# Patient Record
Sex: MALE | Race: WHITE | NOT HISPANIC OR LATINO | Employment: OTHER | URBAN - METROPOLITAN AREA
[De-identification: names, ages, dates, MRNs, and addresses within clinical notes are randomized per-mention and may not be internally consistent; named-entity substitution may affect disease eponyms.]

---

## 2019-01-23 ENCOUNTER — LAB VISIT (OUTPATIENT)
Dept: LAB | Facility: HOSPITAL | Age: 69
End: 2019-01-23
Attending: INTERNAL MEDICINE
Payer: MEDICARE

## 2019-01-23 DIAGNOSIS — I63.9 IMPENDING CEREBROVASCULAR ACCIDENT: Primary | ICD-10-CM

## 2019-01-23 LAB
INR PPP: 1.7
PROTHROMBIN TIME: 17.5 SEC

## 2019-01-23 PROCEDURE — 36415 COLL VENOUS BLD VENIPUNCTURE: CPT

## 2019-01-23 PROCEDURE — 85610 PROTHROMBIN TIME: CPT

## 2019-01-28 ENCOUNTER — TELEPHONE (OUTPATIENT)
Dept: FAMILY MEDICINE | Facility: CLINIC | Age: 69
End: 2019-01-28

## 2019-01-28 DIAGNOSIS — Z71.89 ENCOUNTER FOR HOME SAFETY REVIEW FOR INJURY PREVENTION: ICD-10-CM

## 2019-01-28 DIAGNOSIS — F40.9 FEAR FOR PERSONAL SAFETY: Primary | ICD-10-CM

## 2019-01-28 NOTE — TELEPHONE ENCOUNTER
Please contact home health to clarify, patient has never been seen here before    Please notice that there is no primary care provider listed

## 2019-01-28 NOTE — TELEPHONE ENCOUNTER
"----- Message from Sraah Fisher sent at 1/25/2019  4:08 PM CST -----  Contact: Astrid Terrazas with [facility]  - [phone]  Rep asking for orders for a 3 in 1 bedside Commode for transfer safety. Pt's Height is 71" and weight 197 lbs . Please fax to [employer].  " PHYSICAL EXAM:    GENERAL: Patient is awake and alert and in no acute distress.  Non-toxic appearing.  A+Ox4  Cachetic.  HEAD:  Airway patent.  No oropharyngeal edema.  No stridor.  Auricles are normal.    EYES: EOM grossly intact, conjunctiva non-injected and sclera clear  NECK: Supple, No vertebral point tenderness to palpation.  CHEST/LUNG: Lungs clear to auscultation bilaterally; no wheeze, no rhonchi,  no rales.    HEART: Regular rate and rhythm;   ABDOMEN: Soft, non-tender to palpation, +distended.  No rebound/no guarding.  Bowel sounds present x 4.   MSK/EXTREMITIES: No clubbing or cyanosis. Back is nontender, with no vertebral point tenderness to palpation, no CVAT.  Moving all 4 extremities.     NEURO: Neurologically grossly intact.   No obvious deficits.   PSYCH: Psychiatrically flat affect.  No apparent risk to self or others.

## 2019-01-29 ENCOUNTER — LAB VISIT (OUTPATIENT)
Dept: LAB | Facility: HOSPITAL | Age: 69
End: 2019-01-29
Attending: INTERNAL MEDICINE
Payer: MEDICARE

## 2019-01-29 DIAGNOSIS — I63.9 IMPENDING CEREBROVASCULAR ACCIDENT: Primary | ICD-10-CM

## 2019-01-29 LAB
INR PPP: 1.3
PROTHROMBIN TIME: 13.3 SEC

## 2019-01-29 PROCEDURE — 36415 COLL VENOUS BLD VENIPUNCTURE: CPT

## 2019-01-29 PROCEDURE — 85610 PROTHROMBIN TIME: CPT

## 2019-01-31 ENCOUNTER — LAB VISIT (OUTPATIENT)
Dept: LAB | Facility: HOSPITAL | Age: 69
End: 2019-01-31
Attending: OBSTETRICS & GYNECOLOGY
Payer: MEDICARE

## 2019-01-31 DIAGNOSIS — I63.9 IMPENDING CEREBROVASCULAR ACCIDENT: Primary | ICD-10-CM

## 2019-01-31 LAB
INR PPP: 1.8
PROTHROMBIN TIME: 18.4 SEC

## 2019-01-31 PROCEDURE — 36415 COLL VENOUS BLD VENIPUNCTURE: CPT

## 2019-01-31 PROCEDURE — 85610 PROTHROMBIN TIME: CPT

## 2019-01-31 NOTE — PROGRESS NOTES
This note was created by combination of typed  and Dragon dictation.  Transcription errors may be present.  If there are any questions, please contact me.    Assessment & Plan:   Cerebrovascular accident (CVA) due to embolism of left middle cerebral artery; left basal ganglia; hospitalization [facility] 1/2019  Chronic atrial fibrillation  Pure hypercholesterolemia  Chronic systolic heart failure  Chronic anticoagulation  -status post [facility] early [facility] at [facility].  Outside records reviewed.  I am changing his warfarin to Eliquis.  On metoprolol for rate control.  Was started on pantoprazole presumably for possible stomach lining protective affect on anticoagulation, no history of GERD.  He missed his appointment with the [facility].  He is agreeable to see [facility] for Cardiology and I have submitted a referral.  He has an upcoming follow-up with Neurology and Neurosurgery with the [facility] and he can keep those appointments.  -     apixaban (ELIQUIS) 5 mg Tab; Take 1 tablet (5 mg total) by mouth 2 (two) times daily.  Dispense: 30 tablet; Refill: 5  -     metoprolol tartrate (LOPRESSOR) 25 MG tablet; Take 1 tablet (25 mg total) by mouth 2 (two) times daily. Take a 1/2 tab by mouth twice daily.  Dispense: 90 tablet; Refill: 3  -     pantoprazole (PROTONIX) 40 MG tablet; Take 1 tablet (40 mg total) by mouth once daily. 1 tab by mouth every morning before breakfast.  Dispense: 90 tablet; Refill: 3  -     Ambulatory referral to Cardiology    Type 2 diabetes mellitus without complication, without long-term current use of insulin  -new diagnosis.  Foot exam done today.  He was told that he should wait until his residual deficits from his stroke settle down before seeing an eye doctor as he has decreased vision in his right eye and I think that that would be reasonable.  Dietary modification discussed.  Mediterranean diet handout provided.  For now focus on diet modification  -      atorvastatin (LIPITOR) 80 MG tablet; Take 1 tablet (80 mg total) by mouth once daily. Take 1 tab by mouth every night  Dispense: 90 tablet; Refill: 3    He declines flu and pneumovax today.    Medications Discontinued During This Encounter   Medication Reason    warfarin (COUMADIN) 7.5 MG tablet Therapy completed    atorvastatin (LIPITOR) 80 MG tablet Reorder    metoprolol tartrate (LOPRESSOR) 25 MG tablet Reorder    pantoprazole (PROTONIX) 40 MG tablet Reorder       meds sent this encounter:  Medications Ordered This Encounter   Medications    apixaban (ELIQUIS) 5 mg Tab     Sig: Take 1 tablet (5 mg total) by mouth 2 (two) times daily.     Dispense:  30 tablet     Refill:  5    atorvastatin (LIPITOR) 80 MG tablet     Sig: Take 1 tablet (80 mg total) by mouth once daily. Take 1 tab by mouth every night     Dispense:  90 tablet     Refill:  3    metoprolol tartrate (LOPRESSOR) 25 MG tablet     Sig: Take 1 tablet (25 mg total) by mouth 2 (two) times daily. Take a 1/2 tab by mouth twice daily.     Dispense:  90 tablet     Refill:  3    pantoprazole (PROTONIX) 40 MG tablet     Sig: Take 1 tablet (40 mg total) by mouth once daily. 1 tab by mouth every morning before breakfast.     Dispense:  90 tablet     Refill:  3       Follow Up: No Follow-up on file. OV3  Months recall set. At that time discuss colonoscopy, eye exam, CT lung CA screening, repeat labs.    Subjective:     Chief Complaint   Patient presents with    Establish Care    Follow-up     Hospital    Cerebrovascular Accident    Diabetes       KAMLA Galvin is a 68 y.o. male, last appointment with this clinic was Visit date not found.    NP  Presents today accompanied by his wife.  Because of dysarthria most of the history is obtained from the wife.  Had previously not been to see a doctor in decades.  Was hospitalized from January 1st to January 8th for embolic stroke with new diagnosis of atrial fibrillation and systolic heart failure on  echocardiogram.  Was not on medications at the time.  Smoker.  Had been using nicotine patch.  He had tPA done on presentation.  Subsequently had hemorrhagic conversion of infarct of the left basal ganglia.  Echo showed atrial fibrillation felt to be chronic.  Systolic dysfunction.  Right-sided deficits.  Was started on beta-blocker, started on anticoagulation, started on statin.  Was discharged.  Is getting [facility] but has not gotten speech therapy yet.  A1c was high, diagnostic range for diabetes.  He was not aware of this diagnosis and we discussed this today.    He has an upcoming follow-up visit with Neurosurgery as well as with Neurology but that is not until late February and early March.  He and his wife are unaware that he had an appointment to see Cardiology and that was scheduled for January 29th with Dr. Gentile.  So they missed it.    He is getting home PT/INR but they would like to change him over to a novel anticoagulant because of the trouble and anticipated trouble and expense of Coumadin monitoring.    Date of admission: 1/1/2019  Date of discharge: 01/08/2019.    Admission diagnoses:  Acute cerebral infarction.  Essential hypertension.  Atrial fibrillation.  Hyperglycemia.    Discharge Diagnoses:  Acute cerebral infarction with hemorrhagic conversion.  Atrial fibrillation, chronic.  Essential hypertension.  Diabetes mellitus type 2.  Congestive heart failure, systolic, chronic.  Elevated troponin.  Leukocytosis.  Diabetes mellitus type 2: Diagnosis based on hemoglobin A1c of 7.1% from 01/02/2019. No previous diagnosis--but patient has not been evaluated by a physician in many years. Seems likely this has been present for quite some time. Blood glucose adequately controlled at present. Continue diabetic diet. Treat with correction dose insulin aspart as needed. Monitor blood glucose and consider long-term treatment as indicated.    Congestive heart failure, systolic, chronic: Diagnosis  based on echocardiogram from 01/02/2019 revealing estimated ejection fraction of 40-45%. Unclear etiology--may simply be related to hypertensive cardiomyopathy. May need to consider ischemic workup as an outpatient once further recovered from this acute illness. No Ace inhibitor/angiotensin receptor blocker for now given need to maintain blood pressure for cerebral perfusion in the setting of acute cerebral infarction as below. Continue aspirin, atorvastatin, metoprolol tartrate.    Discharge medications:  Aspirin 325 mg by mouth daily.  Atorvastatin 80 mg by mouth daily.  Docusate 100 mg by mouth twice daily.  Heparin subcutaneous 5000 units subcutaneous twice daily.  Metoprolol tartrate 12.5 mg by mouth twice daily.  Nicotine patch 21 mg per 24 hr daily.    Hospital Course:  68-year-old male admitted to the neurosurgery service for acute cerebral infarction found to be secondary to left middle cerebral artery thrombus presumed to be cardioembolic in origin related to atrial fibrillation. Patient treated with tissue plasminogen activator upon presentation. After patient found to have occlusion of the left middle cerebral artery, patient underwent cerebral angiography with mechanical thrombolysis by Neurosurgery (Dr. Kevin Nobles). Patient found to have left basal ganglia hemorrhage on follow-up CT of the head suggesting hemorrhagic conversion of ischemic infarct. Neurosurgery following--agrees no intervention necessary and recommends monitoring for now. Patient currently recovering well. Patient with residual right hemiparesis, dysarthria, expressive aphasia. Therapists have evaluated patient and recommended inpatient rehabilitation unit for further aggressive therapy. Initially held antiplatelet and anticoagulation for at least 5 days (through 1/06/2019) per Neurosurgery recommendations. Repeat imaging on 01/06/2019 revealed stable findings. Neurosurgery has cleared for condition issue of  antiplatelets/anticoagulation. Started aspirin for now and will consider starting warfarin for anticoagulation once further discussed risks versus benefits with patient's wife. Will avoid novel oral anticoagulants for now as there is no adequate antidote if patient was to have worsening bleeding. Can consider novel oral anticoagulants for use of administration in the next several weeks. Continue atorvastatin.       Fasting lipid profile: Total cholesterol 171 mg/dL. HDL 36 mg/dL.  mg/dL. Triglycerides 118 mg/dL.     Hemoglobin A1c: 7.1%.       MRI of the brain without contrast (01/02/2019):  Recent hemorrhagic infarct centered in the left basal ganglia, involving the left putamen, left globus pallidus, left internal capsule, left caudate head, in the medial aspect of the left temporal lobe.  Surrounding edema and associated mass effect with partial effacement of the left lateral ventricle.  Multiple small foci of abnormal T2 and FLAIR signal in the periventricular and subcortical white matter bilaterally.  Mild generalized cerebral atrophy.   Pituitary gland is not enlarged.   Cerebellar tonsils are in their normal location.   Ventricles are not enlarged.   Visualized orbits are intact.   Mild-moderate inflammatory changes in the paranasal sinuses, most prominent in the ethmoid air cells.  Flow voids in the distal internal carotid arteries and in the basilar artery.  IMPRESSION:   Recent hemorrhagic infarct centered in the left basal ganglia, involving the left putamen, left globus pallidus, left internal capsule, left caudate head, and the medial aspect of the left temporal lobe. Associated mass affect with partial effacement of the left lateral ventricle. Findings are similar to the most recent CT head. Consider follow-up CT head to evaluate for interval change.  Evidence of patchy bilateral chronic white matter ischemia.  Mild generalized cerebral atrophy.    CT of the head without contrast  (01/02/2019):  Evolving recent ischemia involving the left basal ganglia and left caudate head now with intervally developed patchy hemorrhagic conversion in the left basal ganglia. Minor localized mass effect resulting in slight compression of the left frontal horn. The right lateral ventricle appears slightly more prominent than prior exam although without overt hydrocephalus. There may be minor, 1-2 mm left to right midline shift which is new since prior.  Background cerebral atrophy and chronic patchy white matter hypoattenuation most likely reflecting chronic white matter small vessel ischemic changes are similar to prior. No evidence for new cortical-based ischemia or large extra-axial collection.  No acute bony abnormality.   Changes of chronic sinusitis as before.  IMPRESSION:   Impression:  1.   Evolving recent left basal ganglia/left caudate head ischemia now with new patchy hemorrhagic conversion in the left basal ganglia. There is some mild localized mass effect with slight new effacement of the left frontal horn of the lateral ventricle and mild increased prominence of the right lateral ventricle although without overt hydrocephalus at this time. New 1-2 mm left to right midline shift.   2.   Background cerebral atrophy and chronic microangiopathy.       Cerebral perfusion study with contrast (01/01/2018):  There is decreased cerebral blood volume and cerebral blood flow with increased mean transit time in the territory of the left middle cerebral artery in the left temporal, frontal, and parietal lobes.  The core of the infarct is predominantly in the frontal lobe with additional involvement at the putamen, insular cortex, and parietal lobe representing 30-40% of the infarct territory. The majority of the infarct territory is consistent with penumbra and involves mostly the temporal lobe.  IMPRESSION:   Left MCA infarct with 30-40% of the infarct territory representing core. This predominantly involves  the frontal lobe.        CT angiogram of the head (01/01/2019):  There is occlusion of the left middle cerebral artery at the bifurcation of the left internal carotid artery. There is opacification of the left A1 segment and anterior cerebral artery. The right middle cerebral and anterior cerebral arteries are patent. The basilar artery bifurcates normally. The posterior cerebral arteries are patent bilaterally.  There is the typical configuration at the aortic arch with the left common carotid artery having a separate origin from the arch. The vertebral arteries arise from their respective subclavian arteries. The right vertebral artery is dominant. The left vertebral artery is very small in caliber. There is severe (75-80%) stenosis in the proximal right internal carotid artery. No significant stenosis is seen in the left internal carotid artery.  No intracranial mass or mass effect is seen. No abnormal enhancement is noted in the brain parenchyma.  No mass or adenopathy is seen in the neck. Mucosal surfaces of the pharynx are symmetric. The airway is patent. No focal consolidation or effusion is seen in the upper lungs.  IMPRESSION:   1. 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  2. Severe stenosis in the proximal right internal carotid artery.    Echocardiogram, transthoracic (01/02/2018):  Left Ventricle:    Normal left ventricular cavity size. Normal left ventricular wall thickness. Mildly decreased left ventricular     systolic function. Left ventricular ejection fraction is estimated at 40-45 %. Rhythm precludes evaluation of     diastolic function.   Right Ventricle:    Mildly increased right ventricular size. Normal right ventricular systolic function. Right ventricular systolic     pressure 31.5 mmHg.     RV basal-4.76cm    s wave-10    TAPSE-18   Right Atrium:    Mildly increased right atrial size.   Left Atrium:    Mildly increased left atrial size. Left atrial volume index 41.4 ml/m².    Mitral Valve:    Structurally normal mitral valve. Mitral annular calcification. Trace mitral valve regurgitation.   Aortic Valve:     Trileaflet aortic valve. No aortic valve regurgitation.   Tricuspid Valve:     Structurally normal tricuspid valve. Mild tricuspid valve regurgitation.   Pulmonic Valve:     Structurally normal pulmonic valve. Trace pulmonary valve regurgitation.   Pericardium:    No pericardial effusion.   Aorta:    Normal size aortic root and proximal ascending aorta.   Additional Findings:    A bubble study was done and appeared to be negative at rest and with valsalva.   CONCLUSIONS     Normal left ventricular cavity size.     Normal left ventricular wall thickness.     Mildly decreased left ventricular systolic function.     Left ventricular ejection fraction is estimated at 40-45 %.     Rhythm precludes evaluation of diastolic function.     Normal right ventricular systolic function.     Right ventricular systolic pressure 31.5 mmHg.     Mildly increased right atrial size.     Mildly increased left atrial size.     Trace mitral valve regurgitation.     Trileaflet aortic valve.     Mild tricuspid valve regurgitation.     Trace pulmonary valve regurgitation.     A bubble study was done and appeared to be negative at rest and with valsalva.     Patient Care Team:  Bethel Harley MD as PCP - General (Internal Medicine)    Patient Active Problem List    Diagnosis Date Noted    Type 2 diabetes mellitus without complication, without long-term current use of insulin 02/01/2019    Chronic atrial fibrillation 02/01/2019    Pure hypercholesterolemia 02/01/2019    Chronic systolic heart failure new dx with CVA 1/2019 on TTE LVEF 40-45 02/01/2019     Echocardiogram, transthoracic (01/02/2018):  Left Ventricle: Normal left ventricular cavity size. Normal left ventricular wall thickness. Mildly decreased left ventricular systolic function. Left ventricular ejection fraction is estimated at 40-45 %.  Rhythm precludes evaluation of diastolic function.   Right Ventricle:  Mildly increased right ventricular size. Normal right ventricular systolic function. Right ventricular systolic pressure 31.5 mmHg. RV basal-4.76cm    s wave-10    TAPSE-18   Right Atrium: Mildly increased right atrial size.   Left Atrium: Mildly increased left atrial size. Left atrial volume index 41.4 ml/m².   Mitral Valve: Structurally normal mitral valve. Mitral annular calcification. Trace mitral valve regurgitation.   Aortic Valve: Trileaflet aortic valve. No aortic valve regurgitation.   Tricuspid Valve: Structurally normal tricuspid valve. Mild tricuspid valve regurgitation.   Pulmonic Valve: Structurally normal pulmonic valve. Trace pulmonary valve regurgitation.   Pericardium: No pericardial effusion.   Aorta: Normal size aortic root and proximal ascending aorta.   Additional Findings: A bubble study was done and appeared to be negative at rest and with valsalva.   CONCLUSIONS     Normal left ventricular cavity size.     Normal left ventricular wall thickness.     Mildly decreased left ventricular systolic function.     Left ventricular ejection fraction is estimated at 40-45 %.     Rhythm precludes evaluation of diastolic function.     Normal right ventricular systolic function.     Right ventricular systolic pressure 31.5 mmHg.     Mildly increased right atrial size.     Mildly increased left atrial size.     Trace mitral valve regurgitation.     Trileaflet aortic valve.     Mild tricuspid valve regurgitation.     Trace pulmonary valve regurgitation.     A bubble study was done and appeared to be negative at rest and with valsalva.       Cerebrovascular accident (CVA) due to embolism of left middle cerebral artery; left basal ganglia; hospitalization [facility] 1/2019 01/02/2019     Cerebral perfusion study with contrast (01/01/2019):  IMPRESSION:   Left MCA infarct with 30-40% of the infarct territory representing core. This predominantly  involves the frontal lobe.      CT angiogram of the head (01/01/2019):  IMPRESSION:   1. 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  2. Severe stenosis in the proximal right internal carotid artery.    MRI of the brain without contrast (01/02/2019):   IMPRESSION:   Recent hemorrhagic infarct centered in the left basal ganglia, involving the left putamen, left globus pallidus, left internal capsule, left caudate head, and the medial aspect of the left temporal lobe. Associated mass affect with partial effacement of the left lateral ventricle. Findings are similar to the most recent CT head. Consider follow-up CT head to evaluate for interval change.  Evidence of patchy bilateral chronic white matter ischemia.  Mild generalized cerebral atrophy.    CT of the head without contrast (01/02/2019):  IMPRESSION:   Impression:  1.   Evolving recent left basal ganglia/left caudate head ischemia now with new patchy hemorrhagic conversion in the left basal ganglia. There is some mild localized mass effect with slight new effacement of the left frontal horn of the lateral ventricle and mild increased prominence of the right lateral ventricle although without overt hydrocephalus at this time. New 1-2 mm left to right midline shift.   2.   Background cerebral atrophy and chronic microangiopathy.         PAST MEDICAL HISTORY:  No past medical history on file.    PAST SURGICAL HISTORY:  No past surgical history on file.    Family History   Problem Relation Age of Onset    Arthritis Mother     Arthritis Father     Heart disease Father     No Known Problems Sister     COPD Brother     No Known Problems Sister     Peripheral vascular disease Sister     Alcohol abuse Brother        SOCIAL HISTORY:  Social History     Socioeconomic History    Marital status:      Spouse name: Cely Doshi    Number of children: Not on file    Years of education: Not on file    Highest education level: Not on file  "  Social Needs    Financial resource strain: Not on file    Food insecurity - worry: Not on file    Food insecurity - inability: Not on file    Transportation needs - medical: Not on file    Transportation needs - non-medical: Not on file   Occupational History    Occupation: retired - ship builder -[employer]   Tobacco Use    Smoking status: Former Smoker     Packs/day: 1.00     Years: 50.00     Pack years: 50.00     Types: Cigarettes     Start date:      Last attempt to quit: 2019     Years since quittin.0    Smokeless tobacco: Never Used   Substance and Sexual Activity    Alcohol use: No     Frequency: Never    Drug use: No    Sexual activity: No   Other Topics Concern    Not on file   Social History Narrative    Not on file       ALLERGIES AND MEDICATIONS: updated and reviewed.  Review of patient's allergies indicates:  Allergies not on file  No current outpatient medications on file.     No current facility-administered medications for this visit.        Review of Systems   Constitutional: Negative for chills and fever.   Respiratory: Negative for shortness of breath.    Cardiovascular: Negative for chest pain.   Neurological: Positive for focal weakness. Negative for headaches.       Objective:   Physical Exam   Vitals:    19 1003   BP: 120/70   Pulse: 86   Temp: 98.3 °F (36.8 °C)   TempSrc: Oral   SpO2: 97%   Weight: 93.9 kg (207 lb 0.2 oz)   Height: 5' 9" (1.753 m)    Body mass index is 30.57 kg/m².            Physical Exam   Constitutional: He is oriented to person, place, and time. He appears well-developed and well-nourished. No distress.   Eyes: EOM are normal.   Cardiovascular: Normal rate and normal heart sounds.   No murmur heard.  Pulses:       Dorsalis pedis pulses are 3+ on the right side, and 3+ on the left side.        Posterior tibial pulses are 3+ on the right side, and 3+ on the left side.   Irregularly irregular   Pulmonary/Chest: Effort normal and breath " sounds normal.   Musculoskeletal: He exhibits edema.        Right foot: There is no deformity.        Left foot: There is no deformity.   Mild pitting edema of the ankles bilaterally   Feet:   Right Foot:   Protective Sensation: 5 sites tested. 5 sites sensed.   Skin Integrity: Negative for ulcer, blister, skin breakdown, erythema or warmth.   Left Foot:   Protective Sensation: 5 sites tested. 5 sites sensed.   Skin Integrity: Negative for ulcer, blister, skin breakdown, erythema or warmth.   Neurological: He is alert and oriented to person, place, and time. He displays abnormal reflex. A cranial nerve deficit is present. He exhibits abnormal muscle tone.   Broad-based gait which he and wife note is new since the stroke.  Pronator drift on the right.   strength 4/5 on the right.  Hyperreflexia of the right patella and Achilles DTR  Right-sided facial droop   Skin: Skin is warm and dry.   Psychiatric: He has a normal mood and affect. His behavior is normal. Thought content normal.

## 2019-02-01 ENCOUNTER — OFFICE VISIT (OUTPATIENT)
Dept: FAMILY MEDICINE | Facility: CLINIC | Age: 69
End: 2019-02-01
Payer: MEDICARE

## 2019-02-01 VITALS
HEART RATE: 86 BPM | SYSTOLIC BLOOD PRESSURE: 120 MMHG | WEIGHT: 207 LBS | DIASTOLIC BLOOD PRESSURE: 70 MMHG | OXYGEN SATURATION: 97 % | TEMPERATURE: 98 F | BODY MASS INDEX: 30.66 KG/M2 | HEIGHT: 69 IN

## 2019-02-01 DIAGNOSIS — I48.20 CHRONIC ATRIAL FIBRILLATION: ICD-10-CM

## 2019-02-01 DIAGNOSIS — I50.22 CHRONIC SYSTOLIC HEART FAILURE: ICD-10-CM

## 2019-02-01 DIAGNOSIS — E11.9 TYPE 2 DIABETES MELLITUS WITHOUT COMPLICATION, WITHOUT LONG-TERM CURRENT USE OF INSULIN: ICD-10-CM

## 2019-02-01 DIAGNOSIS — I63.412 CEREBROVASCULAR ACCIDENT (CVA) DUE TO EMBOLISM OF LEFT MIDDLE CEREBRAL ARTERY: Primary | ICD-10-CM

## 2019-02-01 DIAGNOSIS — Z79.01 CHRONIC ANTICOAGULATION: ICD-10-CM

## 2019-02-01 DIAGNOSIS — E78.00 PURE HYPERCHOLESTEROLEMIA: ICD-10-CM

## 2019-02-01 PROBLEM — I63.9 STROKE: Status: ACTIVE | Noted: 2019-01-02

## 2019-02-01 PROCEDURE — 99204 OFFICE O/P NEW MOD 45 MIN: CPT | Mod: S$GLB,,, | Performed by: INTERNAL MEDICINE

## 2019-02-01 PROCEDURE — 99204 PR OFFICE/OUTPT VISIT, NEW, LEVL IV, 45-59 MIN: ICD-10-PCS | Mod: S$GLB,,, | Performed by: INTERNAL MEDICINE

## 2019-02-01 PROCEDURE — 1101F PR PT FALLS ASSESS DOC 0-1 FALLS W/OUT INJ PAST YR: ICD-10-PCS | Mod: CPTII,S$GLB,, | Performed by: INTERNAL MEDICINE

## 2019-02-01 PROCEDURE — 99999 PR PBB SHADOW E&M-EST. PATIENT-LVL III: ICD-10-PCS | Mod: PBBFAC,,, | Performed by: INTERNAL MEDICINE

## 2019-02-01 PROCEDURE — 1101F PT FALLS ASSESS-DOCD LE1/YR: CPT | Mod: CPTII,S$GLB,, | Performed by: INTERNAL MEDICINE

## 2019-02-01 PROCEDURE — 99499 UNLISTED E&M SERVICE: CPT | Mod: S$GLB,,, | Performed by: INTERNAL MEDICINE

## 2019-02-01 PROCEDURE — 99499 RISK ADDL DX/OHS AUDIT: ICD-10-PCS | Mod: S$GLB,,, | Performed by: INTERNAL MEDICINE

## 2019-02-01 PROCEDURE — 99999 PR PBB SHADOW E&M-EST. PATIENT-LVL III: CPT | Mod: PBBFAC,,, | Performed by: INTERNAL MEDICINE

## 2019-02-01 RX ORDER — ATORVASTATIN CALCIUM 80 MG/1
80 TABLET, FILM COATED ORAL DAILY
Qty: 90 TABLET | Refills: 3 | Status: SHIPPED | OUTPATIENT
Start: 2019-02-01 | End: 2021-04-14 | Stop reason: SDUPTHER

## 2019-02-01 RX ORDER — DOCUSATE SODIUM 100 MG/1
100 CAPSULE, LIQUID FILLED ORAL 2 TIMES DAILY
COMMUNITY
End: 2021-04-14

## 2019-02-01 RX ORDER — PANTOPRAZOLE SODIUM 40 MG/1
40 TABLET, DELAYED RELEASE ORAL DAILY
COMMUNITY
Start: 2019-01-22 | End: 2019-02-01 | Stop reason: SDUPTHER

## 2019-02-01 RX ORDER — IBUPROFEN 200 MG
1 TABLET ORAL
COMMUNITY
End: 2019-03-29 | Stop reason: ALTCHOICE

## 2019-02-01 RX ORDER — METOPROLOL TARTRATE 25 MG/1
25 TABLET, FILM COATED ORAL 2 TIMES DAILY
Qty: 90 TABLET | Refills: 3 | Status: SHIPPED | OUTPATIENT
Start: 2019-02-01 | End: 2021-04-14

## 2019-02-01 RX ORDER — WARFARIN 7.5 MG/1
7.5 TABLET ORAL DAILY
COMMUNITY
End: 2019-02-01 | Stop reason: ALTCHOICE

## 2019-02-01 RX ORDER — METOPROLOL TARTRATE 25 MG/1
25 TABLET, FILM COATED ORAL 2 TIMES DAILY
COMMUNITY
End: 2019-02-01 | Stop reason: SDUPTHER

## 2019-02-01 RX ORDER — ATORVASTATIN CALCIUM 80 MG/1
80 TABLET, FILM COATED ORAL DAILY
COMMUNITY
End: 2019-02-01 | Stop reason: SDUPTHER

## 2019-02-01 RX ORDER — PANTOPRAZOLE SODIUM 40 MG/1
40 TABLET, DELAYED RELEASE ORAL DAILY
Qty: 90 TABLET | Refills: 3 | Status: SHIPPED | OUTPATIENT
Start: 2019-02-01 | End: 2020-05-12

## 2019-02-08 DIAGNOSIS — Z11.59 NEED FOR HEPATITIS C SCREENING TEST: ICD-10-CM

## 2019-02-08 DIAGNOSIS — Z12.11 COLON CANCER SCREENING: ICD-10-CM

## 2019-02-19 ENCOUNTER — OFFICE VISIT (OUTPATIENT)
Dept: CARDIOLOGY | Facility: CLINIC | Age: 69
End: 2019-02-19
Payer: MEDICARE

## 2019-02-19 VITALS
OXYGEN SATURATION: 98 % | BODY MASS INDEX: 30.04 KG/M2 | SYSTOLIC BLOOD PRESSURE: 148 MMHG | DIASTOLIC BLOOD PRESSURE: 90 MMHG | HEIGHT: 69 IN | WEIGHT: 202.81 LBS | HEART RATE: 82 BPM

## 2019-02-19 DIAGNOSIS — I48.20 CHRONIC ATRIAL FIBRILLATION: ICD-10-CM

## 2019-02-19 DIAGNOSIS — I10 HTN (HYPERTENSION): ICD-10-CM

## 2019-02-19 DIAGNOSIS — I63.412 CEREBROVASCULAR ACCIDENT (CVA) DUE TO EMBOLISM OF LEFT MIDDLE CEREBRAL ARTERY: Primary | ICD-10-CM

## 2019-02-19 DIAGNOSIS — I50.22 CHRONIC SYSTOLIC HEART FAILURE: ICD-10-CM

## 2019-02-19 DIAGNOSIS — E78.00 PURE HYPERCHOLESTEROLEMIA: ICD-10-CM

## 2019-02-19 DIAGNOSIS — E11.9 TYPE 2 DIABETES MELLITUS WITHOUT COMPLICATION, WITHOUT LONG-TERM CURRENT USE OF INSULIN: ICD-10-CM

## 2019-02-19 PROCEDURE — 99999 PR PBB SHADOW E&M-EST. PATIENT-LVL III: CPT | Mod: PBBFAC,,, | Performed by: INTERNAL MEDICINE

## 2019-02-19 PROCEDURE — 99499 RISK ADDL DX/OHS AUDIT: ICD-10-PCS | Mod: S$GLB,,, | Performed by: INTERNAL MEDICINE

## 2019-02-19 PROCEDURE — 99204 PR OFFICE/OUTPT VISIT, NEW, LEVL IV, 45-59 MIN: ICD-10-PCS | Mod: S$GLB,,, | Performed by: INTERNAL MEDICINE

## 2019-02-19 PROCEDURE — 99499 UNLISTED E&M SERVICE: CPT | Mod: S$GLB,,, | Performed by: INTERNAL MEDICINE

## 2019-02-19 PROCEDURE — 3077F PR MOST RECENT SYSTOLIC BLOOD PRESSURE >= 140 MM HG: ICD-10-PCS | Mod: CPTII,S$GLB,, | Performed by: INTERNAL MEDICINE

## 2019-02-19 PROCEDURE — 93000 EKG 12-LEAD: ICD-10-PCS | Mod: S$GLB,,, | Performed by: INTERNAL MEDICINE

## 2019-02-19 PROCEDURE — 3080F PR MOST RECENT DIASTOLIC BLOOD PRESSURE >= 90 MM HG: ICD-10-PCS | Mod: CPTII,S$GLB,, | Performed by: INTERNAL MEDICINE

## 2019-02-19 PROCEDURE — 99204 OFFICE O/P NEW MOD 45 MIN: CPT | Mod: S$GLB,,, | Performed by: INTERNAL MEDICINE

## 2019-02-19 PROCEDURE — 99999 PR PBB SHADOW E&M-EST. PATIENT-LVL III: ICD-10-PCS | Mod: PBBFAC,,, | Performed by: INTERNAL MEDICINE

## 2019-02-19 PROCEDURE — 3080F DIAST BP >= 90 MM HG: CPT | Mod: CPTII,S$GLB,, | Performed by: INTERNAL MEDICINE

## 2019-02-19 PROCEDURE — 1101F PT FALLS ASSESS-DOCD LE1/YR: CPT | Mod: CPTII,S$GLB,, | Performed by: INTERNAL MEDICINE

## 2019-02-19 PROCEDURE — 93000 ELECTROCARDIOGRAM COMPLETE: CPT | Mod: S$GLB,,, | Performed by: INTERNAL MEDICINE

## 2019-02-19 PROCEDURE — 3077F SYST BP >= 140 MM HG: CPT | Mod: CPTII,S$GLB,, | Performed by: INTERNAL MEDICINE

## 2019-02-19 PROCEDURE — 1101F PR PT FALLS ASSESS DOC 0-1 FALLS W/OUT INJ PAST YR: ICD-10-PCS | Mod: CPTII,S$GLB,, | Performed by: INTERNAL MEDICINE

## 2019-02-19 NOTE — PROGRESS NOTES
Subjective:    Patient ID:  Kamar Doshi is a 68 y.o. male who presents for follow-up of Atrial Fibrillation      HPI   Chronic A-fib - on eliquis, CVA, CHF - EF 40- 45% (Echo 1/2/19) , HTN, HLD, DM    Referred by Dr Harley  Presents today accompanied by his wife.  Because of dysarthria most of the history is obtained from the wife.  Had previously not been to see a doctor in decades.  Was hospitalized from January 1st to January 8th for embolic stroke with new diagnosis of atrial fibrillation and systolic heart failure on echocardiogram.  Was not on medications at the time.  Smoker.  Had been using nicotine patch.  He had tPA done on presentation.  Subsequently had hemorrhagic conversion of infarct of the left basal ganglia.  Echo showed atrial fibrillation felt to be chronic.  Systolic dysfunction.  Right-sided deficits.  Was started on beta-blocker, started on anticoagulation, started on statin.  Was discharged.  Is getting [facility] but has not gotten speech therapy yet.  A1c was high, diagnostic range for diabetes.  He was not aware of this diagnosis and we discussed this today.     He has an upcoming follow-up visit with Neurosurgery as well as with Neurology but that is not until late February and early March.  He and his wife are unaware that he had an appointment to see Cardiology and that was scheduled for January 29th with Dr. Gentile.  So they missed it.     He is getting home PT/INR but they would like to change him over to a novel anticoagulant because of the trouble and anticipated trouble and expense of Coumadin monitoring.     Date of admission: 1/1/2019  Date of discharge: 01/08/2019.    Admission diagnoses:  Acute cerebral infarction.  Essential hypertension.  Atrial fibrillation.  Hyperglycemia.    Discharge Diagnoses:  Acute cerebral infarction with hemorrhagic conversion.  Atrial fibrillation, chronic.  Essential hypertension.  Diabetes mellitus type 2.  Congestive heart failure,  systolic, chronic.  Elevated troponin.  Leukocytosis.  Diabetes mellitus type 2: Diagnosis based on hemoglobin A1c of 7.1% from 01/02/2019. No previous diagnosis--but patient has not been evaluated by a physician in many years. Seems likely this has been present for quite some time. Blood glucose adequately controlled at present. Continue diabetic diet. Treat with correction dose insulin aspart as needed. Monitor blood glucose and consider long-term treatment as indicated.    Congestive heart failure, systolic, chronic: Diagnosis based on echocardiogram from 01/02/2019 revealing estimated ejection fraction of 40-45%. Unclear etiology--may simply be related to hypertensive cardiomyopathy. May need to consider ischemic workup as an outpatient once further recovered from this acute illness. No Ace inhibitor/angiotensin receptor blocker for now given need to maintain blood pressure for cerebral perfusion in the setting of acute cerebral infarction as below. Continue aspirin, atorvastatin, metoprolol tartrate.    Discharge medications:  Aspirin 325 mg by mouth daily.  Atorvastatin 80 mg by mouth daily.  Docusate 100 mg by mouth twice daily.  Heparin subcutaneous 5000 units subcutaneous twice daily.  Metoprolol tartrate 12.5 mg by mouth twice daily.  Nicotine patch 21 mg per 24 hr daily.    Hospital Course:  68-year-old male admitted to the neurosurgery service for acute cerebral infarction found to be secondary to left middle cerebral artery thrombus presumed to be cardioembolic in origin related to atrial fibrillation. Patient treated with tissue plasminogen activator upon presentation. After patient found to have occlusion of the left middle cerebral artery, patient underwent cerebral angiography with mechanical thrombolysis by Neurosurgery (Dr. Kevin Nobles). Patient found to have left basal ganglia hemorrhage on follow-up CT of the head suggesting hemorrhagic conversion of ischemic infarct. Neurosurgery  following--agrees no intervention necessary and recommends monitoring for now. Patient currently recovering well. Patient with residual right hemiparesis, dysarthria, expressive aphasia. Therapists have evaluated patient and recommended inpatient rehabilitation unit for further aggressive therapy. Initially held antiplatelet and anticoagulation for at least 5 days (through 1/06/2019) per Neurosurgery recommendations. Repeat imaging on 01/06/2019 revealed stable findings. Neurosurgery has cleared for condition issue of antiplatelets/anticoagulation. Started aspirin for now and will consider starting warfarin for anticoagulation once further discussed risks versus benefits with patient's wife. Will avoid novel oral anticoagulants for now as there is no adequate antidote if patient was to have worsening bleeding. Can consider novel oral anticoagulants for use of administration in the next several weeks. Continue atorvastatin.        Fasting lipid profile: Total cholesterol 171 mg/dL. HDL 36 mg/dL.  mg/dL. Triglycerides 118 mg/dL.     Hemoglobin A1c: 7.1%.        MRI of the brain without contrast (01/02/2019):  Recent hemorrhagic infarct centered in the left basal ganglia, involving the left putamen, left globus pallidus, left internal capsule, left caudate head, in the medial aspect of the left temporal lobe.  Surrounding edema and associated mass effect with partial effacement of the left lateral ventricle.  Multiple small foci of abnormal T2 and FLAIR signal in the periventricular and subcortical white matter bilaterally.  Mild generalized cerebral atrophy.   Pituitary gland is not enlarged.   Cerebellar tonsils are in their normal location.   Ventricles are not enlarged.   Visualized orbits are intact.   Mild-moderate inflammatory changes in the paranasal sinuses, most prominent in the ethmoid air cells.  Flow voids in the distal internal carotid arteries and in the basilar artery.  IMPRESSION:   Recent  hemorrhagic infarct centered in the left basal ganglia, involving the left putamen, left globus pallidus, left internal capsule, left caudate head, and the medial aspect of the left temporal lobe. Associated mass affect with partial effacement of the left lateral ventricle. Findings are similar to the most recent CT head. Consider follow-up CT head to evaluate for interval change.  Evidence of patchy bilateral chronic white matter ischemia.  Mild generalized cerebral atrophy.    CT of the head without contrast (01/02/2019):  Evolving recent ischemia involving the left basal ganglia and left caudate head now with intervally developed patchy hemorrhagic conversion in the left basal ganglia. Minor localized mass effect resulting in slight compression of the left frontal horn. The right lateral ventricle appears slightly more prominent than prior exam although without overt hydrocephalus. There may be minor, 1-2 mm left to right midline shift which is new since prior.  Background cerebral atrophy and chronic patchy white matter hypoattenuation most likely reflecting chronic white matter small vessel ischemic changes are similar to prior. No evidence for new cortical-based ischemia or large extra-axial collection.  No acute bony abnormality.   Changes of chronic sinusitis as before.  IMPRESSION:   Impression:  1.   Evolving recent left basal ganglia/left caudate head ischemia now with new patchy hemorrhagic conversion in the left basal ganglia. There is some mild localized mass effect with slight new effacement of the left frontal horn of the lateral ventricle and mild increased prominence of the right lateral ventricle although without overt hydrocephalus at this time. New 1-2 mm left to right midline shift.   2.   Background cerebral atrophy and chronic microangiopathy.        Cerebral perfusion study with contrast (01/01/2018):  There is decreased cerebral blood volume and cerebral blood flow with increased mean  transit time in the territory of the left middle cerebral artery in the left temporal, frontal, and parietal lobes.  The core of the infarct is predominantly in the frontal lobe with additional involvement at the putamen, insular cortex, and parietal lobe representing 30-40% of the infarct territory. The majority of the infarct territory is consistent with penumbra and involves mostly the temporal lobe.  IMPRESSION:   Left MCA infarct with 30-40% of the infarct territory representing core. This predominantly involves the frontal lobe.        CT angiogram of the head (01/01/2019):  There is occlusion of the left middle cerebral artery at the bifurcation of the left internal carotid artery. There is opacification of the left A1 segment and anterior cerebral artery. The right middle cerebral and anterior cerebral arteries are patent. The basilar artery bifurcates normally. The posterior cerebral arteries are patent bilaterally.  There is the typical configuration at the aortic arch with the left common carotid artery having a separate origin from the arch. The vertebral arteries arise from their respective subclavian arteries. The right vertebral artery is dominant. The left vertebral artery is very small in caliber. There is severe (75-80%) stenosis in the proximal right internal carotid artery. No significant stenosis is seen in the left internal carotid artery.  No intracranial mass or mass effect is seen. No abnormal enhancement is noted in the brain parenchyma.  No mass or adenopathy is seen in the neck. Mucosal surfaces of the pharynx are symmetric. The airway is patent. No focal consolidation or effusion is seen in the upper lungs.  IMPRESSION:   1. 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  2. Severe stenosis in the proximal right internal carotid artery.    Echocardiogram, transthoracic (01/02/2018):  Left Ventricle:    Normal left ventricular cavity size. Normal left ventricular wall  thickness. Mildly decreased left ventricular     systolic function. Left ventricular ejection fraction is estimated at 40-45 %. Rhythm precludes evaluation of     diastolic function.   Right Ventricle:    Mildly increased right ventricular size. Normal right ventricular systolic function. Right ventricular systolic     pressure 31.5 mmHg.     RV basal-4.76cm    s wave-10    TAPSE-18   Right Atrium:    Mildly increased right atrial size.   Left Atrium:    Mildly increased left atrial size. Left atrial volume index 41.4 ml/m².   Mitral Valve:    Structurally normal mitral valve. Mitral annular calcification. Trace mitral valve regurgitation.   Aortic Valve:     Trileaflet aortic valve. No aortic valve regurgitation.   Tricuspid Valve:     Structurally normal tricuspid valve. Mild tricuspid valve regurgitation.   Pulmonic Valve:     Structurally normal pulmonic valve. Trace pulmonary valve regurgitation.   Pericardium:    No pericardial effusion.   Aorta:    Normal size aortic root and proximal ascending aorta.   Additional Findings:    A bubble study was done and appeared to be negative at rest and with valsalva.   CONCLUSIONS     Normal left ventricular cavity size.     Normal left ventricular wall thickness.     Mildly decreased left ventricular systolic function.     Left ventricular ejection fraction is estimated at 40-45 %.     Rhythm precludes evaluation of diastolic function.     Normal right ventricular systolic function.     Right ventricular systolic pressure 31.5 mmHg.     Mildly increased right atrial size.     Mildly increased left atrial size.     Trace mitral valve regurgitation.     Trileaflet aortic valve.     Mild tricuspid valve regurgitation.     Trace pulmonary valve regurgitation.     A bubble study was done and appeared to be negative at rest and with valsalva.            Cerebrovascular accident (CVA) due to embolism of left middle cerebral artery; left basal ganglia; hospitalization [facility]  1/2019  Chronic atrial fibrillation  Pure hypercholesterolemia  Chronic systolic heart failure  Chronic anticoagulation  -status post [facility] early January at [facility].  Outside records reviewed.  I am changing his warfarin to Eliquis.  On metoprolol for rate control.  Was started on pantoprazole presumably for possible stomach lining protective affect on anticoagulation, no history of GERD.  He missed his appointment with [facility].  He is agreeable to see [facility] for Cardiology and I have submitted a referral.  He has an upcoming follow-up with Neurology and Neurosurgery with the [facility] and he can keep those appointments.  Type 2 diabetes mellitus without complication, without long-term current use of insulin  -new diagnosis.  Foot exam done today.  He was told that he should wait until his residual deficits from his stroke settle down before seeing an eye doctor as he has decreased vision in his right eye and I think that that would be reasonable.  Dietary modification discussed.  Mediterranean diet handout provided.  For now focus on diet modification    Denies recent CP or SOB  EKG A-fib RBBB NSSTT changes      Review of Systems   Constitution: Negative for decreased appetite.   HENT: Negative for ear discharge.    Eyes: Negative for blurred vision.   Endocrine: Negative for polyphagia.   Skin: Negative for nail changes.   Genitourinary: Negative for bladder incontinence.   Neurological: Negative for aphonia.   Psychiatric/Behavioral: Negative for hallucinations.   Allergic/Immunologic: Negative for hives.        Objective:    Physical Exam   Constitutional: He is oriented to person, place, and time. He appears well-developed and well-nourished.   HENT:   Head: Normocephalic and atraumatic.   Eyes: Conjunctivae are normal. Pupils are equal, round, and reactive to light.   Neck: Normal range of motion. Neck supple.   Cardiovascular: Normal rate, normal heart sounds and intact  distal pulses. An irregularly irregular rhythm present.   Pulmonary/Chest: Effort normal and breath sounds normal.   Abdominal: Soft. Bowel sounds are normal.   Musculoskeletal: Normal range of motion.   Neurological: He is alert and oriented to person, place, and time.   Skin: Skin is warm and dry.         Assessment:       1. Cerebrovascular accident (CVA) due to embolism of left middle cerebral artery; left basal ganglia; hospitalization [facility] 1/2019    2. Chronic atrial fibrillation    3. Pure hypercholesterolemia    4. Chronic systolic heart failure new dx with CVA 1/2019 on TTE LVEF 40-45    5. Type 2 diabetes mellitus without complication, without long-term current use of insulin         Plan:       Chronic A-fib rate controlled - on xarelto  Moderate CM - currently with compensated CHF  OV 6 months

## 2019-02-19 NOTE — LETTER
February 19, 2019      Bethel Harley MD  [address]  [place] [address]           [facility] - Cardiology  [address] [facility] [address]  [place] [address]  Phone: [phone]          Patient: Kamar Doshi   MR Number: [id number]   YOB: 1950   Date of Visit: 2/19/2019       Dear Dr. Bethel Harley:    Thank you for referring Kamar Doshi to me for evaluation. Attached you will find relevant portions of my assessment and plan of care.    If you have questions, please do not hesitate to call me. I look forward to following Kamar Doshi along with you.    Sincerely,    Matthew Chandler MD    Enclosure  CC:  No Recipients    If you would like to receive this communication electronically, please contact [email or web address] or [phone] to request more information on [email or web address] Link access.    For providers and/or their staff who would like to refer a patient to [facility], please contact us through our one-stop-shop provider referral line, [facility], at [phone].    If you feel you have received this communication in error or would no longer like to receive these types of communications, please e-mail [email or web address]

## 2019-02-22 DIAGNOSIS — E11.9 TYPE 2 DIABETES MELLITUS WITHOUT COMPLICATION: ICD-10-CM

## 2019-03-15 ENCOUNTER — NURSE TRIAGE (OUTPATIENT)
Dept: ADMINISTRATIVE | Facility: CLINIC | Age: 69
End: 2019-03-15

## 2019-03-15 ENCOUNTER — OFFICE VISIT (OUTPATIENT)
Dept: FAMILY MEDICINE | Facility: CLINIC | Age: 69
End: 2019-03-15
Payer: MEDICARE

## 2019-03-15 VITALS
DIASTOLIC BLOOD PRESSURE: 114 MMHG | SYSTOLIC BLOOD PRESSURE: 162 MMHG | OXYGEN SATURATION: 96 % | HEART RATE: 76 BPM | RESPIRATION RATE: 16 BRPM | WEIGHT: 202.81 LBS | BODY MASS INDEX: 29.95 KG/M2

## 2019-03-15 DIAGNOSIS — I10 ESSENTIAL HYPERTENSION: Primary | ICD-10-CM

## 2019-03-15 DIAGNOSIS — R29.898 RIGHT ARM WEAKNESS: ICD-10-CM

## 2019-03-15 DIAGNOSIS — E11.59 TYPE 2 DIABETES MELLITUS WITH OTHER CIRCULATORY COMPLICATION, WITHOUT LONG-TERM CURRENT USE OF INSULIN: ICD-10-CM

## 2019-03-15 DIAGNOSIS — G47.30 SLEEP DISORDER BREATHING: ICD-10-CM

## 2019-03-15 PROCEDURE — 1101F PT FALLS ASSESS-DOCD LE1/YR: CPT | Mod: CPTII,S$GLB,, | Performed by: NURSE PRACTITIONER

## 2019-03-15 PROCEDURE — 1101F PR PT FALLS ASSESS DOC 0-1 FALLS W/OUT INJ PAST YR: ICD-10-PCS | Mod: CPTII,S$GLB,, | Performed by: NURSE PRACTITIONER

## 2019-03-15 PROCEDURE — 99213 OFFICE O/P EST LOW 20 MIN: CPT | Mod: S$GLB,,, | Performed by: NURSE PRACTITIONER

## 2019-03-15 PROCEDURE — 99999 PR PBB SHADOW E&M-EST. PATIENT-LVL III: CPT | Mod: PBBFAC,,, | Performed by: NURSE PRACTITIONER

## 2019-03-15 PROCEDURE — 99999 PR PBB SHADOW E&M-EST. PATIENT-LVL III: ICD-10-PCS | Mod: PBBFAC,,, | Performed by: NURSE PRACTITIONER

## 2019-03-15 PROCEDURE — 3080F DIAST BP >= 90 MM HG: CPT | Mod: CPTII,S$GLB,, | Performed by: NURSE PRACTITIONER

## 2019-03-15 PROCEDURE — 99213 PR OFFICE/OUTPT VISIT, EST, LEVL III, 20-29 MIN: ICD-10-PCS | Mod: S$GLB,,, | Performed by: NURSE PRACTITIONER

## 2019-03-15 PROCEDURE — 99499 UNLISTED E&M SERVICE: CPT | Mod: S$GLB,,, | Performed by: NURSE PRACTITIONER

## 2019-03-15 PROCEDURE — 3077F PR MOST RECENT SYSTOLIC BLOOD PRESSURE >= 140 MM HG: ICD-10-PCS | Mod: CPTII,S$GLB,, | Performed by: NURSE PRACTITIONER

## 2019-03-15 PROCEDURE — 3077F SYST BP >= 140 MM HG: CPT | Mod: CPTII,S$GLB,, | Performed by: NURSE PRACTITIONER

## 2019-03-15 PROCEDURE — 3080F PR MOST RECENT DIASTOLIC BLOOD PRESSURE >= 90 MM HG: ICD-10-PCS | Mod: CPTII,S$GLB,, | Performed by: NURSE PRACTITIONER

## 2019-03-15 PROCEDURE — 99499 RISK ADDL DX/OHS AUDIT: ICD-10-PCS | Mod: S$GLB,,, | Performed by: NURSE PRACTITIONER

## 2019-03-15 NOTE — TELEPHONE ENCOUNTER
Reason for Disposition   Systolic BP >= 180 OR Diastolic >= 110    Protocols used: HIGH BLOOD PRESSURE-A-OH    Kamar had CVA 01/19 (embolic), and sees Dr Edward Olvera [facility], for care since surgery for same.  Had appt with Dr Olvera yesterday, he said, and BP was 167/100 during that appt.  Dr Olvera told him to f/u with PCP today.  Today, Cely, his wife, said is 171/120.  All triage questions negative.  Appt not available with pcp, Dr Harley.  Appt set with Roberta King NP in [place].  Requested wait list for earlier appt, and message will be sent to Dr Harley.  If appt can be moved up, please call Cely at [phone].  Please contact caller directly with any additional care advice.    CALL BACK IF:  * Headache, blurred vision, difficulty talking, or difficulty walking occurs  * Chest pain or difficulty breathing occurs  * You want to go in to the office for a blood pressure check  * You become worse

## 2019-03-18 DIAGNOSIS — E11.9 TYPE 2 DIABETES MELLITUS WITHOUT COMPLICATION, UNSPECIFIED WHETHER LONG TERM INSULIN USE: ICD-10-CM

## 2019-03-20 ENCOUNTER — LAB VISIT (OUTPATIENT)
Dept: LAB | Facility: HOSPITAL | Age: 69
End: 2019-03-20
Attending: INTERNAL MEDICINE
Payer: MEDICARE

## 2019-03-20 DIAGNOSIS — E11.9 TYPE 2 DIABETES MELLITUS WITHOUT COMPLICATION: ICD-10-CM

## 2019-03-20 DIAGNOSIS — E11.59 TYPE 2 DIABETES MELLITUS WITH OTHER CIRCULATORY COMPLICATION, WITHOUT LONG-TERM CURRENT USE OF INSULIN: ICD-10-CM

## 2019-03-20 DIAGNOSIS — Z11.59 NEED FOR HEPATITIS C SCREENING TEST: ICD-10-CM

## 2019-03-20 LAB
ALBUMIN SERPL BCP-MCNC: 4 G/DL
ALP SERPL-CCNC: 82 U/L
ALT SERPL W/O P-5'-P-CCNC: 14 U/L
ANION GAP SERPL CALC-SCNC: 10 MMOL/L
AST SERPL-CCNC: 15 U/L
BILIRUB SERPL-MCNC: 1.1 MG/DL
BUN SERPL-MCNC: 18 MG/DL
CALCIUM SERPL-MCNC: 9.8 MG/DL
CHLORIDE SERPL-SCNC: 103 MMOL/L
CHOLEST SERPL-MCNC: 103 MG/DL
CHOLEST/HDLC SERPL: 3.2 {RATIO}
CO2 SERPL-SCNC: 29 MMOL/L
CREAT SERPL-MCNC: 0.9 MG/DL
EST. GFR  (AFRICAN AMERICAN): >60 ML/MIN/1.73 M^2
EST. GFR  (NON AFRICAN AMERICAN): >60 ML/MIN/1.73 M^2
ESTIMATED AVG GLUCOSE: 163 MG/DL
GLUCOSE SERPL-MCNC: 135 MG/DL
HBA1C MFR BLD HPLC: 7.3 %
HCV AB SERPL QL IA: NEGATIVE
HDLC SERPL-MCNC: 32 MG/DL
HDLC SERPL: 31.1 %
LDLC SERPL CALC-MCNC: 48.4 MG/DL
NONHDLC SERPL-MCNC: 71 MG/DL
POTASSIUM SERPL-SCNC: 3.3 MMOL/L
PROT SERPL-MCNC: 7.6 G/DL
SODIUM SERPL-SCNC: 142 MMOL/L
TRIGL SERPL-MCNC: 113 MG/DL

## 2019-03-20 PROCEDURE — 83036 HEMOGLOBIN GLYCOSYLATED A1C: CPT

## 2019-03-20 PROCEDURE — 36415 COLL VENOUS BLD VENIPUNCTURE: CPT | Mod: PO

## 2019-03-20 PROCEDURE — 86803 HEPATITIS C AB TEST: CPT

## 2019-03-20 PROCEDURE — 80053 COMPREHEN METABOLIC PANEL: CPT

## 2019-03-20 PROCEDURE — 80061 LIPID PANEL: CPT

## 2019-03-28 NOTE — PROGRESS NOTES
This note was created by combination of typed  and Dragon dictation.  Transcription errors may be present.  If there are any questions, please contact me.    Assessment & Plan:   Type 2 diabetes mellitus without complication, without long-term current use of insulin  -A1c has been above 7.0.  We talked about the benefits of weight loss, how 10 or 15 lb can make a big difference with his blood sugars.  It sounds like he is a bit skeptical that he is able to do so.  I have asked him to start eating more whole grains.  Already has stop drinking sweet drinks.  After discussion I am going to start him with metformin 500.  Urine with micro albuminuria.  I would repeat with next labs and if still persisting I would start low-dose ARB.  Borderline blood pressures today though better than what he is getting at home.  Continue home monitoring.  Referral to Optometry.  He notes some issues with visual acuity on the right, this is the side affected by his stroke.  -     metFORMIN (GLUCOPHAGE) 500 MG tablet; Take 1 tablet (500 mg total) by mouth daily with breakfast.  Dispense: 90 tablet; Refill: 1  -     Ambulatory referral to Optometry  -     Comprehensive metabolic panel; Future; Expected date: 06/27/2019  -     Hemoglobin A1c; Future; Expected date: 06/27/2019  -     Microalbumin/creatinine urine ratio; Future; Expected date: 06/27/2019    Smoker  -has been cigarette free since around January.  He is still using the high-dose nicotine patch and I will try him with a lower dose 14 mg patch.  Use that for 2 months and then new prescription for 7 mg patch thereafter.  -     nicotine (NICODERM CQ) 14 mg/24 hr; Place 1 patch onto the skin once daily.  Dispense: 30 patch; Refill: 1  -     nicotine (NICODERM CQ) 7 mg/24 hr; Place 1 patch onto the skin once daily.  Dispense: 30 patch; Refill: 2    He declines flu shot and pneumonia shot today.  Similarly he declines lung CT for screening and AAA screening.  I think he is a  bit overwhelmed and I asked him and his wife to think over this and readdress next visit.    Medications Discontinued During This Encounter   Medication Reason    nicotine (NICODERM CQ) 21 mg/24 hr Therapy completed       meds sent this encounter:  Medications Ordered This Encounter   Medications    metFORMIN (GLUCOPHAGE) 500 MG tablet     Sig: Take 1 tablet (500 mg total) by mouth daily with breakfast.     Dispense:  90 tablet     Refill:  1    nicotine (NICODERM CQ) 14 mg/24 hr     Sig: Place 1 patch onto the skin once daily.     Dispense:  30 patch     Refill:  1    nicotine (NICODERM CQ) 7 mg/24 hr     Sig: Place 1 patch onto the skin once daily.     Dispense:  30 patch     Refill:  2       Follow Up: No follow-ups on file.  Follow-up 3 months recall set.    Subjective:     Chief Complaint   Patient presents with    Follow-up       HPI  Kamar is a 68 y.o. male, last appointment with this clinic was 3/15/2019.    Seen 2/2019 NP  CVA, AFib, changed warfarin to Eliquis; metoprolol  Neuro - [facility]  New dx DM; TLC  lipitor    Saw cardiology - no changes.    Saw NP 3/15 - referred to OT  Ordered sleep study    previsit labs K low 3.3  Lipid good  a1c 7.3  Elevated microalbumin    Not on diuretic. Previous CMP with normal K.  Spurious?    Needs shots, lung CT, C scope, AAA screening, eye exam    Home readings - brachial cuff - systolic in the 180s.  Good pressure here in the office.  Nightly checks.  BP is better today on intake compared to home readings though still somewhat borderline.  We talked about possibly adding on ARB in the future.    Remains cigarette free since January.  Still using nicotine replacement.  High-dose.  Requesting a prescription to be sent in.  I will try him with lower dose    Was told to wait till post stroke re: glasses.  Having issues with right-sided vision.  This is the side that was affected by the stroke.    Reviewed his labs, A1c has been above 7.0.  Since his stroke  he has lost the taste for sweet drinks and thus drinks only water.  Feels like he has adequate vegetables.  Could improve whole grain intake.    Less physically active since his stroke.    Patient Care Team:  Bethel Harley MD as PCP - General (Internal Medicine)  Edward Olvera MD as Consulting Physician (Neurology)    Patient Active Problem List    Diagnosis Date Noted    Type 2 diabetes mellitus without complication, without long-term current use of insulin 02/01/2019    Chronic atrial fibrillation 02/01/2019    Pure hypercholesterolemia 02/01/2019    Chronic systolic heart failure new dx with CVA 1/2019 on TTE LVEF 40-45 02/01/2019     Echocardiogram, transthoracic (01/02/2018):  Left Ventricle: Normal left ventricular cavity size. Normal left ventricular wall thickness. Mildly decreased left ventricular systolic function. Left ventricular ejection fraction is estimated at 40-45 %. Rhythm precludes evaluation of diastolic function.   Right Ventricle:  Mildly increased right ventricular size. Normal right ventricular systolic function. Right ventricular systolic pressure 31.5 mmHg. RV basal-4.76cm    s wave-10    TAPSE-18   Right Atrium: Mildly increased right atrial size.   Left Atrium: Mildly increased left atrial size. Left atrial volume index 41.4 ml/m².   Mitral Valve: Structurally normal mitral valve. Mitral annular calcification. Trace mitral valve regurgitation.   Aortic Valve: Trileaflet aortic valve. No aortic valve regurgitation.   Tricuspid Valve: Structurally normal tricuspid valve. Mild tricuspid valve regurgitation.   Pulmonic Valve: Structurally normal pulmonic valve. Trace pulmonary valve regurgitation.   Pericardium: No pericardial effusion.   Aorta: Normal size aortic root and proximal ascending aorta.   Additional Findings: A bubble study was done and appeared to be negative at rest and with valsalva.   CONCLUSIONS     Normal left ventricular cavity size.     Normal left ventricular  wall thickness.     Mildly decreased left ventricular systolic function.     Left ventricular ejection fraction is estimated at 40-45 %.     Rhythm precludes evaluation of diastolic function.     Normal right ventricular systolic function.     Right ventricular systolic pressure 31.5 mmHg.     Mildly increased right atrial size.     Mildly increased left atrial size.     Trace mitral valve regurgitation.     Trileaflet aortic valve.     Mild tricuspid valve regurgitation.     Trace pulmonary valve regurgitation.     A bubble study was done and appeared to be negative at rest and with valsalva.       Cerebrovascular accident (CVA) due to embolism of left middle cerebral artery; left basal ganglia; hospitalization [facility] 1/2019 01/02/2019     Cerebral perfusion study with contrast (01/01/2019):  IMPRESSION:   Left MCA infarct with 30-40% of the infarct territory representing core. This predominantly involves the frontal lobe.      CT angiogram of the head (01/01/2019):  IMPRESSION:   1. 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  2. Severe stenosis in the proximal right internal carotid artery.    MRI of the brain without contrast (01/02/2019):   IMPRESSION:   Recent hemorrhagic infarct centered in the left basal ganglia, involving the left putamen, left globus pallidus, left internal capsule, left caudate head, and the medial aspect of the left temporal lobe. Associated mass affect with partial effacement of the left lateral ventricle. Findings are similar to the most recent CT head. Consider follow-up CT head to evaluate for interval change.  Evidence of patchy bilateral chronic white matter ischemia.  Mild generalized cerebral atrophy.    CT of the head without contrast (01/02/2019):  IMPRESSION:   Impression:  1.   Evolving recent left basal ganglia/left caudate head ischemia now with new patchy hemorrhagic conversion in the left basal ganglia. There is some mild localized mass effect with slight  new effacement of the left frontal horn of the lateral ventricle and mild increased prominence of the right lateral ventricle although without overt hydrocephalus at this time. New 1-2 mm left to right midline shift.   2.   Background cerebral atrophy and chronic microangiopathy.         PAST MEDICAL HISTORY:  Past Medical History:   Diagnosis Date    Atrial fibrillation     Clotting disorder     Hyperlipidemia     Hypertension     Stroke     Vision loss 2019       PAST SURGICAL HISTORY:  Past Surgical History:   Procedure Laterality Date    BRAIN SURGERY  2018       SOCIAL HISTORY:  Social History     Socioeconomic History    Marital status:      Spouse name: Cely Doshi    Number of children: Not on file    Years of education: Not on file    Highest education level: Not on file   Occupational History    Occupation: retired - ship builder -[employer]   Social Needs    Financial resource strain: Not on file    Food insecurity:     Worry: Not on file     Inability: Not on file    Transportation needs:     Medical: Not on file     Non-medical: Not on file   Tobacco Use    Smoking status: Former Smoker     Packs/day: 1.00     Years: 50.00     Pack years: 50.00     Types: Cigarettes     Start date:      Last attempt to quit: 2019     Years since quittin.2    Smokeless tobacco: Never Used   Substance and Sexual Activity    Alcohol use: No     Frequency: Never    Drug use: No    Sexual activity: Never   Lifestyle    Physical activity:     Days per week: Not on file     Minutes per session: Not on file    Stress: Not on file   Relationships    Social connections:     Talks on phone: Not on file     Gets together: Not on file     Attends Mosque service: Not on file     Active member of club or organization: Not on file     Attends meetings of clubs or organizations: Not on file     Relationship status: Not on file    Intimate partner violence:     Fear of  "current or ex partner: Not on file     Emotionally abused: Not on file     Physically abused: Not on file     Forced sexual activity: Not on file   Other Topics Concern    Not on file   Social History Narrative    Not on file       ALLERGIES AND MEDICATIONS: updated and reviewed.  Review of patient's allergies indicates:  No Known Allergies  Current Outpatient Medications   Medication Sig Dispense Refill    apixaban (ELIQUIS) 5 mg Tab Take 1 tablet (5 mg total) by mouth 2 (two) times daily. 30 tablet 5    atorvastatin (LIPITOR) 80 MG tablet Take 1 tablet (80 mg total) by mouth once daily. Take 1 tab by mouth every night 90 tablet 3    metoprolol tartrate (LOPRESSOR) 25 MG tablet Take 1 tablet (25 mg total) by mouth 2 (two) times daily. Take a 1/2 tab by mouth twice daily. 90 tablet 3    pantoprazole (PROTONIX) 40 MG tablet Take 1 tablet (40 mg total) by mouth once daily. 1 tab by mouth every morning before breakfast. 90 tablet 3    docusate sodium (COLACE) 100 MG capsule Take 100 mg by mouth 2 (two) times daily. 1 caps by mouth twice daily.      nicotine (NICODERM CQ) 21 mg/24 hr Place 1 patch onto the skin every 24 hours.       No current facility-administered medications for this visit.        Review of Systems   All other systems reviewed and are negative.      Objective:   Physical Exam   Vitals:    03/29/19 1033   Pulse: 82   Temp: 98.3 °F (36.8 °C)   TempSrc: Oral   SpO2: 97%   Weight: 92.7 kg (204 lb 4.1 oz)   Height: 5' 9" (1.753 m)    Body mass index is 30.16 kg/m².  Weight: 92.7 kg (204 lb 4.1 oz)   Height: 5' 9" (175.3 cm)     Physical Exam   Constitutional: He is oriented to person, place, and time. He appears well-developed and well-nourished. No distress.   Eyes: EOM are normal.   Cardiovascular: Normal rate, regular rhythm and normal heart sounds.   No murmur heard.  Pulmonary/Chest: Effort normal and breath sounds normal.   Musculoskeletal: Normal range of motion.   Neurological: He is alert " and oriented to person, place, and time.   Right-sided hemiplegia   Skin: Skin is warm and dry.   Psychiatric: He has a normal mood and affect. His behavior is normal. Thought content normal.        Component      Latest Ref Rng & Units 3/20/2019   Sodium      136 - 145 mmol/L 142   Potassium      3.5 - 5.1 mmol/L 3.3 (L)   Chloride      95 - 110 mmol/L 103   CO2      23 - 29 mmol/L 29   Glucose      70 - 110 mg/dL 135 (H)   BUN, Bld      8 - 23 mg/dL 18   Creatinine      0.5 - 1.4 mg/dL 0.9   Calcium      8.7 - 10.5 mg/dL 9.8   Total Protein      6.0 - 8.4 g/dL 7.6   Albumin      3.5 - 5.2 g/dL 4.0   Total Bilirubin      0.1 - 1.0 mg/dL 1.1 (H)   Alkaline Phosphatase      55 - 135 U/L 82   AST      10 - 40 U/L 15   ALT      10 - 44 U/L 14   Anion Gap      8 - 16 mmol/L 10   eGFR if African American      >60 mL/min/1.73 m:2 >60.0   eGFR if non African American      >60 mL/min/1.73 m:2 >60.0   Cholesterol      120 - 199 mg/dL 103 (L)   Triglycerides      30 - 150 mg/dL 113   HDL      40 - 75 mg/dL 32 (L)   LDL Cholesterol      63.0 - 159.0 mg/dL 48.4 (L)   HDL/Chol Ratio      20.0 - 50.0 % 31.1   Total Cholesterol/HDL Ratio      2.0 - 5.0 3.2   Non-HDL Cholesterol      mg/dL 71   Microalbum.,U,Random      ug/mL 122.0   Creatinine, Random Ur      23.0 - 375.0 mg/dL 198.0   Microalb Creat Ratio      0.0 - 30.0 ug/mg 61.6 (H)   Hemoglobin A1C External      4.0 - 5.6 % 7.3 (H)   Estimated Avg Glucose      68 - 131 mg/dL 163 (H)   Hepatitis C Ab       Negative

## 2019-03-29 ENCOUNTER — OFFICE VISIT (OUTPATIENT)
Dept: FAMILY MEDICINE | Facility: CLINIC | Age: 69
End: 2019-03-29
Payer: MEDICARE

## 2019-03-29 VITALS
WEIGHT: 204.25 LBS | OXYGEN SATURATION: 97 % | DIASTOLIC BLOOD PRESSURE: 88 MMHG | BODY MASS INDEX: 30.25 KG/M2 | HEIGHT: 69 IN | TEMPERATURE: 98 F | SYSTOLIC BLOOD PRESSURE: 136 MMHG | HEART RATE: 82 BPM

## 2019-03-29 DIAGNOSIS — F17.200 SMOKER: ICD-10-CM

## 2019-03-29 DIAGNOSIS — E11.9 TYPE 2 DIABETES MELLITUS WITHOUT COMPLICATION, WITHOUT LONG-TERM CURRENT USE OF INSULIN: Primary | ICD-10-CM

## 2019-03-29 PROCEDURE — 99214 OFFICE O/P EST MOD 30 MIN: CPT | Mod: S$GLB,,, | Performed by: INTERNAL MEDICINE

## 2019-03-29 PROCEDURE — 3079F PR MOST RECENT DIASTOLIC BLOOD PRESSURE 80-89 MM HG: ICD-10-PCS | Mod: CPTII,S$GLB,, | Performed by: INTERNAL MEDICINE

## 2019-03-29 PROCEDURE — 1101F PT FALLS ASSESS-DOCD LE1/YR: CPT | Mod: CPTII,S$GLB,, | Performed by: INTERNAL MEDICINE

## 2019-03-29 PROCEDURE — 99499 RISK ADDL DX/OHS AUDIT: ICD-10-PCS | Mod: S$GLB,,, | Performed by: INTERNAL MEDICINE

## 2019-03-29 PROCEDURE — 99214 PR OFFICE/OUTPT VISIT, EST, LEVL IV, 30-39 MIN: ICD-10-PCS | Mod: S$GLB,,, | Performed by: INTERNAL MEDICINE

## 2019-03-29 PROCEDURE — 99499 UNLISTED E&M SERVICE: CPT | Mod: S$GLB,,, | Performed by: INTERNAL MEDICINE

## 2019-03-29 PROCEDURE — 1101F PR PT FALLS ASSESS DOC 0-1 FALLS W/OUT INJ PAST YR: ICD-10-PCS | Mod: CPTII,S$GLB,, | Performed by: INTERNAL MEDICINE

## 2019-03-29 PROCEDURE — 3075F PR MOST RECENT SYSTOLIC BLOOD PRESS GE 130-139MM HG: ICD-10-PCS | Mod: CPTII,S$GLB,, | Performed by: INTERNAL MEDICINE

## 2019-03-29 PROCEDURE — 3045F PR MOST RECENT HEMOGLOBIN A1C LEVEL 7.0-9.0%: CPT | Mod: CPTII,S$GLB,, | Performed by: INTERNAL MEDICINE

## 2019-03-29 PROCEDURE — 3075F SYST BP GE 130 - 139MM HG: CPT | Mod: CPTII,S$GLB,, | Performed by: INTERNAL MEDICINE

## 2019-03-29 PROCEDURE — 3079F DIAST BP 80-89 MM HG: CPT | Mod: CPTII,S$GLB,, | Performed by: INTERNAL MEDICINE

## 2019-03-29 PROCEDURE — 99999 PR PBB SHADOW E&M-EST. PATIENT-LVL IV: ICD-10-PCS | Mod: PBBFAC,,, | Performed by: INTERNAL MEDICINE

## 2019-03-29 PROCEDURE — 3045F PR MOST RECENT HEMOGLOBIN A1C LEVEL 7.0-9.0%: ICD-10-PCS | Mod: CPTII,S$GLB,, | Performed by: INTERNAL MEDICINE

## 2019-03-29 PROCEDURE — 99999 PR PBB SHADOW E&M-EST. PATIENT-LVL IV: CPT | Mod: PBBFAC,,, | Performed by: INTERNAL MEDICINE

## 2019-03-29 RX ORDER — IBUPROFEN 200 MG
1 TABLET ORAL DAILY
Qty: 30 PATCH | Refills: 1 | Status: SHIPPED | OUTPATIENT
Start: 2019-03-29 | End: 2021-04-14 | Stop reason: ALTCHOICE

## 2019-03-29 RX ORDER — NICOTINE 7MG/24HR
1 PATCH, TRANSDERMAL 24 HOURS TRANSDERMAL DAILY
Qty: 30 PATCH | Refills: 2 | Status: SHIPPED | OUTPATIENT
Start: 2019-05-28 | End: 2021-04-14 | Stop reason: ALTCHOICE

## 2019-03-29 RX ORDER — METFORMIN HYDROCHLORIDE 500 MG/1
500 TABLET ORAL
Qty: 90 TABLET | Refills: 1 | Status: SHIPPED | OUTPATIENT
Start: 2019-03-29 | End: 2019-10-17 | Stop reason: SDUPTHER

## 2019-03-29 NOTE — PATIENT INSTRUCTIONS
Pneumococcal Vaccination  There are 2 pneumococcal vaccinations that protect people against pneumococcal disease.  Pneumococcal disease  Pneumococcal disease is caused by bacteria (Streptococcus pneumoniae). This germ is easily spread when someone with the bacteria coughs, sneezes, laughs, or talks. You can get pneumococcal disease more than once. This is because there are many different types (strains) of the bacteria. Some strains are also resistant to treatment with antibiotics.  There are different kinds of pneumococcal disease, depending on what part of the body is infected. They include:  · Pneumonia. Infection in the lungs.  · Meningitis. Infection of the covering of the brain and spinal cord.  · Otitis media. Infection of the middle ear.  · Bacteremia or septicemia. Infection in the blood.  Pneumococcal disease can be life-threatening, especially for people in high-risk groups. Each year, thousands of people die of this disease. Thousands more become seriously ill.  The vaccine    The pneumococcal vaccines are the best way to avoid pneumococcal disease. They are safe and effective. The vaccines are given as shots (injections). This can be done at your healthcare provider's office or a health clinic. Drugstores, senior centers, and workplaces often offer vaccinations, too. If you have questions, ask your healthcare provider.  The pneumococcal vaccines are recommended for:  · Persons 65 and older  · Infants  · People with chronic health problems (such as diabetes, chronic lung or heart disease, liver disease)  · People who have a cochlear implant  · People who have weakened immune systems  · People who live in nursing homes or other long-term care facilities  · People who smoke or have asthma  They are given:  · In a 4-dose series in infants  · One time in adults; some people need a second dose of one of the vaccines  Your healthcare provider can tell you more about the vaccines, whether you should get them,  and the number of shots you should get.  Date Last Reviewed: 11/1/2016  © 7670-0493 [employer], [email or web address]. [address] [place] [address], [place] [address]. All rights reserved. This information is not intended as a substitute for professional medical care. Always follow your healthcare professional's instructions.

## 2019-04-02 ENCOUNTER — TELEPHONE (OUTPATIENT)
Dept: OPTOMETRY | Facility: CLINIC | Age: 69
End: 2019-04-02

## 2019-04-04 ENCOUNTER — CLINICAL SUPPORT (OUTPATIENT)
Dept: REHABILITATION | Facility: HOSPITAL | Age: 69
End: 2019-04-04
Payer: MEDICARE

## 2019-04-04 DIAGNOSIS — R63.39 SELF-CARE DEFICIT FOR FEEDING: ICD-10-CM

## 2019-04-04 DIAGNOSIS — R29.898 RIGHT ARM WEAKNESS: ICD-10-CM

## 2019-04-04 DIAGNOSIS — R27.8 COORDINATION IMPAIRMENT: ICD-10-CM

## 2019-04-04 PROCEDURE — G8988 SELF CARE GOAL STATUS: HCPCS | Mod: CJ,PN

## 2019-04-04 PROCEDURE — G8987 SELF CARE CURRENT STATUS: HCPCS | Mod: CL,PN

## 2019-04-04 PROCEDURE — 97165 OT EVAL LOW COMPLEX 30 MIN: CPT | Mod: PN

## 2019-04-04 NOTE — PLAN OF CARE
[facility] Occupational Therapy  Initial Neurological Evaluation     Date: 4/4/2019  Patient: Kamar Doshi  Chart Number: [id number]    Therapy Diagnosis:   Encounter Diagnoses   Name Primary?    Right arm weakness     Coordination impairment     Self-care deficit for feeding      Physician: Roberta King NP    Physician Orders: Eval and Treat  Medical Diagnosis: right arm weakness  Evaluation Date: 4/4/2019  Plan of Care Expiration Period: 4/4/19 - 5/30/19  Insurance Authorization period Expiration: TBD  Date of Return to MD: not scheduled  Visit # / Visits Authorized: 1 / 1  FOTO: 5th visit    Time In:11:05 am  Time Out: 12:20 pm  Total Billable (one on one) Time: 75 minutes    Precautions: Standard    Subjective     History of Current Condition:  Pt had fallen at home alone, wife able to get in, right arm and leg weakness,    Right side weakness, visual issues, difficulty with memory and finding words  Involved Side: right  Dominant Side: Right  Date of Onset: January 2019  Surgical Procedure: -possible  Removal of clots in brain at   Imaging: none in our system  Previous Therapy:  2-3 weeks inpatient rehab, 6 weeks home health,  OT/PT/ST    Patient's Goals for Therapy: Be able to move his arm better    Pain:  Pain Related Behaviors Observed: no   Functional Pain Scale Rating 0-10:   n/a/10 on average  0/10 at best  4-5/10 at worst  Location: right shoulder pain  Description: Sharp  Aggravating Factors: right arm movement  Easing Factors: rest    Occupation:  Retired boat repair  Working presently: retired at 63  Duties: physical work    Functional Limitations/Social History:    Prior Level of Function: Independent with driving and 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    Past Surgical History:  Kamar Doshi  has a past surgical history that includes Brain surgery (01/01/2018).    Current Medications:  Kamar has a current medication list which includes the following prescription(s): apixaban, atorvastatin, docusate sodium, metformin, metoprolol tartrate, nicotine, nicotine, and pantoprazole. eloquis    Allergies:  Review of patient's allergies indicates:  No Known Allergies     Other: n/a    Objective     Cognitive Exam:  Oriented: Person, Place and Situation  Behaviors: normal, cooperative  Follows Commands/attention: Follows one-step commands, difficulty with more then one step instructions  Communication: word finding issues  Memory: No Deficits noted as determined by 3 word recall after 1 minute and 3 minutes  Safety awareness/insight to disability: unaware of safety due to vision loss  Coping skills/emotional control: appropriate today although gets despondent     Visual/Perceptual:  Tracking: impaired  Saccades: intact in availble field  Acuity: wears prescription  R/L discrimination: intact  Visual field: impaired  Motor Planning Praxis: n/t      Physical Exam:  Postural examination/scapula alignment: Rounded shoulder and Head forward  Joint integrity: Hard end feel  Skin integrity: bruising and scratches  Edema: Mild right hand      Joint Evaluation  AROM  4/4/2019 AROM  4/4/2019     Left Right Right   Shoulder flex 0-180 120 80    Shoulder Abd 0-180 110 70    Shoulder ER 0-90 30 30 sb    Shoulder IR 0-90 To low back 45    Shoulder Extension 0-80 45 40    Shoulder Horizontal adduction 0-90 neutral neutral    Elbow flex/ext 0-150 WFL/WFL  WFL/-15    Wrist flex 0-80 WFL WFL    Wrist ext 0-70 WFL WFL    Supination 0-80 WFL WFL    Pronation 0-80 WFL WFL      Fist: right  tight   right hand 75% of fist   left hand full    Strength 4/4/2019 4/4/2019   **within available ROM** Left Right   Shoulder flex 5/5 3/5   Shoulder abd 4/5 3/5   Shoulder ER 4/5 3/5   Shoulder IR 5/5 4/5   Shoulder Extension 5/5    Shoulder Horizontal adduction5 5/5    Elbow flex 5/5 5/5   Elbow ext 5/5 5/5   Wrist flex 5/5 5/5   Wrist ext 5/5 5/5   Supination 5/5 4+/5   Pronation 5/5 4-/5      Strength: (PAIGE Dynamometer in lbs.) Average 3 trials, Position II:     4/4/2019 4/4/2019    Left Right   Rung II 45.1# 19.4#     Pinch Strength (Measured in psi)     4/4/2019 4/4/2019    Left Right   Key Pinch 20 psi 13 psi   3pt Pinch 16 psi 8 psi          Fine Motor Coordination: 9 Hole Peg Test  Left 4/4/2019 Right 4/4/2019    21 seconds no drops 1 min 5 seconds no drops     Gross motor coordination:   - ADRIAN (Rapid Alternating Movements): intact  - Finger to Nose (5 times): intact  - Finger Flicks (coordination moving from digit flexion to digit extension): impaired    Tone:  Modified Sweetie Scale:   0 - No increase in muscle tone      Sensation:  Kamar  reports a funny feeling in his hand at times  Light touch: bilateral intact  Sharp/Dull: n/t  Kinesthesia: bilateralintact  Proprioception: bilateral intact  Temperature: left impaired    Balance:   Static Sit - GOOD+: Takes MAXIMAL challenges from all directions.    Dynamic sit- GOOD: Takes MODERATE challenges from all directions  Static Stand - GOOD-: Takes MODERATE challenges from all directions but inconsistently  Dynamic stand - n/t    Endurance Deficit: mild                    Functional Status      Functional Mobility:  Bed mobility: I  Roll to left: I  Roll to right: I  Supine to sit: I  Sit to supine: I  Transfers to bed: I  Transfers to toilet: I  Car transfers: I  Wheelchair mobility: n/a    ADL's:  Feeding: Min  A  Grooming: I  Hygiene: I  UB Dressing: I  LB Dressing: I  Toileting: I  Bathing: I    IADL's:  Homecare: not usually performed  Cooking: unable at this time due to temperature sensation limitation  Laundry: not usually performed  Yard work: able to cut grass  Use of telephone: I  Money management: wife's duty  Medication management: D wife complete  Handwriting:able to write name  Technology Use: n/a  Comments:      CMS Impairment/Limitation/Restriction for FOTO neuro Survey    Therapist reviewed FOTO scores for Kamar Doshi on 4/4/2019.   FOTO documents entered into [employer] - see Media section.    Limitation Score:   Category: Self Care    Current : CL = least 60% but < 80% impaired, limited or restricted 63%  Goal: CJ = at least 20% but < 40% impaired, limited or restricted 40%  Discharge:          Treatment     Home Exercises and Patient Education Provided    Education provided:   -role of OT, goals for OT, scheduling/cancellations, insurance limitations with patient.  -Additional Education provided: recovery time for stroke and rehab plan    Written Home Exercises Provided: yes.  Exercises were reviewed and Kamar was able to demonstrate them prior to the end of the session.    Kamar demonstrated fair  understanding of the education provided.     See EMR under Patient Instructions for exercises provided 4/4/2019.    Assessment     Kamar Doshi is a 68 y.o. male referred to outpatient occupational therapy and presents with a medical diagnosis of CVA with right side weakness, resulting in pain, decreased flexibility, decreased range of motion, decreased muscle strength, impaired function and poor right hand coordination and demonstrates limitations as described in the chart below. Following medical record review it is determined that pt will benefit from occupational therapy services in order to maximize pain free and/or functional use of right UE.    Pt prognosis is Good due to  Recent CVA  Pt will  benefit from skilled outpatient Occupational Therapy to address the deficits stated above and in the chart below, provide pt/family education, and to maximize pt's level of independence.     Plan of care discussed with patient: Yes  Pt's spiritual, cultural and educational needs considered and patient is agreeable to the plan of care and goals as stated below:     Anticipated Barriers for therapy: pt's intermittent cognitive issues    Medical Necessity is demonstrated by the following  Profile and History Assessment of Occupational Performance Level of Clinical Decision Making Complexity Score   Occupational Profile:   Kamar Doshi is a 68 y.o. 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 Kamar Doshi has difficulty with  feeding  housework/household chores  affecting his/her daily functional abilities. His/her main goal for therapy is be able to use my arm better.     Comorbidities:   afib    Medical and Therapy History Review:   Brief               Performance Deficits    Physical:  Joint Mobility  Muscle Power/Strength   Strength  Pinch Strength  Fine Motor Coordination  Pain    Cognitive:  Communication  following directions    Psychosocial:    No Deficits     Clinical Decision Making:  low    Assessment Process:  Problem-Focused Assessments    Modification/Need for Assistance:  Minimal-Moderate Modifications/Assistance    Intervention Selection:  Several Treatment Options       low  Based on PMHX, co morbidities , data from assessments and functional level of assistance required with task and clinical presentation directly impacting function.       The following goals were discussed with the patient and patient is in agreement with them as to be addressed in the treatment plan.     Goals:  Short Term Goals: 4 weeks   Pt will be able to demonstrate his HEP in clinic with 100% accuracy  Pt will achieve a full active fist for holding household  items    Long Term Goals: 8 weeks   Increase ROM of  RUE to perform donning of jacket with ease,   Feeding will improve to  Mod I   Simple meal prep(making a sandwich) will improve to  I  Pt will improve right hand manipulation skill to 50 seconds to allow use of remote control with right hand      Plan   Certification Period/Plan of care expiration: 4/4/2019 to 5/30/19.    Outpatient Occupational Therapy 2 times weekly for 8 weeks to include the following interventions: Neuromuscular Re-ed, Therapeutic Exercise and modalities for right arm pain.    UMA [place]      I certify the need for these services furnished under this plan of treatment and while under my care.  ____________________________________ Physician/Referring Practitioner   Date of Signature

## 2019-04-09 ENCOUNTER — CLINICAL SUPPORT (OUTPATIENT)
Dept: REHABILITATION | Facility: HOSPITAL | Age: 69
End: 2019-04-09
Payer: MEDICARE

## 2019-04-09 DIAGNOSIS — R47.01 BROCA'S APHASIA: ICD-10-CM

## 2019-04-09 DIAGNOSIS — I63.412 CEREBROVASCULAR ACCIDENT (CVA) DUE TO EMBOLISM OF LEFT MIDDLE CEREBRAL ARTERY: Primary | ICD-10-CM

## 2019-04-09 DIAGNOSIS — R26.9 GAIT DIFFICULTY: ICD-10-CM

## 2019-04-09 DIAGNOSIS — R27.8 COORDINATION IMPAIRMENT: ICD-10-CM

## 2019-04-09 DIAGNOSIS — R63.39 SELF-CARE DEFICIT FOR FEEDING: ICD-10-CM

## 2019-04-09 DIAGNOSIS — R29.898 RIGHT ARM WEAKNESS: ICD-10-CM

## 2019-04-09 PROCEDURE — 97110 THERAPEUTIC EXERCISES: CPT | Mod: PN

## 2019-04-09 NOTE — PROGRESS NOTES
Occupational Therapy Daily Treatment Note     Date: 4/9/2019  Name: Kamar Doshi  Clinic Number: [id number]    Therapy Diagnosis:   Encounter Diagnoses   Name Primary?    Right arm weakness     Coordination impairment     Self-care deficit for feeding      Physician: Roberta King NP    Physician Orders: Eval and Treat  Medical Diagnosis: right arm weakness  Evaluation Date: 4/4/2019  Plan of Care Expiration Period: 4/4/19 - 5/30/19  Insurance Authorization period Expiration: TBD  Date of Return to MD: not scheduled  Visit # / Visits Authorized: 1 / 1  FOTO: 5th visit     Time In: 10:15am  Time Out: 11:08 am  Total Billable (one on one) Time: 53 minutes     Precautions: Standard      Subjective     Pt reports:  He is okay today  he was not compliant with home exercise program given last session.   Response to previous treatment: no complaints  Functional change: nothing reported    Pain: 0/10  Location: right shoulder      Objective     Kamar received therapeutic exercises for 53 minutes including:  -Sci fit performed with BUE constant verbal cues to hold right hand on handle and purpose of the exercise  -supine 3# dowel supine flexion x 2 sets 10 reps                               Chest press x 2 sets 10 reps                               tricep curl x 2 sets 10 reps -manual cues  -green theraband supine h. Abduction x 2 sets 10 reps  -passive stretch of right fingers into flexion  -2# wrist cuff right wrist for reaching cups in right back quadrant to lower left floor x 13 reps                                                                      Floor to shoulder height x 13 reps  -3# bicep curls 2 sets 10 reps - complained of right side abdomen pain with bicep curls  -ultra gripper setting # 2 x 3 min      Home Exercises and Education Provided     Education provided:   With wife re: sitting on his right side to increase awareness or right world  Referral has been placed for PT and ST    Written Home  Exercises Provided: Patient instructed to cont prior HEP.  Exercises were reviewed and Kamar was able to demonstrate them prior to the end of the session.  Kamar demonstrated fair  understanding of the HEP provided.   .   See EMR under Patient Instructions for exercises provided 4/4/19.        Assessment     Pt would continue to benefit from skilled OT. Pt requires manual and verbal cues/ reminders during ex for proper completion.  Pt is cooperative but has difficulty understanding and expressing himself.  Pt's right arm was tired after session.    Kamar is progressing  towards his goals and there are no updates to goals at this time. Pt prognosis is Good.     Pt will continue to benefit from skilled outpatient occupational therapy to address the deficits listed in the problem list on initial evaluation provide pt/family education and to maximize pt's level of independence in the home and community environment.     Anticipated barriers to occupational therapy: his aphasia    Pt's spiritual, cultural and educational needs considered and pt agreeable to plan of care and goals.    Goals:  Goals:  Short Term Goals: 4 weeks   Pt will be able to demonstrate his HEP in clinic with 100% accuracy  Pt will achieve a full active fist for holding household items     Long Term Goals: 8 weeks   Increase ROM of  RUE to perform donning of jacket with ease,   Feeding will improve to  Mod I   Simple meal prep(making a sandwich) will improve to  I  Pt will improve right hand manipulation skill to 50 seconds to allow use of remote control with right hand       Plan   Continue with plan of care  Updates/Grading for next session: additional HEP for right hand      UMA [place]

## 2019-04-12 ENCOUNTER — CLINICAL SUPPORT (OUTPATIENT)
Dept: REHABILITATION | Facility: HOSPITAL | Age: 69
End: 2019-04-12
Payer: MEDICARE

## 2019-04-12 DIAGNOSIS — R63.39 SELF-CARE DEFICIT FOR FEEDING: ICD-10-CM

## 2019-04-12 DIAGNOSIS — R29.898 RIGHT ARM WEAKNESS: ICD-10-CM

## 2019-04-12 DIAGNOSIS — R27.8 COORDINATION IMPAIRMENT: ICD-10-CM

## 2019-04-12 PROCEDURE — 97110 THERAPEUTIC EXERCISES: CPT | Mod: PN

## 2019-04-12 NOTE — PROGRESS NOTES
"  Occupational Therapy Daily Treatment Note     Date: 4/12/2019  Name: Kamar Doshi  Clinic Number: [id number]    Therapy Diagnosis:   Encounter Diagnoses   Name Primary?    Right arm weakness     Coordination impairment     Self-care deficit for feeding      Physician: Roberta King NP    Physician Orders: Eval and Treat  Medical Diagnosis: right arm weakness  Evaluation Date: 4/4/2019  Plan of Care Expiration Period: 4/4/19 - 5/30/19  Insurance Authorization period Expiration: TBD  Date of Return to MD: not scheduled  Visit # / Visits Authorized: 3/12 Exp 3/202  FOTO: 5th visit     Time In: 1010 am  Time Out: 1105 am  Total Billable (one on one) Time:  55 minutes     Precautions: Standard      Subjective     Pt reports:  "I'm doing OK"  he was not compliant with home exercise program given last session.   Response to previous treatment: no complaints  Functional change: nothing reported    Pain: 0/10  Location: NA    Objective     Kamar received therapeutic exercises for  minutes including:  -Sci fit performed 3 min forward and reverse with BUE constant verbal cues to hold right hand on handle and purpose of the exercise  -Supine PROM for shoulder flex IR and ER  -supine 5# dowel supine flexion x 2 sets 10 reps                               Chest press x 2 sets 10 reps                               tricep curl x 2 sets 10 reps 3#  -manual cues  -green theraband supine h. Abduction x 2 sets 10 reps  -passive stretch of right fingers into flexion  -2# wrist cuff right wrist for reaching cups in right back quadrant to lower left floor x 13 reps                                                                      Floor to shoulder height x 13 reps  -4 # bicep curls 2 sets 10 reps   -ultra gripper setting # 2 x 3 min  -Lego design copy for fine motor problem solving and attention to detail. 9 lego pieces required 2 verbal cues to complete first task improved and 0 cues send time with different design.  -Nut " and bolt board placed 10 nuts on bolts with R hand  -Theraband standing Rows Green 2x15 and shoulder ext 2x15  -Pulleys 4 min   Floor transfer with use of chair Mod I  Also I with donning and doffing shoes    Home Exercises and Education Provided     Education provided:   With wife re: sitting on his right side to increase awareness or right world  Pulleys may be use foul for shoulder ROM at home, spoke to wife  Referral has been placed for PT and ST wife aware    Written Home Exercises Provided: Patient instructed to cont prior HEP.  Exercises were reviewed and Kamar was able to demonstrate them prior to the end of the session.  Kamar demonstrated fair  understanding of the HEP provided.   .   See EMR under Patient Instructions for exercises provided 4/4/19.        Assessment     Pt would continue to benefit from skilled OT. Pt participated well today. Stated that his shoulder hurt less after ROM. Pt also participated well with lego construction task. He appeared to do better attending to R visual field today.       Kamar is progressing  towards his goals and there are no updates to goals at this time. Pt prognosis is Good.     Pt will continue to benefit from skilled outpatient occupational therapy to address the deficits listed in the problem list on initial evaluation provide pt/family education and to maximize pt's level of independence in the home and community environment.     Anticipated barriers to occupational therapy: his aphasia    Pt's spiritual, cultural and educational needs considered and pt agreeable to plan of care and goals.    Goals:  Goals:  Short Term Goals: 4 weeks   Pt will be able to demonstrate his HEP in clinic with 100% accuracy  Pt will achieve a full active fist for holding household items     Long Term Goals: 8 weeks   Increase ROM of  RUE to perform donning of jacket with ease,   Feeding will improve to  Mod I   Simple meal prep(making a sandwich) will improve to  I  Pt will  improve right hand manipulation skill to 50 seconds to allow use of remote control with right hand       Plan   Continue with plan of care  Updates/Grading for next session: additional HEP for right hand      UMA Carranza LOTR

## 2019-04-15 ENCOUNTER — CLINICAL SUPPORT (OUTPATIENT)
Dept: REHABILITATION | Facility: HOSPITAL | Age: 69
End: 2019-04-15
Payer: MEDICARE

## 2019-04-15 DIAGNOSIS — R29.898 RIGHT ARM WEAKNESS: ICD-10-CM

## 2019-04-15 DIAGNOSIS — Z79.01 CHRONIC ANTICOAGULATION: ICD-10-CM

## 2019-04-15 DIAGNOSIS — R63.39 SELF-CARE DEFICIT FOR FEEDING: ICD-10-CM

## 2019-04-15 DIAGNOSIS — R27.8 COORDINATION IMPAIRMENT: ICD-10-CM

## 2019-04-15 DIAGNOSIS — I48.20 CHRONIC ATRIAL FIBRILLATION: ICD-10-CM

## 2019-04-15 PROCEDURE — 97110 THERAPEUTIC EXERCISES: CPT | Mod: PN

## 2019-04-15 NOTE — PROGRESS NOTES
"  Occupational Therapy Daily Treatment Note     Date: 4/15/2019  Name: Kamar Doshi  Clinic Number: [id number]    Therapy Diagnosis:   Encounter Diagnoses   Name Primary?    Right arm weakness     Coordination impairment     Self-care deficit for feeding      Physician: Roberta King NP    Physician Orders: Eval and Treat  Medical Diagnosis: right arm weakness  Evaluation Date: 4/4/2019  Plan of Care Expiration Period: 4/4/19 - 5/30/19  Insurance Authorization period Expiration: TBD  Date of Return to MD: not scheduled  Visit # / Visits Authorized: 4/12 Exp 3/202  FOTO: 5th visit     Time In: 1100 am  Time Out:  1200 am  Total Billable (one on one) Time:  60 minutes     Precautions: Standard      Subjective     Pt reports:  "I'm doing alright today"  he was not compliant with home exercise program given last session.   Response to previous treatment: no complaints  Functional change: nothing reported    Pain: 0/10  Location: NA    Objective     Kamar received therapeutic exercises for 60  minutes including:  -Sci fit performed 3 min forward and reverse with BUE constant verbal cues to hold right hand on handle and purpose of the exercise  -Supine PROM for shoulder flex IR and ER  -supine 5# dowel supine flexion x 2 sets 10 reps                               Chest press x 2 sets 10 reps                               -green theraband supine h. Abduction x 2 sets 10 reps  -passive stretch of right fingers into flexion  -5# bicep curls 2 sets 10 reps   -ultra gripper setting # 2 x 3 min  -Lego design copy for fine motor problem solving and attention to detail. 10 lego pieces required 0 verbal cues to complete both  tasks .  -Nut and bolt board placed 10 nuts on bolts with R hand  -Theraband standing Rows Green 2x15 and shoulder ext 2x15  -Pulleys 4 min   Floor transfer with use of chair Mod I    Home Exercises and Education Provided     Education provided:   With wife re: sitting on his right side to " increase awareness or right world  Pulleys may be use foul for shoulder ROM at home, spoke to wife  Referral has been placed for PT and ST wife aware    Written Home Exercises Provided: Patient instructed to cont prior HEP.  Exercises were reviewed and Kamar was able to demonstrate them prior to the end of the session.  Kamar demonstrated fair  understanding of the HEP provided.   .   See EMR under Patient Instructions for exercises provided 4/4/19.        Assessment     Pt would continue to benefit from skilled OT. Pt participated well today. Stated that his shoulder hurt less today. Pt doing better with fine motor tasks as well.       Kamar is progressing  towards his goals and there are no updates to goals at this time. Pt prognosis is Good.     Pt will continue to benefit from skilled outpatient occupational therapy to address the deficits listed in the problem list on initial evaluation provide pt/family education and to maximize pt's level of independence in the home and community environment.     Anticipated barriers to occupational therapy: his aphasia    Pt's spiritual, cultural and educational needs considered and pt agreeable to plan of care and goals.    Goals:  Goals:  Short Term Goals: 4 weeks   Pt will be able to demonstrate his HEP in clinic with 100% accuracy  Pt will achieve a full active fist for holding household items     Long Term Goals: 8 weeks   Increase ROM of  RUE to perform donning of jacket with ease,   Feeding will improve to  Mod I   Simple meal prep(making a sandwich) will improve to  I  Pt will improve right hand manipulation skill to 50 seconds to allow use of remote control with right hand       Plan   Continue with plan of care  Updates/Grading for next session:     UMA Carranza LOTR

## 2019-04-23 ENCOUNTER — CLINICAL SUPPORT (OUTPATIENT)
Dept: REHABILITATION | Facility: HOSPITAL | Age: 69
End: 2019-04-23
Payer: MEDICARE

## 2019-04-23 DIAGNOSIS — R27.8 COORDINATION IMPAIRMENT: ICD-10-CM

## 2019-04-23 DIAGNOSIS — R63.39 SELF-CARE DEFICIT FOR FEEDING: ICD-10-CM

## 2019-04-23 DIAGNOSIS — R29.898 RIGHT ARM WEAKNESS: ICD-10-CM

## 2019-04-23 PROCEDURE — 97110 THERAPEUTIC EXERCISES: CPT | Mod: PN

## 2019-04-23 NOTE — PROGRESS NOTES
Occupational Therapy Daily Treatment Note     Date: 4/23/2019  Name: Kamar Doshi  Clinic Number: [id number]    Therapy Diagnosis:   Encounter Diagnoses   Name Primary?    Right arm weakness     Coordination impairment     Self-care deficit for feeding      Physician: Roberta King NP    Physician Orders: Eval and Treat  Medical Diagnosis: right arm weakness  Evaluation Date: 4/4/2019  Plan of Care Expiration Period: 4/4/19 - 5/30/19  Insurance Authorization period Expiration: 6/4/19  Date of Return to MD: not scheduled  Visit # / Visits Authorized: 5/12    FOTO: 5th visit     Time In: 10:00 am  Time Out:  11:00  am  Total Billable (one on one) Time:  60 minutes     Precautions: Standard      Subjective     Pt reports:  I am doing fine  he was not compliant with home exercise program given last session.   Response to previous treatment: no adverse affects reported  Functional change: nothing reported    Pain: 0/10  Location: NA    Objective     Kamar received therapeutic exercises for 60  minutes including:  -Sci fit performed 8 min forward direct with constant verbal cues to hold right hand on handle and purpose of the exercise  -Supine PROM for shoulder flex IR and ER 20 reps each motion  -active right finger flexion and extension x 10 reps  -supine 3# dowel Chest press x 2 sets 10 reps                               supine flexion x 2 sets 10 reps  - 2# wrist cuff moving cups from floor left to mat table right                                                        Right mat to floor right- c/o difficulty seeing cups on mat  -red therabar for  with bending bar x 3 min  - manipulation of game pieces for triangle game- guidance for completion of game required throughout even though pt has played it in the past.     Home Exercises and Education Provided     Education provided:   With wife re: sitting on his right side to increase awareness or right world  Referral has been placed for PT wife  instructed to contact appointment desk to schedule.     Written Home Exercises Provided: Patient instructed to cont prior HEP.  Exercises were reviewed and Kmaar was able to demonstrate them prior to the end of the session.  Kamar demonstrated fair  understanding of the HEP provided.   .   See EMR under Patient Instructions for exercises provided 4/4/19.        Assessment     Pt would continue to benefit from skilled OT.  Pt requires frequent verbal cues to focus on task and to perform quality work in therapy.  Pt continues with communication issues and word choices.     Kamar is progressing  towards his goals and there are no updates to goals at this time. Pt prognosis is Good.     Pt will continue to benefit from skilled outpatient occupational therapy to address the deficits listed in the problem list on initial evaluation provide pt/family education and to maximize pt's level of independence in the home and community environment.     Anticipated barriers to occupational therapy: his aphasia    Pt's spiritual, cultural and educational needs considered and pt agreeable to plan of care and goals.    Goals:  Goals:  Short Term Goals: 4 weeks   Pt will be able to demonstrate his HEP in clinic with 100% accuracy  Pt will achieve a full active fist for holding household items     Long Term Goals: 8 weeks   Increase ROM of  RUE to perform donning of jacket with ease,   Feeding will improve to  Mod I   Simple meal prep(making a sandwich) will improve to  I  Pt will improve right hand manipulation skill to 50 seconds to allow use of remote control with right hand       Plan   Continue with plan of care  Updates/Grading for next session:     UMA [place]

## 2019-04-24 NOTE — PROGRESS NOTES
"  Occupational Therapy Daily Treatment Note     Date: 4/25/2019  Name: Kamar Doshi  Clinic Number: [id number]    Therapy Diagnosis:   Encounter Diagnoses   Name Primary?    Right arm weakness     Coordination impairment     Self-care deficit for feeding      Physician: Roberta King NP    Physician Orders: Eval and Treat  Medical Diagnosis: right arm weakness  Evaluation Date: 4/4/2019  Plan of Care Expiration Period: 4/4/19 - 5/30/19  Insurance Authorization period Expiration: 6/4/19  Date of Return to MD: not scheduled  Visit # / Visits Authorized: 6/12    FOTO: 5th visit     Time In: 10:10 am  Time Out:  11:05 am  Total Billable (one on one) Time:  55 minutes     Precautions: Standard      Subjective     Pt reports:  I am doing okay, my shoulder sometimes hurts.   he was not compliant with home exercise program given last session.   Response to previous treatment: no adverse affects reported  Functional change: nothing reported    Pain: 0/10 unable to rate pain   Location:  Right shoulder    Objective     Kamar received therapeutic exercises for 55  minutes including:  -Sci fit performed 8 min forward direct with  verbal cues to hold right hand on handle and purpose of the exercise  -Supine PROM for right shoulder flex IR, ER and [id number] reps each motion  -ball held between palms of hand in supine for active flexion  -supine 3# dowel Chest press x 2 sets 10 reps                               supine flexion x 2 sets 10 reps  - 2# wrist cuff moving cups from floor left to mat table right                                                        Right mat to floor right- c/o difficulty seeing cups on mat  -green therabar for  with bending bar x 2 min  -red therabar twisting x 2 min  -green therabar deviation x 2 min    Standing 1# wrist cuff moving cups from 6" height on right side to shelf shoulder height left and reverse x 12 reps each  Sled push/pull on turf x 30' x 5 reps  -4kg ball bounce and " catch on horizontal trampoline x 20 reps                 Bounce,  clap x 1,  catch x 20 reps with approximately 8 misses     Home Exercises and Education Provided     Education provided:    - recovery time from a stroke     Written Home Exercises Provided: Patient instructed to cont prior HEP.  Exercises were reviewed and Kamar was able to demonstrate them prior to the end of the session.  Kamar demonstrated fair  understanding of the HEP provided.   .   See EMR under Patient Instructions for exercises provided 4/4/19.        Assessment     Pt would continue to benefit from skilled OT. Pt easily distracted from activity performing.  Pt demonstrating improvement in control of resistance during exercise. Pt cooperative during therapy session.   Kamar is progressing  towards his goals and there are no updates to goals at this time. Pt prognosis is Good.     Pt will continue to benefit from skilled outpatient occupational therapy to address the deficits listed in the problem list on initial evaluation provide pt/family education and to maximize pt's level of independence in the home and community environment.     Anticipated barriers to occupational therapy: his aphasia    Pt's spiritual, cultural and educational needs considered and pt agreeable to plan of care and goals.    Goals:  Goals:  Short Term Goals: 4 weeks   Pt will be able to demonstrate his HEP in clinic with 100% accuracy  Pt will achieve a full active fist for holding household items     Long Term Goals: 8 weeks   Increase ROM of  RUE to perform donning of jacket with ease,   Feeding will improve to  Mod I   Simple meal prep(making a sandwich) will improve to  I  Pt will improve right hand manipulation skill to 50 seconds to allow use of remote control with right hand       Plan   Continue with plan of care  Updates/Grading for next session:     UMA [place]

## 2019-04-25 ENCOUNTER — CLINICAL SUPPORT (OUTPATIENT)
Dept: REHABILITATION | Facility: HOSPITAL | Age: 69
End: 2019-04-25
Payer: MEDICARE

## 2019-04-25 DIAGNOSIS — R63.39 SELF-CARE DEFICIT FOR FEEDING: ICD-10-CM

## 2019-04-25 DIAGNOSIS — R27.8 COORDINATION IMPAIRMENT: ICD-10-CM

## 2019-04-25 DIAGNOSIS — R29.898 RIGHT ARM WEAKNESS: ICD-10-CM

## 2019-04-25 PROCEDURE — 97110 THERAPEUTIC EXERCISES: CPT | Mod: PN

## 2019-04-29 NOTE — PROGRESS NOTES
Occupational Therapy Daily Treatment Note     Date: 4/30/2019  Name: Kamar Doshi  Clinic Number: [id number]    Therapy Diagnosis:   Encounter Diagnoses   Name Primary?    Right arm weakness     Coordination impairment     Self-care deficit for feeding      Physician: Roberta King NP    Physician Orders: Eval and Treat  Medical Diagnosis: right arm weakness  Evaluation Date: 4/4/2019  Plan of Care Expiration Period: 4/4/19 - 5/30/19  Insurance Authorization period Expiration: 6/4/19  Date of Return to MD: not scheduled  Visit # / Visits Authorized: 7/12    FOTO: 10th visit     Time In: 11:00  am  Time Out:  12:00 pm  Total Billable (one on one) Time:  60 minutes     Precautions: Standard      Subjective     Pt reports: Even if I had pain I wouldn't tell you because it is just a part of life.   he was not compliant with home exercise program given last session.   Response to previous treatment: no adverse affects reported  Functional change: wife noticed he made his coffee more easily this morning    Pain: 0/10 unable to rate pain   Location:  Right shoulder    Objective     Kamar received therapeutic exercises for 60 minutes including:  -Sci fit performed 8 min forward direct with  verbal cues to hold right hand on handle and purpose of the exercise  -Supine PROM for right shoulder flex IR, ER and flexion 20 reps each motion  -supine flexion with cane x 10 reps  -wgt bearing on right hand/wrist with placement of cups in 2 different locations x 2 sets of 12 reps  -6 kg ball elbow flexion/extension x 2 sets 10 reps  -6kg ball reach forward arms only, no trunk x 2 sets 10 rpes  -green therabar for supination and pronation x 2 sets 10 reps each  -red therabar wrist flex BUE x 2 sets 10 reps  - yellow therabar right wrist flex /extension isolated x 2 sets 10 reps  Sitting1# wrist cuff moving cups from  right side to shoulder height left  x 12 reps each                                                       From left to right forward reach x 12 reps each    Ergo gripper 2 red bands x 4 min    Removal and replacement of game pieces for triangle game with right hand- pt attempted multiple times to use left hand.    Fine Motor Coordination: 9 Hole Peg Test  Left 4/4/2019 Right 4/4/2019 right 4/30/19    21 seconds no drops 1 min 5 seconds no drops  45 seconds no drops        Home Exercises and Education Provided     Education provided:    -importance of using his right hand     Written Home Exercises Provided: Patient instructed to cont prior HEP.  Exercises were reviewed and Kamar was able to demonstrate them prior to the end of the session.  Kamar demonstrated fair  understanding of the HEP provided.   .   See EMR under Patient Instructions for exercises provided 4/4/19.        Assessment     Pt would continue to benefit from skilled OT. Pt easily distracted from activity performing. Pt demonstrating improvement in manipulation skills with Right non dominant hand.  Pt will compensate during therapy by attempting tasks with his uninvolved left hand.     Kamar is progressing  towards his goals and there are no updates to goals at this time. Pt prognosis is Good.     Pt will continue to benefit from skilled outpatient occupational therapy to address the deficits listed in the problem list on initial evaluation provide pt/family education and to maximize pt's level of independence in the home and community environment.     Anticipated barriers to occupational therapy: his aphasia    Pt's spiritual, cultural and educational needs considered and pt agreeable to plan of care and goals.    Goals:  Short Term Goals: 4 weeks   Pt will be able to demonstrate his HEP in clinic with 100% accuracy  Pt will achieve a full active fist for holding household items     Long Term Goals: 8 weeks   Increase ROM of  RUE to perform donning of jacket with ease,   Feeding will improve to  Mod I   Simple meal prep(making a sandwich) will  improve to  I  Pt will improve right hand manipulation skill to 50 seconds to allow use of remote control with right hand       Plan   Continue with plan of care  Updates/Grading for next session:     UMA [place]

## 2019-04-30 ENCOUNTER — CLINICAL SUPPORT (OUTPATIENT)
Dept: REHABILITATION | Facility: HOSPITAL | Age: 69
End: 2019-04-30
Payer: MEDICARE

## 2019-04-30 DIAGNOSIS — R29.898 RIGHT ARM WEAKNESS: ICD-10-CM

## 2019-04-30 DIAGNOSIS — R63.39 SELF-CARE DEFICIT FOR FEEDING: ICD-10-CM

## 2019-04-30 DIAGNOSIS — R27.8 COORDINATION IMPAIRMENT: ICD-10-CM

## 2019-04-30 PROCEDURE — 97110 THERAPEUTIC EXERCISES: CPT | Mod: PN

## 2019-05-03 ENCOUNTER — CLINICAL SUPPORT (OUTPATIENT)
Dept: REHABILITATION | Facility: HOSPITAL | Age: 69
End: 2019-05-03
Attending: INTERNAL MEDICINE
Payer: MEDICARE

## 2019-05-03 DIAGNOSIS — R47.01 BROCA'S APHASIA: ICD-10-CM

## 2019-05-03 PROCEDURE — G9159 LANG COMP CURRENT STATUS: HCPCS | Mod: CJ,PN

## 2019-05-03 PROCEDURE — 92523 SPEECH SOUND LANG COMPREHEN: CPT | Mod: PN

## 2019-05-03 PROCEDURE — G9160 LANG COMP GOAL STATUS: HCPCS | Mod: CI,PN
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
## 2019-05-06 NOTE — PROGRESS NOTES
Patient Name: Kamar Doshi    : 1950  MRN: [id number]    Subjective:  Kamar is a 68 y.o. male who presents today for     1. Uncontrolled high blood pressure. He has not taken his medications yet. He  was asymptomatic at visit.     Past Medical History  Past Medical History:   Diagnosis Date    Atrial fibrillation     Clotting disorder     Hyperlipidemia     Hypertension     Stroke     Vision loss 2019       Past Surgical History  Past Surgical History:   Procedure Laterality Date    BRAIN SURGERY  2018       Family History  Family History   Problem Relation Age of Onset    Arthritis Mother     Arthritis Father     Heart disease Father     No Known Problems Sister     COPD Brother     No Known Problems Sister     Peripheral vascular disease Sister     Alcohol abuse Brother        Social History  Social History     Socioeconomic History    Marital status:      Spouse name: Cely Doshi    Number of children: Not on file    Years of education: Not on file    Highest education level: Not on file   Occupational History    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 [employer]   Social Needs    Financial resource strain: Not on file    Food insecurity:     Worry: Not on file     Inability: Not on file    Transportation needs:     Medical: Not on file     Non-medical: Not on file   Tobacco Use    Smoking status: Former Smoker     Packs/day: 1.00     Years: 50.00     Pack years: 50.00     Types: Cigarettes     Start date:      Last attempt to quit: 2019     Years since quittin.3    Smokeless tobacco: Never Used   Substance and Sexual Activity    Alcohol use: No     Frequency: Never    Drug use: No    Sexual activity: Never   Lifestyle    Physical activity:     Days per week: Not on file     Minutes per session: Not on file    Stress: Not on file   Relationships    Social connections:     Talks on phone: Not on file     Gets together: Not on file      Attends Jewish service: Not on file     Active member of club or organization: Not on file     Attends meetings of clubs or organizations: Not on file     Relationship status: Not on file   Other Topics Concern    Not on file   Social History Narrative    Not on file       Allergies  Review of patient's allergies indicates:  No Known Allergies -reviewed and updated      Medications  Reviewed and updated.   Current Outpatient Medications   Medication Sig Dispense Refill    atorvastatin (LIPITOR) 80 MG tablet Take 1 tablet (80 mg total) by mouth once daily. Take 1 tab by mouth every night 90 tablet 3    docusate sodium (COLACE) 100 MG capsule Take 100 mg by mouth 2 (two) times daily. 1 caps by mouth twice daily.      metoprolol tartrate (LOPRESSOR) 25 MG tablet Take 1 tablet (25 mg total) by mouth 2 (two) times daily. Take a 1/2 tab by mouth twice daily. 90 tablet 3    pantoprazole (PROTONIX) 40 MG tablet Take 1 tablet (40 mg total) by mouth once daily. 1 tab by mouth every morning before breakfast. 90 tablet 3    apixaban (ELIQUIS) 5 mg Tab Take 1 tablet (5 mg total) by mouth 2 (two) times daily. 60 tablet 5    metFORMIN (GLUCOPHAGE) 500 MG tablet Take 1 tablet (500 mg total) by mouth daily with breakfast. 90 tablet 1    nicotine (NICODERM CQ) 14 mg/24 hr Place 1 patch onto the skin once daily. 30 patch 1    [START ON 5/28/2019] nicotine (NICODERM CQ) 7 mg/24 hr Place 1 patch onto the skin once daily. 30 patch 2     No current facility-administered medications for this visit.          Review of Systems   Constitutional: Positive for malaise/fatigue.   Respiratory: Negative for shortness of breath.    Cardiovascular: Negative for chest pain.   Neurological: Positive for focal weakness (right arm-chronic). Negative for headaches.   Psychiatric/Behavioral:        Snoring         Physical Exam  Blood Pressure (Abnormal) 162/114   Pulse 76   Respiration 16   Weight 92 kg (202 lb 13.2 oz)   Oxygen  Saturation 96%   Body Mass Index 29.95 kg/m²   Physical Exam   Constitutional: He appears well-developed. No distress.   HENT:   Head: Normocephalic.   Nose: Nose normal.   Mouth/Throat: Oropharynx is clear and moist.   Eyes: Conjunctivae and EOM are normal.   Cardiovascular: Normal rate.   Irregular rhythm   Pulmonary/Chest: Effort normal and breath sounds normal.   Musculoskeletal: He exhibits no edema.   Skin: He is not diaphoretic.         Assessment/Plan:  Kamar Doshi is a 68 y.o. male who presents today for :    Essential hypertension  Pt instructed to take medications,  on red flags-ER otherwise f/u with pcp    Type 2 diabetes mellitus with other circulatory complication, without long-term current use of insulin  -     Comprehensive metabolic panel; Future; Expected date: 03/15/2019    Right arm weakness  -     Ambulatory referral to Occupational Medicine    Sleep disorder breathing  Pt with symptoms of snoring, fatigue with HTN, Stroke. In lab warranted due to stroke  -     Polysomnogram (CPAP will be added if patient meets diagnostic criteria.); Future        Follow-up follow up with PCP, sooner if BP continue 140/90.

## 2019-05-07 ENCOUNTER — CLINICAL SUPPORT (OUTPATIENT)
Dept: REHABILITATION | Facility: HOSPITAL | Age: 69
End: 2019-05-07
Attending: INTERNAL MEDICINE
Payer: MEDICARE

## 2019-05-07 DIAGNOSIS — R63.39 SELF-CARE DEFICIT FOR FEEDING: ICD-10-CM

## 2019-05-07 DIAGNOSIS — R29.898 RIGHT ARM WEAKNESS: ICD-10-CM

## 2019-05-07 DIAGNOSIS — R27.8 COORDINATION IMPAIRMENT: ICD-10-CM

## 2019-05-07 PROCEDURE — 97110 THERAPEUTIC EXERCISES: CPT | Mod: PN

## 2019-05-07 NOTE — PROGRESS NOTES
Occupational Therapy Progress Note     Date: 5/7/2019  Name: Kmaar Doshi  Clinic Number: [id number]    Therapy Diagnosis:   Encounter Diagnoses   Name Primary?    Right arm weakness     Coordination impairment     Self-care deficit for feeding      Physician: Roberta King NP    Physician Orders: Eval and Treat  Medical Diagnosis: right arm weakness  Evaluation Date: 4/4/2019  Plan of Care Expiration Period: 4/4/19 - 5/30/19  Insurance Authorization period Expiration: 6/4/19  Date of Return to MD: not scheduled  Visit # / Visits Authorized: 8/12    FOTO: 10th visit     Time In: 10:00  am  Time Out:  11:00 am  Total Billable (one on one) Time:  60 minutes     Precautions: Standard      Subjective     Pt reports:   I am doing okay but my words aren't right.  I try to move around during the day.    he was not compliant with home exercise program given last session.   Response to previous treatment: no adverse affects reported  Functional change: nothing reported    Pain: 0/10 unable to rate pain   Location:  Right shoulder    Spoke with his wife who states he melted a styrofoam plate in the microwave at home warming up food.  She also reports she feels he is having more difficulty getting up from a seated position the last couple of weeks. She has observed Guevara doing all of his self care without assistance.    Objective     Kamar received therapeutic exercises for 60 minutes including:  -Sci fit performed 8 min forward direct with  verbal cues to for directional changes  -Supine PROM for right shoulder flex IR, ER and flexion 10 reps each motion  -stretching of right digits into full fist x 10 reps hold x 5 seconds  -active right hand finger flexion x 10 reps  -6kg ball supine chest press x 2 sets of 10 reps  -6kg ball supine flexion x 2 sets of 10 reps  -6 kg ball seated elbow flexion/extension x 20 reps  -Ergo gripper 2 green bands x 4 min  - yellow therabar right wrist flex /extension isolated x 2  sets 10 reps  - cutting salmon putty with butter knife and fork x 16 cuts  -manipulate salmon putty pieces into balls - pt intermittently used table to shape balls    Fine Motor Coordination: 9 Hole Peg Test  Left 4/4/2019 Right 4/4/2019 right 4/30/19    21 seconds no drops 1 min 5 seconds no drops  45 seconds no drops        Home Exercises and Education Provided     Education provided:    -importance of using his right hand     Written Home Exercises Provided: Patient instructed to cont prior HEP.  Exercises were reviewed and Kamar was able to demonstrate them prior to the end of the session.  Kamar demonstrated fair  understanding of the HEP provided.   .   See EMR under Patient Instructions for exercises provided 4/4/19.        Assessment     Pt would continue to benefit from skilled OT. Pt with better control of weighted ball during therapy today.  Fair use of knife for cutting which required increase in time.    Kamar is progressing  towards his goals and there are no updates to goals at this time. Pt prognosis is Good.     Pt will continue to benefit from skilled outpatient occupational therapy to address the deficits listed in the problem list on initial evaluation provide pt/family education and to maximize pt's level of independence in the home and community environment.     Anticipated barriers to occupational therapy: his aphasia    Pt's spiritual, cultural and educational needs considered and pt agreeable to plan of care and goals.    Goals:  Short Term Goals: 4 weeks   Pt will be able to demonstrate his HEP in clinic with 100% accuracy-in progress  Pt will achieve a full active fist for holding household items- improving     Long Term Goals: 8 weeks   Increase ROM of  RUE to perform donning of jacket with ease- met 5/7/19  Feeding will improve to  Mod I - not met   Simple meal prep(making a sandwich) will improve to  I- not met  Pt will improve right hand manipulation skill to 50 seconds to allow use  of remote control with right hand - met 4/30/19       Plan   Continue with plan of care  Updates/Grading for next session:     UMA [place]

## 2019-05-08 NOTE — PROGRESS NOTES
Occupational Therapy Progress Note     Date: 5/9/2019  Name: Kamar Doshi  Clinic Number: [id number]    Therapy Diagnosis:   Encounter Diagnoses   Name Primary?    Right arm weakness     Coordination impairment     Self-care deficit for feeding      Physician: Roberta King NP    Physician Orders: Eval and Treat  Medical Diagnosis: right arm weakness  Evaluation Date: 4/4/2019  Plan of Care Expiration Period: 4/4/19 - 5/30/19  Insurance Authorization period Expiration: 6/4/19  Date of Return to MD: not scheduled  Visit # / Visits Authorized: 9/12    FOTO: 10th visit     Time In: 10:13  am  Time Out:  11:00 am  Total Billable (one on one) Time: 47 minutes     Precautions: Standard    Subjective     Pt reports:   My arm is okay, no pain  he was not compliant with home exercise program given last session.   Response to previous treatment: no adverse affects reported  Functional change: nothing reported    Pain: 0/10 unable to rate pain   Location:  Right shoulder    Objective     Kamar received therapeutic exercises for 47 minutes including:  -Sci fit performed 8 min forward direct with  verbal cues to for directional changes resistance level 2  -Pt performed wgt bearing on RUE in quadruped with alternating lifting hands x 10 reps  - quadruped crawl on mat forward and back for additional UE wgt bearing  - push to sit with RUE in right semi sidelying x 2 sets 10 reps  -stretching of right digits into full fist x 10 reps hold x 5 seconds  3# bicep curls in supinated forearm position x 20 reps                              Neutral forearm position x 20 rep- verbal cues for control of forearm with ex.   -6kg ball bounce with right hand on horizontal tramp x 30 reps  -Ergo gripper 2 green bands x 4 min     Home Exercises and Education Provided     Education provided:    -importance of using his right hand     Written Home Exercises Provided: Patient instructed to cont prior HEP.  Exercises were reviewed and  Kamar was able to demonstrate them prior to the end of the session.  Kamar demonstrated fair  understanding of the HEP provided.   .   See EMR under Patient Instructions for exercises provided 4/4/19.        Assessment     Pt would continue to benefit from skilled OT. Pt had difficulty with wgt bearing in quadruped position.  Gross motor coordination of RUE required multiple reps prior to efficient technique.  Kamar is progressing  towards his goals and there are no updates to goals at this time. Pt prognosis is Good.     Pt will continue to benefit from skilled outpatient occupational therapy to address the deficits listed in the problem list on initial evaluation provide pt/family education and to maximize pt's level of independence in the home and community environment.     Anticipated barriers to occupational therapy: his aphasia    Pt's spiritual, cultural and educational needs considered and pt agreeable to plan of care and goals.    Goals:  Short Term Goals: 4 weeks   Pt will be able to demonstrate his HEP in clinic with 100% accuracy-in progress  Pt will achieve a full active fist for holding household items- improving     Long Term Goals: 8 weeks   Increase ROM of  RUE to perform donning of jacket with ease- met 5/7/19  Feeding will improve to  Mod I - not met   Simple meal prep(making a sandwich) will improve to  I- not met  Pt will improve right hand manipulation skill to 50 seconds to allow use of remote control with right hand - met 4/30/19       Plan   Continue with plan of care  Updates/Grading for next session:     UMA [place]

## 2019-05-09 ENCOUNTER — CLINICAL SUPPORT (OUTPATIENT)
Dept: REHABILITATION | Facility: HOSPITAL | Age: 69
End: 2019-05-09
Attending: INTERNAL MEDICINE
Payer: MEDICARE

## 2019-05-09 DIAGNOSIS — R27.8 COORDINATION IMPAIRMENT: ICD-10-CM

## 2019-05-09 DIAGNOSIS — R63.39 SELF-CARE DEFICIT FOR FEEDING: ICD-10-CM

## 2019-05-09 DIAGNOSIS — R29.898 RIGHT ARM WEAKNESS: ICD-10-CM

## 2019-05-09 PROCEDURE — 97110 THERAPEUTIC EXERCISES: CPT | Mod: PN

## 2019-05-13 NOTE — PROGRESS NOTES
Occupational Therapy Daily Treatment Note     Date: 5/14/2019  Name: Kamar Doshi  Clinic Number: [id number]    Therapy Diagnosis:   Encounter Diagnoses   Name Primary?    Right arm weakness     Coordination impairment     Self-care deficit for feeding      Physician: Roberta King NP    Physician Orders: Eval and Treat  Medical Diagnosis: right arm weakness  Evaluation Date: 4/4/2019  Plan of Care Expiration Period: 4/4/19 - 5/30/19  Insurance Authorization period Expiration: 6/4/19  Date of Return to MD: not scheduled  Visit # / Visits Authorized: 10/12    FOTO: 10th visit     Time In: 11:05 am  Time Out:  12:00 pm  Total Billable (one on one) Time: 55 minutes     Precautions: Standard    Subjective     Pt reports:   I am doing okay, not having pain in my arm  he was not compliant with home exercise program given last session.   Response to previous treatment: no adverse affects reported  Functional change: nothing reported    Pain: 0/10 unable to rate pain   Location:  Right shoulder    Objective     Kamar received therapeutic exercises for 55 minutes including:  -Sci fit performed 8 min forward direct with  verbal cues to for directional changes resistance level 2  - Rocker board in quadruped for UE wgt bearing  -wgt bearing on RUE in quadruped with alternating lifting hands x 10 reps - prompting to sustain proper position   -wgt bearing with LUE placement of cups 2 different locations x 12 reps  Each  -stretching of right digits into full fist x 10 reps hold x 5 seconds  - active right hand flexion x 10 reps  -4# bicep curls in supinated forearm position x 20 reps                              Neutral forearm position x 20 rep- verbal cues for control of forearm with ex.   -yellow therabar for right wrist flex/ext x 2 min  -Ergo gripper 1 yellow 2 green bands x 4 min  -manipulation of items from green mixed therapy putty x        CMS Impairment/Limitation/Restriction for FOTO neuro  Survey     Therapist reviewed FOTO scores for Kamar Doshi on 5/14/2019.   FOTO documents entered into [employer] - see Media section.     Limitation Score:   Category: Self Care     Current : CL = least 60% but < 80% impaired, limited or restricted 46%  Goal: CJ = at least 20% but < 40% impaired, limited or restricted 40%  Discharge:              Home Exercises and Education Provided     Education provided:    -importance of using his right hand     Written Home Exercises Provided: Patient instructed to cont prior HEP.  Exercises were reviewed and Kamar was able to demonstrate them prior to the end of the session.  Kamar demonstrated fair  understanding of the HEP provided.   .   See EMR under Patient Instructions for exercises provided 4/4/19.        Assessment     Pt would continue to benefit from skilled OT. Pt with difficulty understanding some tasks requiring multiple directives.  Pt able to manipulate well with right hand this day.  Good tolerance to increase resistance with UE ex. .  Kamar is progressing  towards his goals and there are no updates to goals at this time. Pt prognosis is Good.     Pt will continue to benefit from skilled outpatient occupational therapy to address the deficits listed in the problem list on initial evaluation provide pt/family education and to maximize pt's level of independence in the home and community environment.     Anticipated barriers to occupational therapy: his aphasia    Pt's spiritual, cultural and educational needs considered and pt agreeable to plan of care and goals.    Goals:  Short Term Goals: 4 weeks   Pt will be able to demonstrate his HEP in clinic with 100% accuracy-in progress  Pt will achieve a full active fist for holding household items- improving     Long Term Goals: 8 weeks   Increase ROM of  RUE to perform donning of jacket with ease- met 5/7/19  Feeding will improve to  Mod I - not met   Simple meal prep(making a sandwich) will improve to  I- not  met  Pt will improve right hand manipulation skill to 50 seconds to allow use of remote control with right hand - met 4/30/19       Plan   Continue with plan of care  Updates/Grading for next session:     UMA [place]

## 2019-05-14 ENCOUNTER — CLINICAL SUPPORT (OUTPATIENT)
Dept: REHABILITATION | Facility: HOSPITAL | Age: 69
End: 2019-05-14
Attending: INTERNAL MEDICINE
Payer: MEDICARE

## 2019-05-14 DIAGNOSIS — R63.39 SELF-CARE DEFICIT FOR FEEDING: ICD-10-CM

## 2019-05-14 DIAGNOSIS — R29.898 RIGHT ARM WEAKNESS: ICD-10-CM

## 2019-05-14 DIAGNOSIS — R27.8 COORDINATION IMPAIRMENT: ICD-10-CM

## 2019-05-14 PROCEDURE — G8988 SELF CARE GOAL STATUS: HCPCS | Mod: CK,PN

## 2019-05-14 PROCEDURE — 97110 THERAPEUTIC EXERCISES: CPT | Mod: PN

## 2019-05-14 PROCEDURE — G8987 SELF CARE CURRENT STATUS: HCPCS | Mod: CK,PN

## 2019-05-16 NOTE — PROGRESS NOTES
"Outpatient Neurological Rehabilitation   Speech and Language Therapy Daily Note  Date:  5/17/2019     Name: Kamar Doshi   MRN: [id number]   Therapy Diagnosis:   Encounter Diagnosis   Name Primary?    Broca's aphasia    Physician: Bethel Harley MD  Physician Orders: ST eval and treat  Medical Diagnosis: Broca's aphasia [R47.01]     Visit # / Visits Authorized:  2 / 12   Date of Evaluation:  5/3/2019   Insurance Authorization Period: 4/03/19 to 6/03/19  Plan of Care Certification:    5/3/2019 to 6/28/19   Extended POC:  n/a   Visits Cancelled: 1  Visits No Show: 0    Time In:  0733  Time Out:  0815  Total Billable Time: 42 minutes     G-Code 2 / 10   Eval 5/3/19 - comprehension CJ/CI   Update        Precautions: Standard and Fall    Subjective:   Pt reports: "about this time"  When asked how he was doing.    Response to previous treatment: did not recall   Pain Scale:  0/10 on VAS currently.   Pain Location: n/a  Objective:   UNTIMED  Procedure Min.   Speech- Language- Voice Therapy  42   Total Untimed Units: 1  Charges Billed/# of units: 1    Short Term Goals: (4 weeks) Current Progress:   1. Pt will answer multiunit y/n Questions with 90% acc indly to improve auditory comtprehension.   Progressing/ Not Met 5/17/2019   60% acc indly, 90% acc given a repetition     2. Pt will identify right-left on body with 90% acc indly to improve comprehension of temporal directions.  Progressing/ Not Met 5/17/2019   Pt identified right and left on body with 50% acc given a post it note labeling right and left in visual field. Pt consistently identified right for left     3. Pt will follow 3 unit commands with 90% acc indly to improve auditory comprehension.  Progressing/ Not Met 5/17/2019   Not formally addressed     4. Pt will label items in pictures with 90%acc indly to improve confrontational naming.   Progressing/ Not Met 5/17/2019   80% acc indly, 100% acc given verbal cues.   Particular difficulty with labeling verbs "   5. Pt will complete word finding task (i.e. Creating subject, verb, object pairs in VNEST protocol) with 90% acc min A to improve word fluency.  Progressing/ Not Met 5/17/2019   33% acc indly, 100% acc given max cues    6. Pt will participate in Verb Network Strength Training for 2-4 verbs per session with min A to improve word fluency.   Progressing/ Not Met 5/17/2019   Verb Network Strength Training (VNEST) was introduced today. VNEST focuses on verbs, encouraging people to think of people who perform actions (agents) and the objects or the actions that are performed on (patients). The idea is that by focusing on verbs, which require connection to nouns, you can strengthen all the words in the mental network around the verb. In this therapy technique, there are 6 steps each verb is taken through.   Step 1: Pt provided 2/6  subjects and objects in verb triads IND'ly, 5/6  given cues.   Step 2: Pt read triads of words aloud with 66 % acc indly .  Step 3: pt expanded where with 100% acc indly, when with 100% acc, and why with 100% acc indly.   Step 4: pt identified whether sentences presented auditorily were correct or incorrect with 92% acc IND'ly.   Step 5: pt recalled the target verb with 0% acc IND'ly (0/1 ).  Step 6: not addressed  Verb- catch   7. Pt will participate in an assessment of reading and writing skills.   Progressing/ Not Met 5/17/2019   Not formally addressed          Patient Education/Response:   Purpose of each task reviewed. Pt verbalized understanding.    Assessment:   Kamar is progressing well towards his goals. Increased fluency in conversation today. Current goals remain appropriate. Goals to be updated as necessary.   Pt prognosis is Fair. Pt will continue to benefit from skilled outpatient speech and language therapy to address the deficits listed in the problem list on initial evaluation, provide pt/family education and to maximize pt's level of independence in the home and community  environment.     Medical necessity is demonstrated by the following IMPAIRMENTS:  Decreased efficiency and effecitveness of communication because of decreased verbal expression limits communication with known and unknown communication partners in social, medical, and emergent situations.   Barriers to Therapy: transportation; wife work schedule.  Pt's spiritual, cultural and educational needs considered and pt agreeable to plan of care and goals.    Plan:   Continue POC with focus on verbal fluency.    GAIL Selby, CCC-SLP   5/17/2019

## 2019-05-17 ENCOUNTER — CLINICAL SUPPORT (OUTPATIENT)
Dept: REHABILITATION | Facility: HOSPITAL | Age: 69
End: 2019-05-17
Attending: INTERNAL MEDICINE
Payer: MEDICARE

## 2019-05-17 DIAGNOSIS — R47.01 BROCA'S APHASIA: ICD-10-CM

## 2019-05-17 PROCEDURE — 92507 TX SP LANG VOICE COMM INDIV: CPT | Mod: PN

## 2019-05-21 ENCOUNTER — CLINICAL SUPPORT (OUTPATIENT)
Dept: REHABILITATION | Facility: HOSPITAL | Age: 69
End: 2019-05-21
Attending: INTERNAL MEDICINE
Payer: MEDICARE

## 2019-05-21 DIAGNOSIS — R29.898 RIGHT LEG WEAKNESS: Primary | ICD-10-CM

## 2019-05-21 DIAGNOSIS — R26.89 POOR BALANCE: ICD-10-CM

## 2019-05-21 PROCEDURE — 97161 PT EVAL LOW COMPLEX 20 MIN: CPT | Mod: PN

## 2019-05-21 PROCEDURE — 97110 THERAPEUTIC EXERCISES: CPT | Mod: PN

## 2019-05-21 NOTE — PLAN OF CARE
[facility]  Physical Therapy Neurological Rehabilitation Initial Evaluation    Name: Kamar Doshi  Clinic Number: [id number]    Therapy Diagnosis:   Encounter Diagnoses   Name Primary?    Right leg weakness Yes    Poor balance      Physician: Bethel Harley MD    Physician Orders: PT Eval   Medical Diagnosis from Referral: Cerebrovascular accident (CVA) due to embolism of left middle cerebral artery and gait difficulty  Evaluation Date: 5/21/2019  Authorization Period Expiration: 4/08/2020  Plan of Care Expiration: 8/21/2019  Visit # / Visits authorized: 1/ 1    Time In: 800 am  Time Out: 845 am  Total Billable Time: 45 minutes    Precautions: Standard, Fall and CVA    Subjective   Date of onset: Jan 1, 2019  History of current condition - Kamar reports: Pt's wife is main historian due to pt's aphasia. She reports they spent 22 days in the hospital in Jan. Prior to admit pt was fully independent. He has received home health PT prior to this and has been awaiting to be seen here.     Medical History:   Past Medical History:   Diagnosis Date    Atrial fibrillation     Clotting disorder     Hyperlipidemia     Hypertension     Stroke     Vision loss 01/01/2019       Surgical History:   Kamar Doshi  has a past surgical history that includes Brain surgery (01/01/2018).    Medications:   Kamar has a current medication list which includes the following prescription(s): apixaban, atorvastatin, docusate sodium, metformin, metoprolol tartrate, nicotine, nicotine, and pantoprazole.    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  Prior Level of Function: ind  Current Level of Function: mod I with increased time for completion and drags R LE    Pain:  Current 0/10, worst 0/10, best 0/10   Location: n/a  Description: n/a  Aggravating Factors: n/a  Easing Factors: n/a    Pts goals: "I want to get this side stronger" re: R side    Objective     Observation: pleasant and cooperative    Posture Alignment :forward head    ROM:   GROSS AROM/PROM  UPPER EXTREMITY  (R) UE: limited as follows: pt struggles to AROM bring R UE above 90 degrees  (L) UE: WFLs  LOWER EXTREMITY  (R) LE: WFLs  (L) LE: WFLs       Lower Extremity Strength  Right LE  Left LE    Hip flexion:  4/5 Hip flexion: 5/5   Knee extension: 4-/5 Knee extension: 5/5   Knee flexion: 4-/5 Knee flexion: 5/5   Ankle dorsiflexion:  3+/5 Ankle dorsiflexion: 5/5   Ankle plantarflexion:  4-/5 Ankle plantarflexion: 5/5   Hip abduction: 3-/5 Hip abduction: 4/5         Hip extension: 2/5 Hip extension: 3/5     Upper extremity strength:     Right Left   Shoulder Flexion: 3+/5 5/5   Shoulder Abduction: 4/5 5/5             Elbow Flexion: 4/5 5/5   Elbow Extension: 4-/5 5/5                    Functional Strength:   -30 sec sit to stand: 6 (fall risk, decreased functional LE strength)     Bed mobility: mod I    Transfers: mod I    Stairs: pt uses B handrails and reciprocal gait pattern with mod I     TU.4 seconds      BREWSTER Assessment  1. Sitting to Standing: 3 - able to stand independely using hands  2. Standing Unsupported: 4 - able to stand safely 2 minutes without hold  3. Sitting Unsupported: 4 - able to sit safely and securely 2 minutes  4. Standing to Sittin - sits safely with minimal use of hands  5. Pivot Transfer: 3 - able to transfer safely with definite use of hands  6. Standing with Eyes Closed: 4 - albe to stand 10 seconds safely  7. Standing with Feet Together: 4 - able to place feet together independently and stand 1 minute safely  8. Reaching Forward with " Outstretched Arm: 3 - can reach forward 12 cm/5 inches safely  9. Retrieving Object from Floor: 3 - able to pick slipper but needs supervision  10. Turning to Look Behind: 4 - looks behind from both sides and weights shifts well  11. Turning 360 Degrees: 3 - able to trun 360 safely one side only in 4 seconds or less  12. Placing Alternate Foot on Step: 4 - able to stand independently safely and complete 8 steps in 20 seconds  13. Standing with One Foot in Front: 0 - Looses balance while stepping or standing  14. Standing on One Foot: 1 - tries to lift leg and unable to hold 3 seconds but remains standing independently  Total: 44  Maximum: 56  (high fall risk)      Coordination:   Point to Point:    -Finger to Nose: mild impairment in R UE    Balance Assessment:-Single Leg Stance:    Right : <2 seconds  (fall risk)   Left:  5 seconds (fall risk)    Sensation: pt reports numbness and tingling but no sensory deficits notes to light touch    Gait Assessment:   · AD used: no AD; Assistance: mod I; Distance: approximately 100'    · GAIT DEVIATIONS:   Kamar displays the following deviations with ambulation: slightly decreased step length with L LE due to decreased weight bearing on R LE, decreased B arm swing   Impairments contributing to deviations: impaired motor control, decreased weight bearing on R LE, decreased balance in SLS      PT Evaluation Completed? Yes        CMS Impairment/Limitation/Restriction for FOTO Intake Survey    Therapist reviewed FOTO scores for Kamar Doshi on 5/21/2019.   FOTO documents entered into [employer] - see Media section.    Limitation Score: 54%  Category: Mobility    Current : CK = at least 40% but < 60% impaired, limited or restricted  Goal: CJ = at least 20% but < 40% impaired, limited or restricted  Discharge: CK = at least 40% but < 60% impaired, limited or restricted         TREATMENT   Treatment Time In: 835 am  Treatment Time Out: 845 am  Total Treatment time separate from  Evaluation: 10 minutes    Kamar received therapeutic exercises to develop strength and endurance for 10 minutes including:  +standing hip abduction RTB 3x15  +standing hip extension RTB 3x15  +standing heel raises, 3x15  +LAQ    Kamar participated in neuromuscular re-education activities to improve: Balance, Coordination and Posture for 0 minutes. The following activities were included:      Kamar participated in gait training to improve functional mobility and safety for 0  minutes, including:           Home Exercises and Patient Education Provided    Education provided:   - written HEP provided    Written Home Exercises Provided: yes.  Exercises were reviewed and Kamar was able to demonstrate them prior to the end of the session.  Kamar demonstrated fair  understanding of the education provided.     See EMR under Patient Instructions for exercises provided 5/21/2019.    Assessment   Kamar is a 68 y.o. male referred to outpatient Physical Therapy with a medical diagnosis of CVA due to embolism of L MCA. Pt presents to PT with the following impairments leading to his functional decline: decreased endurance, decreased balance, decreased strength in R LE and UE, decreased coordination, aphasia, and overall decreased mobility. Pt demonstrates some lack of insight into his deficits and he will require further education on safety with functional tasks. He scored a 44 on Richter Balance Assessment placing him at a high risk for fall and further warranting his need for skilled PT services. Pt amiable and motivated to return to [facility]. He appears to have good support, as pt's spouse present throughout evaluation.    Pt prognosis is Good.   Pt will benefit from skilled outpatient Physical Therapy to address the deficits stated above and in the chart below, provide pt/family education, and to maximize pt's level of independence.     Plan of care discussed with patient: Yes  Pt's spiritual, cultural and educational needs  considered and patient is agreeable to the plan of care and goals as stated below:     Anticipated Barriers for therapy: aphasia, therefore understanding of teaching/ carryover between sessions    Medical Necessity is demonstrated by the following  History  Co-morbidities and personal factors that may impact the plan of care Co-morbidities:   HTN and atrial fibriliation    Personal Factors:   age     low   Examination  Body Structures and Functions, activity limitations and participation restrictions that may impact the plan of care Body Regions:   lower extremities  upper extremities    Body Systems:    gross symmetry  strength  gross coordinated movement  balance  gait  motor control    Participation Restrictions:   Pt requires increased time for completion of all tasks.    Activity limitations:   Learning and applying knowledge  solving problems    General Tasks and Commands  undertaking a single task    Communication  communicating with/receiving spoken language    Mobility  lifting and carrying objects  fine hand use (grasping/picking up)  walking  driving (bike, car, motorcycle)    Self care  looking after one's health    Domestic Life  cooking  doing house work (cleaning house, washing dishes, laundry)  assisting others    Interactions/Relationships  complex interpersonal interactions    Life Areas  no deficits    Community and Social Life  community life  recreation and leisure         moderate   Clinical Presentation stable and uncomplicated low   Decision Making/ Complexity Score: low     Goals:  Short Term Goals to be achieved in 4 weeks:   1. Patient will demonstrate safety with ambulation tasks.   2. Patient will demonstrate independence with HEP.    3. Patient will increase ambulation distance to 300' with no incidence of R LE toe drag.    Long Term Goals to be achieved in 8 weeks:   1. Pt will improve SLS time to 10 seconds on B LE with supervision in order to increase step length in gait.  2. Pt will  increase score on BBS to 49 in order to decrease risk for fall.   3. Pt will decrease score on TUG to 8 seconds to better align him with normative for his age and gender.      Plan   Plan of care Certification: 5/21/2019 to 8/21/2019.    Outpatient Physical Therapy 1 times weekly for 8 weeks to include the following interventions: Electrical Stimulation possible use of Bioness if warranted, Gait Training, Manual Therapy, Neuromuscular Re-ed, Patient Education, Therapeutic Activites and Therapeutic Exercise.     Harrison Vicente, PT

## 2019-05-29 ENCOUNTER — CLINICAL SUPPORT (OUTPATIENT)
Dept: REHABILITATION | Facility: HOSPITAL | Age: 69
End: 2019-05-29
Attending: INTERNAL MEDICINE
Payer: MEDICARE

## 2019-05-29 DIAGNOSIS — R27.8 COORDINATION IMPAIRMENT: ICD-10-CM

## 2019-05-29 DIAGNOSIS — R63.39 SELF-CARE DEFICIT FOR FEEDING: ICD-10-CM

## 2019-05-29 DIAGNOSIS — R29.898 RIGHT ARM WEAKNESS: ICD-10-CM

## 2019-05-29 PROCEDURE — 97110 THERAPEUTIC EXERCISES: CPT | Mod: PN

## 2019-05-29 PROCEDURE — G8987 SELF CARE CURRENT STATUS: HCPCS | Mod: CK,PN

## 2019-05-29 PROCEDURE — G8988 SELF CARE GOAL STATUS: HCPCS | Mod: CK,PN

## 2019-05-29 NOTE — PROGRESS NOTES
Occupational Therapy Progress Note/POC     Date: 5/29/2019  Name: Kamar Doshi  Clinic Number: [id number]    Therapy Diagnosis:   Encounter Diagnoses   Name Primary?    Right arm weakness     Coordination impairment     Self-care deficit for feeding      Physician: Roberta King NP    Physician Orders: Eval and Treat  Medical Diagnosis: right arm weakness  Evaluation Date: 4/4/2019  Plan of Care Expiration Period: 4/4/19 - 5/30/19  Insurance Authorization period Expiration: 6/4/19  Date of Return to MD: not scheduled  Visit # / Visits Authorized:  11/12    FOTO: 15th visit     Time In: 1:04  pm  Time Out:  2:00  pm  Total Billable (one on one) Time:  56 minutes     Precautions: Standard      Subjective     Pt reports:   I am doing okay.    he was not compliant with home exercise program given last session.   Response to previous treatment: no complaints  Functional change: nothing reported    Pain: 0/10 unable to rate pain   Location:  Right shoulder    Objective     Kamar received therapeutic exercises for 56  minutes including:  -Sci fit performed 8 min forward direct with  verbal cues to for directional changes      Joint Evaluation  AROM  4/4/2019 AROM  4/4/2019  AROM 5/29/19     Left Right Right   Shoulder flex 0-180 120 80    100   Shoulder Abd 0-180 110 70    70   Shoulder ER 0-90 30 30 sb    55   Shoulder IR 0-90 To low back 45  To low back   Shoulder Extension 0-80 45 40    50   Shoulder Horizontal adduction 0-90 neutral neutral       Elbow flex/ext 0-150 WFL/WFL WFL/-15  WFL/-10   Wrist flex 0-80 WFL WFL     Wrist ext 0-70 WFL WFL     Supination 0-80 WFL WFL     Pronation 0-80 WFL WFL        Fist: right  tight   right hand 90% of fist, opposes thumb to all tips   left hand full     Strength 4/4/2019 4/4/2019 5/29/10   **within available ROM** Left Right   Right   Shoulder flex 5/5 3/5   4/5   Shoulder abd 4/5 3/5   4-/5   Shoulder ER 4/5 3/5   3+/5   Shoulder IR 5/5 4/5   3+/5   Shoulder  Extension 5/5      Shoulder Horizontal adduction5 5/5      Elbow flex 5/5 5/5    Elbow ext 5/5 5/5    Wrist flex 5/5 5/5    Wrist ext 5/5 5/5    Supination 5/5 4+/5   4/5   Pronation 5/5 4-/5   4-/5       Strength: (PAIGE Dynamometer in lbs.) Average 3 trials, Position II:       4/4/2019 4/4/2019 5/29/19     Left Right Right    Rung II 45.1# 19.4# 26.9#      Pinch Strength (Measured in psi)       4/4/2019 4/4/2019 5/29/19     Left Right Right    Key Pinch 20 psi 13 psi 13 psi   3pt Pinch 16 psi 8 psi 8.3 psi                Fine Motor Coordination: 9 Hole Peg Test  Left 4/4/2019 Right 4/4/2019 right 4/30/19 Right   5/29/19    21 seconds no drops 1 min 5 seconds no drops  45 seconds no drops 51 seconds  No drops     nichol scapular stabilization with RUE writing alphabet on table with wgt bearing  Active right shoulder flexion using towel on wall x 10 reps  Attempts for active abduction on wall with poor results  Supine active flexion unable to achieve greater then 90 due to shoulder pain  Supine dowel flexion x 2 sets 10 reps  Supine active punch to ceiling and lower x 10 reps fair eccentric control noted   Red theraband loop ER strengthening x 10 reps    CMS Impairment/Limitation/Restriction for FOTO neuro Survey     Therapist reviewed FOTO scores for Kamar Doshi on 5/29/2019.   FOTO documents entered into [employer] - see Media section.     Limitation Score:   Category: Self Care     Current : CL = least 60% but < 80% impaired, limited or restricted 55%(improved 8%)  Goal: CJ = at least 20% but < 40% impaired, limited or restricted 40%  Discharge:            Home Exercises and Education Provided     Education provided:    -importance of using his right hand     Written Home Exercises Provided: Patient instructed to cont prior HEP.  Exercises were reviewed and Kamar was able to demonstrate them prior to the end of the session.  Kamar demonstrated fair  understanding of the HEP provided.   .   See EMR under  Patient Instructions for exercises provided 4/4/19.        Assessment     Pt would continue to benefit from skilled OT.  Pt is demonstrating some improvement in AROM and strength of right arm although limitations remain with both.  He is able to complete all of his self care tasks but remains relatively inactive at home.  Pt is not engaged in home tasks.      Kamar is progressing  towards his goals and there are no updates to goals at this time. Pt prognosis is Good.     Pt will continue to benefit from skilled outpatient occupational therapy to address the deficits listed in the problem list on initial evaluation provide pt/family education and to maximize pt's level of independence in the home and community environment.     Anticipated barriers to occupational therapy: his aphasia    Pt's spiritual, cultural and educational needs considered and pt agreeable to plan of care and goals.    Goals:  Short Term Goals: 4 weeks   Pt will be able to demonstrate his HEP in clinic with 100% accuracy-in progress  Pt will achieve a full active fist for holding household items- improving     Long Term Goals: 8 weeks   Increase ROM of  RUE to perform donning of jacket with ease- met 5/7/19  Feeding will improve to  Mod I - not met   Simple meal prep(making a sandwich) will improve to  I- not met  Pt will improve right hand manipulation skill to 50 seconds to allow use of remote control with right hand - met 4/30/19     Modified Goals:  Short Term Goals:   Pt will be able to demonstrate his HEP in clinic with 100% accuracy   Pt will increase AROM of RUE x 30* of elevation   Pt will increase right shoulder  strength to 4+/5      Long Term Goals:  Pt will demonstrate elevation of RUE to reach kitchen cabinet to reach for cup with RUE 75% of attempts  Pt will be able to assist in carrying groceries into the house with RUE 2 x week or as needed   Pt will assist wife in folding clothes 1 x week with the use of right hand.     Plan    Certification Period/Plan of care expiration: 6/11/19 to 8/6/19  Pt to attend OT every other week secondary to all three disciplines require therapy which creates a financial burden.      UMA [place]

## 2019-05-29 NOTE — PLAN OF CARE
Outpatient Therapy Updated Plan of Care     Visit Date: 5/29/2019  Name: Kamar Doshi  Clinic Number: [id number]    Therapy Diagnosis:   Encounter Diagnoses   Name Primary?    Right arm weakness     Coordination impairment     Self-care deficit for feeding      Physician: Roberta King NP    Physician Orders: Eval and Treat  Medical Diagnosis: right arm weakness  Plan of Care Expiration Period:     Total Visits Received: 11  Cancelled Visits: 0  No Show Visits: 0    Current Certification Period: 4/4/19 - 5/30/19  Precautions:  standard  Visits from Evaluation Date: 11  Functional Level Prior to Evaluation:  Weakness in RUE, dependence with self care, decreased ROM, aphasic    Subjective     Update: Pt reports he is feeling okay.  C/o shoulder pain with active motion.    Objective     Update:   Joint Evaluation  AROM  4/4/2019 AROM  4/4/2019  AROM 5/29/19     Left Right Right   Shoulder flex 0-180 120 80    100   Shoulder Abd 0-180 110 70    70   Shoulder ER 0-90 30 30 sb    55   Shoulder IR 0-90 To low back 45  To low back   Shoulder Extension 0-80 45 40    50   Shoulder Horizontal adduction 0-90 neutral neutral       Elbow flex/ext 0-150 WFL/WFL WFL/-15  WFL/-10   Wrist flex 0-80 WFL WFL     Wrist ext 0-70 WFL WFL     Supination 0-80 WFL WFL     Pronation 0-80 WFL WFL        Fist: right  tight   right hand 90% of fist, opposes thumb to all tips   left hand full     Strength 4/4/2019 4/4/2019 5/29/10   **within available ROM** Left Right   Right   Shoulder flex 5/5 3/5   4/5   Shoulder abd 4/5 3/5   4-/5   Shoulder ER 4/5 3/5   3+/5   Shoulder IR 5/5 4/5   3+/5   Shoulder Extension 5/5      Shoulder Horizontal adduction5 5/5      Elbow flex 5/5 5/5    Elbow ext 5/5 5/5    Wrist flex 5/5 5/5    Wrist ext 5/5 5/5    Supination 5/5 4+/5   4/5   Pronation 5/5 4-/5   4-/5       Strength: (PAIGE Dynamometer in lbs.) Average 3 trials, Position II:       4/4/2019 4/4/2019 5/29/19     Left Right Right     Rung II 45.1# 19.4# 26.9#      Pinch Strength (Measured in psi)       4/4/2019 4/4/2019 5/29/19     Left Right Right    Key Pinch 20 psi 13 psi 13 psi   3pt Pinch 16 psi 8 psi 8.3 psi                Fine Motor Coordination: 9 Hole Peg Test  Left 4/4/2019 Right 4/4/2019 right 4/30/19 Right   5/29/19    21 seconds no drops 1 min 5 seconds no drops  45 seconds no drops 51 seconds  No drops     CMS Impairment/Limitation/Restriction for FOTO neuro Survey     Therapist reviewed FOTO scores for Kamar Doshi on 5/29/2019.   FOTO documents entered into [employer] - see Media section.     Limitation Score:   Category: Self Care     Current : CL = least 60% but < 80% impaired, limited or restricted 55%(improved 8%)  Goal: CJ = at least 20% but < 40% impaired, limited or restricted 40%  Discharge:            Assessment     Pt would continue to benefit from skilled OT.  Pt is demonstrating some improvement in AROM and strength of right arm although limitations remain with both.  He is able to complete all of his self care tasks but remains relatively inactive at home.  Pt is not engaged in home tasks.      Kamar is progressing  towards his goals and there are no updates to goals at this time. Pt prognosis is Good.     Pt will continue to benefit from skilled outpatient occupational therapy to address the deficits listed in the problem list on initial evaluation provide pt/family education and to maximize pt's level of independence in the home and community environment.     Anticipated barriers to occupational therapy: his aphasia    Pt's spiritual, cultural and educational needs considered and pt agreeable to plan of care and goals.    Goals:  Short Term Goals: 4 weeks   Pt will be able to demonstrate his HEP in clinic with 100% accuracy-in progress  Pt will achieve a full active fist for holding household items- improving     Long Term Goals: 8 weeks   Increase ROM of  RUE to perform donning of jacket with ease- met  5/7/19  Feeding will improve to  Mod I - not met   Simple meal prep(making a sandwich) will improve to  I- not met  Pt will improve right hand manipulation skill to 50 seconds to allow use of remote control with right hand - met 4/30/19     Modified Goals:  Short Term Goals:   Pt will be able to demonstrate his HEP in clinic with 100% accuracy   Pt will increase AROM of RUE x 30* of elevation   Pt will increase right shoulder  strength to 4+/5      Long Term Goals:  Pt will demonstrate elevation of RUE to reach kitchen cabinet to reach for cup with RUE 75% of attempts  Pt will be able to assist in carrying groceries into the house with RUE 2 x week or as needed   Pt will assist wife in folding clothes 1 x week with the use of right hand.       Plan     Updated Certification Period:  6/11/19 to 8/6/19  Recommended Treatment Plan: 1 times every other week for 8 weeks: Moist Heat/ Ice, Neuromuscular Re-ed, Therapeutic Activites and Therapeutic Exercise  Other Recommendations:     Leslie EATON  5/29/2019      I CERTIFY THE NEED FOR THESE SERVICES FURNISHED UNDER THIS PLAN OF TREATMENT AND WHILE UNDER MY CARE    Physician's comments:        Physician's Signature: ___________________________________________________

## 2019-06-05 ENCOUNTER — DOCUMENTATION ONLY (OUTPATIENT)
Dept: REHABILITATION | Facility: HOSPITAL | Age: 69
End: 2019-06-05

## 2019-06-05 NOTE — PROGRESS NOTES
Physical Therapy: No show/Cancellation of Visit  Date: 06/05/2019    Patient was a no show to today's PT appointment. Patient's next scheduled appointment is 6/7/2019.     Cancel: 0  No show: 1    Therapist: Harrison Vicente, PT

## 2019-06-06 NOTE — PROGRESS NOTES
Outpatient Neurological Rehabilitation   Speech and Language Therapy Daily Note / Progress Note  Date:  6/7/2019     Name: Kamar Doshi   MRN: [id number]   Therapy Diagnosis:   Encounter Diagnosis   Name Primary?    Broca's aphasia    Physician: Bethel Harley MD  Physician Orders: ST eval and treat  Medical Diagnosis: Broca's aphasia [R47.01]     Visit # / Visits Authorized:  3 / 12   Date of Evaluation:  5/3/2019   Insurance Authorization Period: 4/03/19 to 6/03/19  Plan of Care Certification:    5/3/2019 to 6/28/19   Extended POC:  n/a   Progress Note: 6/7/19  Visits Cancelled: 1  Visits No Show: 0    Time In:  0900   Time Out:  0944   Total Billable Time: 44 minutes     G-Code 3 / 10   Eval 5/3/19 - comprehension CJ/CI   Update 6/6/19 - Comprehension CJ/CI        Precautions: Standard and Fall    Subjective:   Pt reports: that he has good and bad days through gesture and verbal speech   Response to previous treatment: did not recall   Pain Scale:  0/10 on VAS currently.   Pain Location: n/a  Objective:   UNTIMED  Procedure Min.   Speech- Language- Voice Therapy  44    Total Untimed Units: 1  Charges Billed/# of units: 1    Short Term Goals: (4 weeks) Current Progress:   1. Pt will answer multiunit y/n Questions with 90% acc indly to improve auditory comtprehension.   Progressing/ Not Met 6/7/2019   60% acc indly, 90% acc given a repetition     2. Pt will identify right-left on body with 90% acc indly to improve comprehension of temporal directions.  Progressing/ Not Met 6/7/2019   Pt identified right and left on body with 50% acc given a post it note labeling right and left in visual field. Pt consistently identified right for left     3. Pt will follow 3 unit commands with 90% acc indly to improve auditory comprehension.  Progressing/ Not Met 6/7/2019   Not formally addressed     4. Pt will label items in pictures with 90%acc indly to improve confrontational naming.   Progressing/ Not Met 6/7/2019   80%  acc indly, 100% acc given verbal cues.   Particular difficulty with labeling verbs   5. Pt will complete word finding task (i.e. Creating subject, verb, object pairs in VNEST protocol) with 90% acc min A to improve word fluency.  Progressing/ Not Met 6/7/2019   33% acc indly, 100% acc given max cues    6. Pt will participate in Verb Network Strength Training for 2-4 verbs per session with min A to improve word fluency.   Progressing/ Not Met 6/7/2019   Not formally addressed     7. Pt will participate in an assessment of reading and writing skills.   Progressing/ Not Met 6/7/2019   Not formally addressed      8. New goal; pt will complete convergent naming tasks with 90% acc indly to improve verbal expression.   Progressing/ Not Met 6/7/2019    20% acc indly, 40% acc given a repetition, 100% acc given a repetition and a verbal cue.        Patient Education/Response:   Purpose of each task reviewed. Aphasia and how this diagnosis affects his functional conversation reviewed. Pt verbalized understanding.    Assessment:   Kamar is progressing well towards his goals. Pt making slow progress toward goals. Kamar remains motivated to improve. Continued education re: aphasia and his specific deficits necessary. . Current goals remain appropriate. Goals to be updated as necessary.   Pt prognosis is Fair. Pt will continue to benefit from skilled outpatient speech and language therapy to address the deficits listed in the problem list on initial evaluation, provide pt/family education and to maximize pt's level of independence in the home and community environment.     JENNIFER National Outcome Measures System (NOMS):   Spoken Language Comprehension  Current status: FCM:  LEVEL 5: The individual is able to understand communication in structured conversations with both familiar and unfamiliar communication partners. The individual occasionally requires minimal cueing to understand more complex sentences/messages. The individual  occasionally initiates the use of compensatory strategies when encountering difficulty. - CJ at least 20% < 40% impaired, limited or restricted  Projected status:  FCM: LEVEL 6: The individual is able to understand communication in most activities, but some limitations in comprehension are still apparent in vocational, avocational, and social activities. The individual rarely requires minimal cueing to understand complex sentences. The individual usually uses compensatory strategies when encountering difficulty.  -  CI at least 1% but less than 20% impaired, limited or restricted    Spoken Language Expression  Current status: FCM:  LEVEL 4: The individual is successfully able to initiate communication using spoken language in simple, structured conversations in routine daily activities with familiar communication partners. The individual usually requires moderate cueing, but is able to demonstrate use of simple sentences (i.e., semantics, syntax, and morphology) and rarely uses complex sentences/messages.   - CK at least 40% < 60% impaired, limited or restricted  Projected status:  FCM:  LEVEL 5: The individual is successfully able to initiate communication using spoken language in structured conversations with both familiar and unfamiliar communication partners.  The individual occasionally requires minimal cueing to frame more complex sentences in messages. The individual occasionally self-cues when encountering difficulty.  - CJ at least 20% < 40% impaired, limited or restricted    Medical necessity is demonstrated by the following IMPAIRMENTS:  Decreased efficiency and effecitveness of communication because of decreased verbal expression limits communication with known and unknown communication partners in social, medical, and emergent situations.   Barriers to Therapy: transportation; wife work schedule; co-pay for multiple disciplines   Pt's spiritual, cultural and educational needs  considered and pt agreeable to plan of care and goals.    Plan:   Continue POC with focus on verbal fluency.    GALI Selby, CCC-SLP   6/7/2019

## 2019-06-07 ENCOUNTER — CLINICAL SUPPORT (OUTPATIENT)
Dept: REHABILITATION | Facility: HOSPITAL | Age: 69
End: 2019-06-07
Attending: INTERNAL MEDICINE
Payer: MEDICARE

## 2019-06-07 DIAGNOSIS — R47.01 BROCA'S APHASIA: ICD-10-CM

## 2019-06-07 PROCEDURE — G9159 LANG COMP CURRENT STATUS: HCPCS | Mod: CJ,PN

## 2019-06-07 PROCEDURE — 92507 TX SP LANG VOICE COMM INDIV: CPT | Mod: PN

## 2019-06-07 PROCEDURE — G9160 LANG COMP GOAL STATUS: HCPCS | Mod: CI,PN

## 2019-06-10 NOTE — PROGRESS NOTES
Occupational Therapy Progress Note/POC     Date: 6/11/2019  Name: Kamar Doshi  Clinic Number: [id number]    Therapy Diagnosis:   Encounter Diagnoses   Name Primary?    Right arm weakness     Coordination impairment     Self-care deficit for feeding      Physician: Bethel Harley MD    Physician Orders: Eval and Treat  Medical Diagnosis: right arm weakness  Evaluation Date: 4/4/2019  Plan of Care Expiration Period:6/11/19 to 8/6/19  Insurance Authorization period Expiration: 7/2/19  Date of Return to MD: not scheduled  Visit # / Visits Authorized:  1/12  (12 total visits)  FOTO: 15th visit     Time In: 10:05   am  Time Out:  11:00 am  Total Billable (one on one) Time:  55 minutes     Precautions: Standard      Subjective     Pt reports:   I am doing okay but my back hurts.   he was not compliant with home exercise program given last session.   Response to previous treatment: no complaints  Functional change: nothing reported    Pain: 0/10 unable to rate pain   Location:  Low back    Objective     Kamar received therapeutic exercises for  55   minutes including:  -Sci fit performed 8 min forward direct with  verbal cues to for directional changes  -table slides with wgt bearing into flexion x 2 min                                                  Into h.abd/adduction x 2 min  -3# dowel supine flexion x 15 reps  -3# dowel supine chest press x 10 reps x 2 sets   -supine right UE 2# chest press x 10 reps  -supine right UE tricep curls 2 # x 2 sets 10 reps , support to arm for curls  -seated right bicep curls 2# forearm neutral x 20 reps                                               Forearm supinated x 20 reps  -1# wrist cuff wgt stacking cups from 3.5' shelf to x 15 reps  -1# wrist cuff wgt placing cups to tops shelf x 8 reps x 2 sets   -matrix rows 25# x 2 sets 10 reps               tricep curls 40# x 2 sets 10 reps    Home Exercises and Education Provided     Education provided:    -importance of using his  right hand     Written Home Exercises Provided: Patient instructed to cont prior HEP.  Exercises were reviewed and Kamar was able to demonstrate them prior to the end of the session.  Kamar demonstrated fair  understanding of the HEP provided.   .   See EMR under Patient Instructions for exercises provided 4/4/19.        Assessment     Pt would continue to benefit from skilled OT.  Kamar sometimes displays confusion with exercises while in clinic. Pt demonstrating overhead reaching with RUE although mild trunk compensation.      Kamar is progressing  towards his goals and there are no updates to goals at this time. Pt prognosis is Good.     Pt will continue to benefit from skilled outpatient occupational therapy to address the deficits listed in the problem list on initial evaluation provide pt/family education and to maximize pt's level of independence in the home and community environment.     Anticipated barriers to occupational therapy: his aphasia    Pt's spiritual, cultural and educational needs considered and pt agreeable to plan of care and goals.    Goals:    Short Term Goals:   Pt will be able to demonstrate his HEP in clinic with 100% accuracy   Pt will increase AROM of RUE x 30* of elevation   Pt will increase right shoulder  strength to 4+/5      Long Term Goals:  Pt will demonstrate elevation of RUE to reach kitchen cabinet to reach for cup with RUE 75% of attempts  Pt will be able to assist in carrying groceries into the house with RUE 2 x week or as needed   Pt will assist wife in folding clothes 1 x week with the use of right hand.     Plan   Pt to be progress with exercise and encouragement to engage in activities at home.     UMA [place]

## 2019-06-11 ENCOUNTER — CLINICAL SUPPORT (OUTPATIENT)
Dept: REHABILITATION | Facility: HOSPITAL | Age: 69
End: 2019-06-11
Attending: INTERNAL MEDICINE
Payer: MEDICARE

## 2019-06-11 DIAGNOSIS — R29.898 RIGHT ARM WEAKNESS: ICD-10-CM

## 2019-06-11 DIAGNOSIS — R63.39 SELF-CARE DEFICIT FOR FEEDING: ICD-10-CM

## 2019-06-11 DIAGNOSIS — R27.8 COORDINATION IMPAIRMENT: ICD-10-CM

## 2019-06-11 PROCEDURE — 97110 THERAPEUTIC EXERCISES: CPT | Mod: PN

## 2019-06-17 ENCOUNTER — CLINICAL SUPPORT (OUTPATIENT)
Dept: REHABILITATION | Facility: HOSPITAL | Age: 69
End: 2019-06-17
Attending: INTERNAL MEDICINE
Payer: MEDICARE

## 2019-06-17 DIAGNOSIS — R47.01 BROCA'S APHASIA: ICD-10-CM

## 2019-06-17 PROCEDURE — 92507 TX SP LANG VOICE COMM INDIV: CPT | Mod: PN

## 2019-06-17 NOTE — PROGRESS NOTES
Outpatient Neurological Rehabilitation   Speech and Language Therapy Daily Note / Progress Note  Date:  6/17/2019     Name: Kamar Doshi   MRN: [id number]   Therapy Diagnosis:   No diagnosis found.Physician: Bethel Harley MD  Physician Orders: ST eval and treat  Medical Diagnosis: Broca's aphasia [R47.01]     Visit # / Visits Authorized:  4 / 12   Date of Evaluation:  5/3/2019   Insurance Authorization Period: 4/03/19 to 6/03/19  Plan of Care Certification:    5/3/2019 to 6/28/19   Extended POC:  n/a   Progress Note: 6/7/19  Visits Cancelled: 1  Visits No Show: 0    Time In:  1432  Time Out:  1517  Total Billable Time: 45 minutes     G-Code 3 / 10   Eval 5/3/19 - comprehension CJ/CI   Update 6/6/19 - Comprehension CJ/CI        Precautions: Standard and Fall    Subjective:   Pt reports: Pt reports he is feeling fine and it is good to wake up.   Response to previous treatment: did not recall   Pain Scale:  0/10 on VAS currently.   Pain Location: n/a  Objective:   UNTIMED  Procedure Min.   Speech- Language- Voice Therapy  45   Total Untimed Units: 1  Charges Billed/# of units: 1    Short Term Goals: (4 weeks) Current Progress:   1. Pt will answer multiunit y/n Questions with 90% acc indly to improve auditory comtprehension.   Progressing/ Not Met 6/17/2019   90% acc indly, 100% acc given a repetition.    2. Pt will identify right-left on body with 90% acc indly to improve comprehension of temporal directions.  Progressing/ Not Met 6/17/2019   Pt identified right and left on body with 80% acc ind'ly, and 100% given Renee, such as single repetition and semantic cue.   3. Pt will follow 3 unit commands with 90% acc indly to improve auditory comprehension.  Progressing/ Not Met 6/17/2019   Not formally addressed.    4. Pt will label items in pictures with 90%acc indly to improve confrontational naming.   Progressing/ Not Met 6/17/2019   Not formally addressed.    5. Pt will complete word finding task (i.e. Creating  subject, verb, object pairs in VNEST protocol) with 90% acc min A to improve word fluency.  Progressing/ Not Met 6/17/2019   40% acc ind'ly, 100% acc given modA, such as semantic cues and increased time for processing.    6. Pt will participate in Verb Network Strength Training for 2-4 verbs per session with min A to improve word fluency.   Progressing/ Not Met 6/17/2019   Verb Network Strength Training (VNEST) was introduced today. VNEST focuses on verbs, encouraging people to think of people who perform actions (agents) and the objects or the actions that are performed on (patients). The idea is that by focusing on verbs, which require connection to nouns, you can strengthen all the words in the mental network around the verb. In this therapy technique, there are 6 steps each verb is taken through.   Step 1: Pt provided 2/3  subjects and objects in verb triads IND'ly, 3/3  given cues.   Step 2: Pt read triads of words aloud with 100% acc ind'ly .  Step 3: pt expanded where with 0% acc indly, 100% acc given semantic cues, when with 0% acc, 100% acc given semantic cues.   Step 4: pt identified whether sentences presented auditorily were correct or incorrect with 80% acc IND'ly.   Step 5: pt recalled the target verb with 100% acc IND'ly.  Step 6: not addressed         7. Pt will participate in an assessment of reading and writing skills.   Progressing/ Not Met 6/17/2019   Not formally addressed      8. New goal; pt will complete convergent naming tasks with 90% acc indly to improve verbal expression.   Progressing/ Not Met 6/17/2019    Not formally addressed.       Patient Education/Response:   Purpose of each task reviewed. Strategies to increase auditory comprehension, such as repetition, reviewed. Discussed therapy tasks and generalization to the home environment with pt's wife. Pt and wife verbalized understanding.    Assessment:   Kamar is progressing well towards his goals. Kamar remains motivated to improve.  Pt demonstrated decreased awareness of severity of deficits.  Continued education re: aphasia and his specific deficits necessary and reasons for different therapy tasks. Pt exhibits increased auditory comprehension after he verbally repeats the therapist's question. Pt required multiple explanations in order to complete VNEST. He continues to exhibit decreased processing, indicated by need for extra processing time.  Current goals remain appropriate. Goals to be updated as necessary.   Pt prognosis is Fair. Pt will continue to benefit from skilled outpatient speech and language therapy to address the deficits listed in the problem list on initial evaluation, provide pt/family education and to maximize pt's level of independence in the home and community environment.       Medical necessity is demonstrated by the following IMPAIRMENTS:  Decreased efficiency and effecitveness of communication because of decreased verbal expression limits communication with known and unknown communication partners in social, medical, and emergent situations.   Barriers to Therapy: transportation; wife work schedule; co-pay for multiple disciplines   Pt's spiritual, cultural and educational needs considered and pt agreeable to plan of care and goals.    Plan:   Continue POC with focus on verbal fluency.    Elda Jones CCC-SLP   6/17/2019

## 2019-06-18 NOTE — PROGRESS NOTES
"  Physical Therapy Daily Treatment Note     Name: Kamar Doshi  Clinic Number: [id number]    Therapy Diagnosis:   Encounter Diagnoses   Name Primary?    Right arm weakness Yes    Coordination impairment     Poor balance     Right leg weakness      Physician: Bethel Harley MD    Visit Date: 6/19/2019    Physician Orders: PT Eval   Medical Diagnosis from Referral: Cerebrovascular accident (CVA) due to embolism of left middle cerebral artery and gait difficulty  Evaluation Date: 5/21/2019  Authorization Period Expiration: 4/08/2020  Plan of Care Expiration: 8/21/2019  Visit # / Visits authorized: 2/ 12    Time In: 1033  Time Out: 1115  Total Billable Time: 42 minutes    Precautions: Standard, Fall and CVA    Subjective     Pt reports: He thought he was seeing OT today. Pt confused about his schedule but with no complaints once schedule is figured out.    He was compliant with home exercise program.  Response to previous treatment: none to report  Functional change: none to report    Pain: 0/10  Location: n/a    Objective     BOLD = performed today    Kamar received therapeutic exercises to develop strength, endurance, posture and core stabilization for 15 minutes including:  +SciFit StepOne, 8 minutes, level 1  standing hip abduction RTB 3x15  standing hip extension RTB 3x15  standing heel raises, 3x15   +step up onto 6" step, 2x10 ea LE lead, during first set pt uses B UE for balance, during second set no UE use    Kamar participated in neuromuscular re-education activities to improve: Balance, Coordination, Proprioception and Posture for 27 minutes. The following activities were included:  // bars:    Tandem stance, B LE lead, 3 x 30 seconds, CGA-Renee and occasional UE touch assist   Feet together, eyes closed, 2x 30 seconds, supervision    On airex, feet together, eyes open, 3x 30 seconds, CGA   On airex, feet together, head turns L/R, 3x30 seconds, CGA   On airex, feet together, head turns up and down, " 3x30 seconds, CGA     Home Exercises Provided and Patient Education Provided     Education provided:   - cont with current HEP  - importance of balance activities    Written Home Exercises Provided: Patient instructed to cont prior HEP.  Exercises were reviewed and Kamar was able to demonstrate them prior to the end of the session.  Kamar demonstrated good  understanding of the education provided.     See EMR under Patient Instructions for exercises provided during initial evaluation.    Assessment     Mr. Galvin did well during first session following initial evaluation. He struggled with some attention and aphasia requiring multiple cues to follow direction. He tolerated balance activities well and required explanation as to why they are difficult for him. He continues to do well with therex and tolerated additions well. He remains appropriate for skilled PT services.     Kamar is progressing well towards his goals.   Pt prognosis is Good.     Pt will continue to benefit from skilled outpatient physical therapy to address the deficits listed in the problem list box on initial evaluation, provide pt/family education and to maximize pt's level of independence in the home and community environment.     Pt's spiritual, cultural and educational needs considered and pt agreeable to plan of care and goals.     Anticipated barriers to physical therapy: none    Goals:  Short Term Goals to be achieved in 4 weeks:   1. Patient will demonstrate safety with ambulation tasks.   2. Patient will demonstrate independence with HEP.    3. Patient will increase ambulation distance to 300' with no incidence of R LE toe drag.     Long Term Goals to be achieved in 8 weeks:   1. Pt will improve SLS time to 10 seconds on B LE with supervision in order to increase step length in gait.  2. Pt will increase score on BBS to 49 in order to decrease risk for fall.   3. Pt will decrease score on TUG to 8 seconds to better align him with  normative for his age and gender.    Plan     Continue with currently established POC.Progress balance and exercise as tolerated.    Harrison Vicente, PT

## 2019-06-19 ENCOUNTER — CLINICAL SUPPORT (OUTPATIENT)
Dept: REHABILITATION | Facility: HOSPITAL | Age: 69
End: 2019-06-19
Attending: INTERNAL MEDICINE
Payer: MEDICARE

## 2019-06-19 DIAGNOSIS — R27.8 COORDINATION IMPAIRMENT: ICD-10-CM

## 2019-06-19 DIAGNOSIS — R29.898 RIGHT ARM WEAKNESS: Primary | ICD-10-CM

## 2019-06-19 DIAGNOSIS — R29.898 RIGHT LEG WEAKNESS: ICD-10-CM

## 2019-06-19 DIAGNOSIS — R26.89 POOR BALANCE: ICD-10-CM

## 2019-06-19 PROCEDURE — 97112 NEUROMUSCULAR REEDUCATION: CPT | Mod: PN

## 2019-06-19 PROCEDURE — 97110 THERAPEUTIC EXERCISES: CPT | Mod: PN

## 2019-06-21 ENCOUNTER — DOCUMENTATION ONLY (OUTPATIENT)
Dept: REHABILITATION | Facility: HOSPITAL | Age: 69
End: 2019-06-21

## 2019-06-21 DIAGNOSIS — R47.01 BROCA'S APHASIA: ICD-10-CM

## 2019-06-21 NOTE — PROGRESS NOTES
No Show Note/Documentation    Patient: Kamar Doshi  Date of Session: 6/21/2019  Diagnosis:   1. Broca's aphasia       MRN: [id number]    Kamar Doshi did not attend his/her scheduled therapy appointment today. He did not call to cancel nor reschedule. This is the 1st appointment that he  has not attended. Next appointment is scheduled for 6/28/19 and will follow up with patient at that time. No charges have been posted today.     GAIL Bloom, CF-SLP  Speech-Language Pathologist   06/21/2019

## 2019-06-24 ENCOUNTER — CLINICAL SUPPORT (OUTPATIENT)
Dept: REHABILITATION | Facility: HOSPITAL | Age: 69
End: 2019-06-24
Attending: INTERNAL MEDICINE
Payer: MEDICARE

## 2019-06-24 DIAGNOSIS — R27.8 COORDINATION IMPAIRMENT: Primary | ICD-10-CM

## 2019-06-24 DIAGNOSIS — R26.89 POOR BALANCE: ICD-10-CM

## 2019-06-24 DIAGNOSIS — R29.898 RIGHT LEG WEAKNESS: ICD-10-CM

## 2019-06-24 PROCEDURE — 97112 NEUROMUSCULAR REEDUCATION: CPT | Mod: PN

## 2019-06-24 PROCEDURE — 97110 THERAPEUTIC EXERCISES: CPT | Mod: PN

## 2019-06-24 NOTE — PLAN OF CARE
"  Physical Therapy Progress Note     [facility]  Cl[facility] Number: [id number]    Therapy Diagnosis:   Encounter Diagnoses   Name Primary?    Coordination impairment Yes    Poor balance     Right leg weakness      Physician: Bethel Harley MD    Visit Date: 6/24/2019    Physician Orders: PT Eval   Medical Diagnosis from Referral: Cerebrovascular accident (CVA) due to embolism of left middle cerebral artery and gait difficulty  Evaluation Date: 5/21/2019  Last PN: 6/24/2019  Authorization Period Expiration: 4/08/2020  Plan of Care Expiration: 8/21/2019  Visit # / Visits authorized: 3/ 12    Time In: 1030  Time Out: 1113  Total Billable Time: 43 minutes    Precautions: Standard, Fall and CVA    Subjective     Pt reports: He is doing well today. Pt struggles to recall events from the weekend.    He was compliant with home exercise program.  Response to previous treatment: none to report  Functional change: none to report    Pain: 0/10  Location: n/a    Objective     BOLD = performed today    Kamar received therapeutic exercises to develop strength, endurance, posture and core stabilization for 15 minutes including:  SciFit upright bike, 8 minutes, level 1  standing hip abduction RTB 3x15  standing hip extension RTB 3x15  standing heel raises, 3x15   step up onto 6" step, 2x10 ea LE lead, during first set pt uses B UE for balance, during second set no UE use  +str L hamstrings on step, 2x 30 sec    Kamar participated in neuromuscular re-education activities to improve: Balance, Coordination, Proprioception and Posture for 28 minutes. The following activities were included:  // bars:    Tandem stance, B LE lead, 3 x 30 seconds, CGA-Renee and occasional UE touch assist   Feet together, eyes closed, 2x 30 seconds, supervision    On airex, feet together, eyes open, 3x 30 seconds, CGA   On airex, feet together, head turns L/R, 3x30 seconds, CGA   On airex, feet together, head turns up and down, 3x30 seconds, " "CGA   Side stepping over yogi x6 ea direction, SBA  x10 sit to stands with no UE support from 18" box, SBA     Evaluation (2019) 2019   30 second sit to stand 6 without UE use 8 with UE support on thighs   TUG (w/ no AD) 12.4 seconds 14.5 +14.3 + 12.4 = 13.7 seconds   [place]ster 44/56 50/56   DGI NT    SLS R: <2 seconds L: 5 seconds R: 30 seconds L: 20 seconds     BREWSTER Assessment  1. Sitting to Standin - able to stand without using hands and stabilize independently  2. Standing Unsupported: 4 - able to stand safely 2 minutes without hold  3. Sitting Unsupported: 4 - able to sit safely and securely 2 minutes  4. Standing to Sittin - sits safely with minimal use of hands  5. Pivot Transfer: 4 - able to trnasfer safely with minor use of hands  6. Standing with Eyes Closed: 4 - albe to stand 10 seconds safely  7. Standing with Feet Together: 4 - able to place feet together independently and stand 1 minute safely  8. Reaching Forward with Outstretched Arm: 3 - can reach forward 12 cm/5 inches safely  9. Retrieving Object from Floor: 3 - able to pick slipper but needs supervision  10. Turning to Look Behind: 3 - looks behind one side only, other side less weight shift  11. Turning 360 Degrees: 2 - able to turn 360 safely but slowly  12. Placing Alternate Foot on Step: 4 - able to stand independently safely and complete 8 steps in 20 seconds  13. Standing with One Foot in Front: 3 - able to place foot ahead of other independently and hold 30 seconds  14. Standing on One Foot: 4 - able to lift leg independently and hold > 10 seconds  Total: 50  Maximum: 56  (50 fall risk)    0-20= high fall risk  21-40= moderate fall risk  41-56= low fall risk    Fall risk cut-off scores:   Elderly and History of falls: <51/56   Elderly and No history of falls: <42/56   CVA: <45/56       DYNAMIC GAIT INDEX:    1. Gait level surface= 3   3) Normal: walks 20', no AD, good speed, no evidence for imbalance,   normal gait " "pattern   2) Mild impairment: walks 20', uses AD, slower speed, mild gait deviations   1)  moderate impairment: walks 20', slow speed, abnormal gait pattern,   evidence for imbalance.   0)  severe impairment: cannot walk 20' without assistance, severe gait   deviations or imbalance  2. Change in gait speed: 3   3) Normal: able to smoothly change walking speed without loss of balance   or gait deviation. Shows a significant difference in walking speeds    between fast, slow, and normal speeds.    2) Mild impairment: is able to change speeds but demonstrates mild gait   deviations, or not gait deviations but unable to achieve a significant   change in velocity, or uses AD   1) Moderate impairment: makes only minor adjustments in walking speed,   or accomplishes a change in speed with significant gait deviation, or   changes speed but loses balance and is able to recover and keep    walking    0) Severe impairment: cannot change speeds, or loses balance and has to   reach for wall or be caught.  3. Gait with horizontal head turns: 2   3) Normal: performs head turns smoothly with no change in gait.   2) Mild impairment: performs head turns smoothly with slight velocity, I.e.   Minor disruption to smooth gait path or uses AD   1) Moderate impairment: performs head turns with moderate change in gait   velocity, slows down, staggers but recovers, can continue to walk   0) Severe impairment: performs task with severe disruption of gait, i.e.   Staggers outside of 15" path, loses balance , stops, reaches for wall  4. Gait with vertical head turns: 2   3) Normal: performs head turns smoothly with no change in gait   2) Mild impairment: performs head turns smoothly with slight velocity, I.e.   Minor disruption to smooth gait path or uses AD   1) Moderate impairment: performs head turns with moderate change in gait   velocity, slows down, staggers but recovers, can continue to walk   0) Severe impairment: performs task with " "severe disruption of gait, i.e.   Staggers outside of 15" path, loses balance , stops, reaches for wall  5. Gait and pivot turn: 2   3)  Normal: pivot turns safely within 3 sec and stops quickly with no LOB   2)  Mild impairment: pivot turns safely in > 3 sec and stops with no LOB   1) Moderate impairment: turns slowly, requires verbal cueing, requires   several small steps to catch balance following turn and stop   0)  Severe impairment: cannot turn safely, requires assistance to stop and   Turn  6. Step over obstacle:  2   3) normal: is able to step over box without changing speed, no LOB   2)  Mild impairment: is able to step over box, but must slow down and adjust   steps to clear box safely   1)  Moderate impairment: is able to step over box but must stop, then step   over. May require verbal cueing   0) Severe impairment: cannot perform without assistance  7. Step around obstacle: 2   3) Normal: able to walk around cones safely without changing gait speed;   no LOB   2)  Mild impairment: able to step around cones, but must slow down to adjust   steps to clear cones   1) moderate impairment: able to clear cones but must significantly slow   speed to accomplish or requires verbal cueing   0)  Severe impairment: unable to clear cones, walks into 1 or both cones, or   requires physical assistance  8. Steps: 3   3) Normal: alternating feet, no rail   2) Mild impairment: alternating feet, must use rail   1) Moderate impairment: 2 feet a stair, must use rail   0)  Severe impairment: cannot perform safely  Total: 19/24         Home Exercises Provided and Patient Education Provided     Education provided:   - cont with current HEP  - importance of balance activities    Written Home Exercises Provided: Patient instructed to cont prior HEP.  Exercises were reviewed and Kamar was able to demonstrate them prior to the end of the session.  Kamar demonstrated good  understanding of the education provided.     See EMR under " Patient Instructions for exercises provided during initial evaluation.    Assessment     Mr. Doshi has already made some progress in skilled PT services despite his minimal attendance since evaluations due to monetary restrictions. Pt reports he exercises daily at home and added balance activities to his routine due to coming to PT. He continues to present as a fall risk for high level activities as indicated by DGI, and he has improved his Richter score and no longer presents as a fall risk by its metrics. Although pt does not show improvement with TUG score, pt remains stable in his TUG score. PT continues to believe that pt's aphasia is the biggest barrier for progress in PT, as pt often struggles to understand what is being asked of him. Pt remains motivated, goals have been adjusted, and pt will continue to benefit from skilled PT services for the remainder of POCBarbara Galvin is progressing well towards his goals.   Pt prognosis is Good.     Pt will continue to benefit from skilled outpatient physical therapy to address the deficits listed in the problem list box on initial evaluation, provide pt/family education and to maximize pt's level of independence in the home and community environment.     Pt's spiritual, cultural and educational needs considered and pt agreeable to plan of care and goals.     Anticipated barriers to physical therapy: none    Goals:  Short Term Goals to be achieved in 4 weeks:   1. Patient will demonstrate safety with ambulation tasks. MET  2. Patient will demonstrate independence with HEP.  MET  3. Patient will increase ambulation distance to 300' with no incidence of R LE toe drag. progressing     Long Term Goals to be achieved in 8 weeks:   1. Pt will improve SLS time to 10 seconds on B LE with supervision in order to increase step length in gait. MET  2. Pt will increase score on BBS to 49 in order to decrease risk for fall. MET  3. Pt will decrease score on TUG to 8 seconds to  better align him with normative for his age and gender. Progressing  4. New Goal 6/24/2019: Pt will increase DGI score to 21/24 in order to decrease his risk for fall.    Plan     Continue with currently established POC. Progress balance to ambulatory balance tasks in order to continue to decrease his risk for fall.    Harrison Vicente, PT

## 2019-06-28 ENCOUNTER — CLINICAL SUPPORT (OUTPATIENT)
Dept: REHABILITATION | Facility: HOSPITAL | Age: 69
End: 2019-06-28
Attending: INTERNAL MEDICINE
Payer: MEDICARE

## 2019-06-28 DIAGNOSIS — R47.01 BROCA'S APHASIA: ICD-10-CM

## 2019-06-28 PROCEDURE — G9159 LANG COMP CURRENT STATUS: HCPCS | Mod: CJ,PN

## 2019-06-28 PROCEDURE — 92507 TX SP LANG VOICE COMM INDIV: CPT | Mod: PN

## 2019-06-28 PROCEDURE — G9160 LANG COMP GOAL STATUS: HCPCS | Mod: CI,PN

## 2019-06-28 NOTE — PROGRESS NOTES
"Outpatient Neurological Rehabilitation   Speech and Language Therapy Daily Note  Date:  6/28/2019     Name: Kamar Doshi   MRN: [id number]   Therapy Diagnosis:   Encounter Diagnosis   Name Primary?    Broca's aphasia    Physician: Bethel Harley MD  Physician Orders: ST eval and treat  Medical Diagnosis: Broca's aphasia [R47.01]     Visit # / Visits Authorized:  5 / 12   Date of Evaluation:  5/3/2019   Insurance Authorization Period: 4/03/19 to 6/03/19  Plan of Care Certification:    5/3/2019 to 6/28/19   Extended POC:  n/a   Progress Note: 6/7/19  Visits Cancelled: 1  Visits No Show: 1    Time In:  0950   Time Out:  1030   Total Billable Time: 40 minutes     G-Code 4 / 10   Eval 5/3/19 - comprehension CJ/CI   Update 6/6/19 - Comprehension CJ/CI        Precautions: Standard and Fall    Subjective:   Pt reports: "stay at home" when asked  What he did this week. Expanded to "piddle around my social security" but unable to describe what that is.      Response to previous treatment: did not recall   Pain Scale:  0/10 on VAS currently.   Pain Location: n/a  Objective:   UNTIMED  Procedure Min.   Speech- Language- Voice Therapy  40    Total Untimed Units: 1  Charges Billed/# of units: 1    Short Term Goals: (4 weeks) Current Progress:   1. Pt will answer multiunit y/n Questions with 90% acc indly to improve auditory comtprehension.   Progressing/ Not Met 6/28/2019   65 % acc indly, 80% acc given a repetition.    2. Pt will identify right-left on body with 90% acc indly to improve comprehension of temporal directions.  Progressing/ Not Met 6/28/2019   Not formally addressed     3. Pt will follow 3 unit commands with 90% acc indly to improve auditory comprehension.  Progressing/ Not Met 6/28/2019   Pt followed 3 unit commands with 70% acc indly, 80% acc given moderate verbal cues and a repetition and model.  Particular difficulty with commansd that included "and" such as "touch your knee and your elbow"    4. Pt will " label items in pictures with 90%acc indly to improve confrontational naming.   Progressing/ Not Met 6/28/2019   59% acc indly, 77% acc given a semantic cue, 91% acc given a semantic cue and a phonemic cue   5. Pt will complete word finding task (i.e. Creating subject, verb, object pairs in VNEST protocol) with 90% acc min A to improve word fluency.  Progressing/ Not Met 6/28/2019   Not formally addressed     6. Pt will participate in Verb Network Strength Training for 2-4 verbs per session with min A to improve word fluency.   Progressing/ Not Met 6/28/2019   Not formally addressed     7. Pt will participate in an assessment of reading and writing skills.   Progressing/ Not Met 6/28/2019   Not formally addressed      8.  pt will complete convergent naming tasks with 90% acc indly to improve verbal expression.   Progressing/ Not Met 6/28/2019    43% acc indly,  71% acc given a repetition and a verbal cue       Patient Education/Response:   Purpose of each task reviewed. Increased difficulty with comprehension with continued poor understanding of deficits. Pt continues to ask SLP if she really said the whole question the first time. SLP explained comprehension deficits in detail multiple times.   Assessment:   Kamar is progressing well towards his goals. Continued poor comprehension of all stimuli with more than two units of information with poor awareness of deficits .  Current goals remain appropriate. Goals to be updated as necessary.   Pt prognosis is Fair. Pt will continue to benefit from skilled outpatient speech and language therapy to address the deficits listed in the problem list on initial evaluation, provide pt/family education and to maximize pt's level of independence in the home and community environment.       Medical necessity is demonstrated by the following IMPAIRMENTS:  Decreased efficiency and effecitveness of communication because of decreased verbal expression limits communication with known  and unknown communication partners in social, medical, and emergent situations.   Barriers to Therapy: transportation; wife work schedule; co-pay for multiple disciplines   Pt's spiritual, cultural and educational needs considered and pt agreeable to plan of care and goals.    Plan:   Continue POC with focus on verbal fluency.    GAIL Selby, CCC-SLP   6/28/2019

## 2019-07-02 ENCOUNTER — CLINICAL SUPPORT (OUTPATIENT)
Dept: REHABILITATION | Facility: HOSPITAL | Age: 69
End: 2019-07-02
Attending: INTERNAL MEDICINE
Payer: MEDICARE

## 2019-07-02 DIAGNOSIS — R27.8 COORDINATION IMPAIRMENT: ICD-10-CM

## 2019-07-02 DIAGNOSIS — R29.898 RIGHT ARM WEAKNESS: ICD-10-CM

## 2019-07-02 DIAGNOSIS — R63.39 SELF-CARE DEFICIT FOR FEEDING: ICD-10-CM

## 2019-07-02 PROCEDURE — 97110 THERAPEUTIC EXERCISES: CPT | Mod: PN

## 2019-07-02 NOTE — PROGRESS NOTES
Occupational Therapy Progress Note     Date: 7/2/2019  Name: Kamar Doshi  Clinic Number: [id number]    Therapy Diagnosis:   Encounter Diagnoses   Name Primary?    Right arm weakness     Coordination impairment     Self-care deficit for feeding      Physician: Roberta King NP    Physician Orders: Eval and Treat  Medical Diagnosis: right arm weakness  Evaluation Date: 4/4/2019  Plan of Care Expiration Period:6/11/19 to 8/6/19  Insurance Authorization period Expiration:7/2/19  Date of Return to MD: not scheduled  Visit # / Visits Authorized: 2/12  (14 total visits)  FOTO: 15th visit     Time In: 10:00 am  Time Out:  11:00 am  Total Billable (one on one) Time:  50 minutes     Precautions: Standard      Subjective     Pt reports:   It seems like I can move and use my arm but then it starts to act up after a little use.  he was not compliant with home exercise program given last session.   Response to previous treatment: no complaints  Functional change: unable to state any changes    Pain: 0/10 no pain reported   Location:  Right shoulder    Objective     Kamar received therapeutic exercises for 50  minutes including:  -Sci fit performed 8 min forward direct with  verbal cues to for directional changes      Joint Evaluation  AROM  4/4/2019 AROM  4/4/2019  AROM 5/29/19 AROM  7/2/19     Left Right Right right   Shoulder flex 0-180 120 80    100    100   Shoulder Abd 0-180 110 70    70    90   Shoulder ER 0-90 30 30 sb    55    60 sb   Shoulder IR 0-90 To low back 45  To low back  to low back   Shoulder Extension 0-80 45 40    50    50   Shoulder Horizontal adduction 0-90 neutral neutral        Elbow flex/ext 0-150 WFL/WFL WFL/-15  WFL/-10   Wfl/-10   Wrist flex 0-80 WFL WFL      Wrist ext 0-70 WFL WFL      Supination 0-80 WFL WFL      Pronation 0-80 WFL WFL         Fist: right  tight   right hand 90% of fist, opposes thumb to all tips   left hand full     Strength 4/4/2019 4/4/2019 5/29/10  7/2/19    **within available ROM** Left Right   Right   Right   Shoulder flex 5/5 3/5   4/5   4+/5   Shoulder abd 4/5 3/5   4-/5   4-/5   Shoulder ER 4/5 3/5   3+/5   3+/5   Shoulder IR 5/5 4/5   3+/5   4-/5   Shoulder Extension 5/5       Shoulder Horizontal adduction5 5/5       Elbow flex 5/5 5/5     Elbow ext 5/5 5/5     Wrist flex 5/5 5/5     Wrist ext 5/5 5/5     Supination 5/5 4+/5   4/5    4/5   Pronation 5/5 4-/5   4-/5    4/5       Strength: (PAIGE Dynamometer in lbs.) Average 3 trials, Position II:       4/4/2019 4/4/2019 5/29/19 7/2/19     Left Right Right  right    Rung II 45.1# 19.4# 26.9# 32.6#      Pinch Strength (Measured in psi)       4/4/2019 4/4/2019 5/29/19 7/2/19     Left Right Right  Right    Zheng Pinch 20 psi 13 psi 13 psi 13.6 psi   3pt Pinch 16 psi 8 psi 8.3 psi 9.3  psi                 Fine Motor Coordination: 9 Hole Peg Test  Left 4/4/2019 Right 4/4/2019 right 4/30/19 Right   5/29/19 Right   7/2/19    21 seconds no drops 1 min 5 seconds no drops  45 seconds no drops 51 seconds  No drops  52 seconds     Supine 3# dowel flexion x 15 reps  Supine RUE chest press 3# x 2 sets 10 reps  Supine  Red theraband  H.abduction x 2 sets 10 reps - repeated instructions for understanding/manual cues  Supine red theraband pull downs x 2 sets 10 reps.      CMS Impairment/Limitation/Restriction for FOTO neuro Survey     Therapist reviewed FOTO scores for Kamar Doshi on 5/29/2019.   FOTO documents entered into [employer] - see Media section.     Limitation Score:   Category: Self Care     Current : CL = least 60% but < 80% impaired, limited or restricted 55%(improved 8%)  Goal: CJ = at least 20% but < 40% impaired, limited or restricted 40%  Discharge:            Home Exercises and Education Provided     Education provided:    -encouraged Kamar to be active at home.    Written Home Exercises Provided: Patient instructed to cont prior HEP.  Exercises were reviewed and Kamar was able to demonstrate them prior to  the end of the session.  Kamar demonstrated fair  understanding of the HEP provided.   .   See EMR under Patient Instructions for exercises provided 4/4/19.        Assessment     Pt would continue to benefit from skilled OT.  Pt is demonstrating  improvements in AROM and strength of right arm.  Pt with fair endurance of RUE with exercise.  A high level of verbal cues required to keep quality motion during ex.   Pt is not engaged in home tasks.      Kamar is progressing  towards his goals and there are no updates to goals at this time. Pt prognosis is Good.     Pt will continue to benefit from skilled outpatient occupational therapy to address the deficits listed in the problem list on initial evaluation provide pt/family education and to maximize pt's level of independence in the home and community environment.     Anticipated barriers to occupational therapy: his aphasia    Pt's spiritual, cultural and educational needs considered and pt agreeable to plan of care and goals.     Modified Goals:  Short Term Goals:   Pt will be able to demonstrate his HEP in clinic with 100% accuracy- not met   Pt will increase AROM of RUE x 30* of elevation - progressing   Pt will increase right shoulder  strength to 4+/5 - progressing     Long Term Goals:  Pt will demonstrate elevation of RUE to reach kitchen cabinet to reach for cup with RUE 75% of attempts  Pt will be able to assist in carrying groceries into the house with RUE 2 x week or as needed   Pt will assist wife in folding clothes 1 x week with the use of right hand.     Plan   Certification Period/Plan of care expiration: 6/11/19 to 8/6/19  Pt to attend OT every other week secondary to all three disciplines require therapy which creates a financial burden.      UMA [place]

## 2019-07-16 ENCOUNTER — DOCUMENTATION ONLY (OUTPATIENT)
Dept: REHABILITATION | Facility: HOSPITAL | Age: 69
End: 2019-07-16

## 2019-07-16 DIAGNOSIS — R29.898 RIGHT ARM WEAKNESS: ICD-10-CM

## 2019-07-16 DIAGNOSIS — R63.39 SELF-CARE DEFICIT FOR FEEDING: ICD-10-CM

## 2019-07-16 DIAGNOSIS — R27.8 COORDINATION IMPAIRMENT: ICD-10-CM

## 2019-07-16 NOTE — PROGRESS NOTES
Kamar did not arrive for his occupational therapy appointment today.  This therapist attempted to contact him to offer a later appointment, neither phone numbers were answered and not able to leave message.  Support staff attempted at a later time to reach patient again same results.

## 2019-07-17 NOTE — PLAN OF CARE
Outpatient Neurological Rehabilitation Therapy  Updated POC     Date: 6/28/2019   Name: Kamar Doshi  Clinic Number: [id number]    Therapy Diagnosis:   Encounter Diagnosis   Name Primary?    Broca's aphasia      Physician: Bethel Harley MD    Physician Orders: ST eval and treat  Medical Diagnosis: Broca's aphasia [R47.01]     Visit # / Visits Authorized:  5 / 12   Date of Evaluation:  5/3/2019   Insurance Authorization Period: 4/03/19 to 6/03/19  Plan of Care Certification:    5/3/2019 to 6/28/19   Extended POC:  n/a   Progress Note: 6/7/19  Visits Cancelled: 3   Visits No Show:  2    Precautions:Standard     Subjective     Update: patient motivation to continue therapy.     Objective     Update: see follow up note dated 6/28/2019    Assessment     Update: Kamar Doshi presents to [facility] s/p medical diagnosis of  CVA. Demonstrates impairments including limitations as described in the problem list. Positive prognostic factors include patient motivation. Negative prognostic factors include time post onset. He presents with broca's aphasia c/b slow, halting speech, decreased word fluency with bursts of more fluent speech, decreased comprehension at multiunit level for a multitude of stimuli. Pt inconsistently attends therapy because of his high copay and wife's work schedule. However, he demonstrates improvements at each session he attends. Barriers to therapy include transportation and high copay. Patient will benefit from skilled, outpatient neurological rehabilitation speech therapy.    JENNIFER NOMS (National Outcome Measure System):  Spoken Language Comprehension  Current status: FCM:  LEVEL 5: The individual is able to understand communication in structured conversations with both familiar and unfamiliar communication partners. The individual occasionally requires minimal cueing to understand more complex sentences/messages. The individual occasionally initiates the use  of compensatory strategies when encountering difficulty. - CJ at least 20% < 40% impaired, limited or restricted  Projected status:  FCM: LEVEL 6: The individual is able to understand communication in most activities, but some limitations in comprehension are still apparent in vocational, avocational, and social activities. The individual rarely requires minimal cueing to understand complex sentences. The individual usually uses compensatory strategies when encountering difficulty.  -  CI at least 1% but less than 20% impaired, limited or restricted     Rehab Potential: fair     Education: Purpose of each task reviewed. Increased difficulty with comprehension with continued poor understanding of deficits. Pt continues to ask SLP if she really said the whole question the first time. SLP explained comprehension deficits in detail multiple times.     Previous Short Term Goals Status: 4 weeks  Short Term Goals: (4 weeks) Current Progress:   1. Pt will answer multiunit y/n Questions with 90% acc indly to improve auditory comtprehension.   Goal Not Met / Continue    2. Pt will identify right-left on body with 90% acc indly to improve comprehension of temporal directions. Goal Not Met / Continue    3. Pt will follow 3 unit commands with 90% acc indly to improve auditory comprehension. Goal Not Met / Continue    4. Pt will label items in pictures with 90%acc indly to improve confrontational naming.  Goal Not Met / Continue    5. Pt will complete word finding task (i.e. Creating subject, verb, object pairs in VNEST protocol) with 90% acc min A to improve word fluency. Goal Not Met / Continue    6. Pt will participate in Verb Network Strength Training for 2-4 verbs per session with min A to improve word fluency.  Goal Not Met / Continue    7. Pt will participate in an assessment of reading and writing skills.  Goal Not Met / Continue    8.  pt will complete convergent naming tasks with 90% acc indly to improve  verbal expression.  Goal Not Met / Continue         New Short Term Goals: 4 weeks  Short Term Goals: (4 weeks) Current Progress:   1. Pt will answer multiunit y/n Questions with 90% acc indly to improve auditory comtprehension.   Goal Not Met / Continue    2. Pt will identify right-left on body with 90% acc indly to improve comprehension of temporal directions. Goal Not Met / Continue    3. Pt will follow 3 unit commands with 90% acc indly to improve auditory comprehension. Goal Not Met / Continue    4. Pt will label items in pictures with 90%acc indly to improve confrontational naming.  Goal Not Met / Continue    5. Pt will complete word finding task (i.e. Creating subject, verb, object pairs in VNEST protocol) with 90% acc min A to improve word fluency. Goal Not Met / Continue    6. Pt will participate in Verb Network Strength Training for 2-4 verbs per session with min A to improve word fluency.  Goal Not Met / Continue    7. Pt will participate in an assessment of reading and writing skills.  Goal Not Met / Continue    8.  pt will complete convergent naming tasks with 90% acc indly to improve verbal expression.  Goal Not Met / Continue         Long Term Goal Status:  8 weeks  1. Pt will comprehend communication related to basic medical and social needs and utilize compensatory strategies to maintain safety and participate socially in functional living environment.   Goal Not Met / Continue   2. Pt will develop functional cognitive-linguistic based skills and utilize compensatory strategies to communicate wants and needs effectively to different conversational partners, maintain safety and participate socially in functional living environment.   Goal Not Met / Continue   3. Pt will develop functional reading skills and utilize compensatory strategies to maintain safety during ADL's and read and understand everyday adult material independently. Goal Not Met / Continue   4. Pt will develop functional writing skills  related to routine daily activities and utilize compensatory strategies to communicate basic medical wants and needs in functional environment. Goal Not Met / Continue     Reasons for Recertification of Therapy: pt has inconsistently attended therapy but has made improvements with each session that he has attended. Will continue to improve efficiency and effectiveness of communication for the purpose of communicating with family, friends, and medical professionals with continued skilled intervention.     Plan     Updated Certification Period: 6/28/2019 to 8/23/19  Recommended Treatment Plan: Patient will participate in the [facility] program for speech therapy 2 times per week to address his  Communication deficits, to educate patient and their family, and to participate in a home exercise program.     Other recommendations: none at this time      Therapist's Name:  GAIL Selby, CCC-SLP   6/28/2019      I CERTIFY THE NEED FOR THESE SERVICES FURNISHED UNDER THIS PLAN OF TREATMENT AND WHILE UNDER MY CARE    Physician's comments:     Physician's Name:

## 2019-07-18 NOTE — PROGRESS NOTES
"Outpatient Neurological Rehabilitation   Speech and Language Therapy Daily Note  Date:  7/19/2019     Name: Kamar Doshi   MRN: [id number]   Therapy Diagnosis:   Encounter Diagnosis   Name Primary?    Broca's aphasia    Physician: Bethel Harley MD  Physician Orders: ST eval and treat  Medical Diagnosis: Broca's aphasia [R47.01]     Visit # / Visits Authorized:  1 / 5 (6 previously)  Date of Evaluation:  5/3/2019   Insurance Authorization Period: 7/5/19 to 7/4/20   Plan of Care Certification:    5/3/2019 to 6/28/19   Extended POC:  6/28/2019 to 8/23/19  Progress Note: 6/7/19  Visits Cancelled: 1  Visits No Show: 1    Time In:  0950   Time Out:  1030   Total Billable Time: 40 minutes     G-Code 1 / 10   Eval 5/3/19 - comprehension CJ/CI   Update 6/6/19 - Comprehension CJ/CI    Updated  6/28/19 - comprehension CJ/CI     Precautions: Standard and Fall    Subjective:   Pt reports: "that's my girlfriend" when he passed the OT tables on the way to speech  Response to previous treatment: did not recall   Pain Scale:  0/10 on VAS currently.   Pain Location: n/a  Objective:   UNTIMED  Procedure Min.   Speech- Language- Voice Therapy  40    Total Untimed Units: 1  Charges Billed/# of units: 1    Short Term Goals: (4 weeks) Current Progress:   1. Pt will answer multiunit y/n Questions with 90% acc indly to improve auditory comtprehension.   Progressing/ Not Met 7/19/2019   Not formally addressed     2. Pt will identify right-left on body with 90% acc indly to improve comprehension of temporal directions.  100% acc indly   Goal Met 7/19/19 / Discontinue    3. Pt will follow 3 unit commands with 90% acc indly to improve auditory comprehension.  Progressing/ Not Met 7/19/2019   Not formally addressed     4. Pt will label items in pictures with 90%acc indly to improve confrontational naming.   Progressing/ Not Met 7/19/2019   75% acc indly, 100% acc given cues to use semantic feature analysis.    5. Pt will complete word " "finding task (i.e. Creating subject, verb, object pairs in VNEST protocol) with 90% acc min A to improve word fluency.  Progressing/ Not Met 7/19/2019   Not formally addressed     6. Pt will participate in Verb Network Strength Training for 2-4 verbs per session with min A to improve word fluency.   Progressing/ Not Met 7/19/2019   Not formally addressed     7. Pt will participate in an assessment of reading and writing skills.   Progressing/ Not Met 7/19/2019   Not formally addressed      8.  pt will complete convergent naming tasks with 90% acc indly to improve verbal expression.   Progressing/ Not Met 7/19/2019    Not formally addressed     Picture description for an informal assessment of language progress:    "boy flying kite. Got that one? Man catching the fish. Uh boy and a girl sitting on a boat. A man and a wife and a dog and uh the boy and the man cant go no where so they pouring a drink. Uh a kid on a seaboat. Cant get that word out for some reason. It's a boat but not a boat but a uh I cant believe I cant get that word out. We will get back to it later. A car, a house, a tree, uh. Well that's more or less it."  Patient Education/Response:   Patient progress (increased conversational speech), purpose of exercises (target underlying skills to improve language at home), and plan moving forward (schedule more visits next session to finish POC as deficits still persist) were reviewed. Pt verbalized understanding to all.   Assessment:   Kamar is progressing well towards his goals. Increased length of utterance and appropriate syntax with use of content words in conversation today. MET STG2. Consider expressive labeling of right and left.  Current goals remain appropriate. Goals to be updated as necessary.   Pt prognosis is Fair. Pt will continue to benefit from skilled outpatient speech and language therapy to address the deficits listed in the problem list on initial evaluation, provide pt/family education " and to maximize pt's level of independence in the home and community environment.       Medical necessity is demonstrated by the following IMPAIRMENTS:  Decreased efficiency and effecitveness of communication because of decreased verbal expression limits communication with known and unknown communication partners in social, medical, and emergent situations.   Barriers to Therapy: transportation; wife work schedule; co-pay for multiple disciplines   Pt's spiritual, cultural and educational needs considered and pt agreeable to plan of care and goals.    Plan:   Continue POC with focus on verbal fluency.    GAIL Selby, CCC-SLP   7/19/2019

## 2019-07-19 ENCOUNTER — CLINICAL SUPPORT (OUTPATIENT)
Dept: REHABILITATION | Facility: HOSPITAL | Age: 69
End: 2019-07-19
Attending: INTERNAL MEDICINE
Payer: MEDICARE

## 2019-07-19 DIAGNOSIS — R47.01 BROCA'S APHASIA: ICD-10-CM

## 2019-07-19 PROCEDURE — 92507 TX SP LANG VOICE COMM INDIV: CPT | Mod: PN

## 2019-07-25 ENCOUNTER — CLINICAL SUPPORT (OUTPATIENT)
Dept: REHABILITATION | Facility: HOSPITAL | Age: 69
End: 2019-07-25
Attending: INTERNAL MEDICINE
Payer: MEDICARE

## 2019-07-25 DIAGNOSIS — R29.898 RIGHT ARM WEAKNESS: ICD-10-CM

## 2019-07-25 DIAGNOSIS — R63.39 SELF-CARE DEFICIT FOR FEEDING: ICD-10-CM

## 2019-07-25 DIAGNOSIS — R27.8 COORDINATION IMPAIRMENT: ICD-10-CM

## 2019-07-25 PROCEDURE — 97110 THERAPEUTIC EXERCISES: CPT | Mod: PN

## 2019-07-25 NOTE — PROGRESS NOTES
Occupational Therapy Daily Treatment Note     Date: 7/25/2019  Name: Kamar Doshi  Clinic Number: [id number]    Therapy Diagnosis:   Encounter Diagnoses   Name Primary?    Right arm weakness     Coordination impairment     Self-care deficit for feeding      Physician: Roberta King NP    Physician Orders: Eval and Treat  Medical Diagnosis: right arm weakness  Evaluation Date: 4/4/2019  Plan of Care Expiration Period:6/11/19 to 8/6/19  Insurance Authorization period Expiration:7/2/19  Date of Return to MD: not scheduled  Visit # / Visits Authorized: 3/12  (15 total visits)  FOTO: 15th visit     Time In: 8:00 am  Time Out:  9:00 am  Total Billable (one on one) Time:  60 minutes     Precautions: Standard      Subjective     Pt reports:   How long is it for today? I am ok.  he was not compliant with home exercise program given last session.   Response to previous treatment: no complaints  Functional change: assisted with cutting the grass per wife    Pain: 0/10 no pain reported   Location:  Right shoulder    Objective     Kamar received therapeutic exercises for 60  minutes including:  -Sci fit performed 8 min forward direct with  verbal cues to for directional changes  -Matrix chest press 10# x 2 sets 10 reps              tricep curls 40# x 3 sets 10 reps  -reaching to overhead shelf with cups up with no resistance                                                               Down to shoulder height with 2# wrist cuff                                                               Shoulder height to floor with 2# cuff wgt  6 kg ball two handed bounce on horizontal tramp x 30 reps - no misses  500 gram ball attempts to bounce palm down with right hand 50% correct of 30 bounces                         Bounce alternating hands x 30 reps with high accuracy  -red theraband ER x 2 sets 10 reps  -wrist curl up bar with 4# x 2 cycles - difficulty with coordinating motion     CMS Impairment/Limitation/Restriction  for FOTO neuro Survey     Therapist reviewed FOTO scores for Kamar Doshi on 7/25/2019.   FOTO documents entered into [employer] - see Media section.     Limitation Score:   Category: Self Care     Current : CL = least 60% but < 80% impaired, limited or restricted 57( 2% less)  Goal: CJ = at least 20% but < 40% impaired, limited or restricted 40%  Discharge:            Home Exercises and Education Provided     Education provided:    -encouraged Kamar to be active at home.    Written Home Exercises Provided: Patient instructed to cont prior HEP.  Exercises were reviewed and Kamar was able to demonstrate them prior to the end of the session.  Kamar demonstrated fair  understanding of the HEP provided.   .   See EMR under Patient Instructions for exercises provided 4/4/19.        Assessment     Pt would continue to benefit from skilled OT.  Pt is engaging in tasks at home per his wife, he is unable to communicate that to me.  He tolerated resistance with arm use during functional reaching as well. Pt did have some difficulty with coordination of right hand with gross motor activities.    Kamar is progressing  towards his goals and there are no updates to goals at this time. Pt prognosis is Good.     Pt will continue to benefit from skilled outpatient occupational therapy to address the deficits listed in the problem list on initial evaluation provide pt/family education and to maximize pt's level of independence in the home and community environment.     Anticipated barriers to occupational therapy: his aphasia    Pt's spiritual, cultural and educational needs considered and pt agreeable to plan of care and goals.     Modified Goals:  Short Term Goals:   Pt will be able to demonstrate his HEP in clinic with 100% accuracy- not met   Pt will increase AROM of RUE x 30* of elevation - progressing   Pt will increase right shoulder  strength to 4+/5 - progressing     Long Term Goals:  Pt will demonstrate elevation of RUE  to reach kitchen cabinet to reach for cup with RUE 75% of attempts  Pt will be able to assist in carrying groceries into the house with RUE 2 x week or as needed   Pt will assist wife in folding clothes 1 x week with the use of right hand.     Plan   Certification Period/Plan of care expiration: 6/11/19 to 8/6/19  Pt to attend OT every other week secondary to all three disciplines require therapy which creates a financial burden.      UMA [place]

## 2019-07-26 ENCOUNTER — CLINICAL SUPPORT (OUTPATIENT)
Dept: REHABILITATION | Facility: HOSPITAL | Age: 69
End: 2019-07-26
Attending: INTERNAL MEDICINE
Payer: MEDICARE

## 2019-07-26 DIAGNOSIS — R47.01 BROCA'S APHASIA: ICD-10-CM

## 2019-07-26 PROCEDURE — 92507 TX SP LANG VOICE COMM INDIV: CPT | Mod: PN

## 2019-07-26 NOTE — PROGRESS NOTES
"Outpatient Neurological Rehabilitation   Speech and Language Therapy Daily Note  Date:  7/26/2019     Name: Kamar Doshi   MRN: [id number]   Therapy Diagnosis:   Encounter Diagnosis   Name Primary?    Broca's aphasia    Physician: Bethel Harley MD  Physician Orders: ST eval and treat  Medical Diagnosis: Broca's aphasia [R47.01]     Visit # / Visits Authorized:  2 / 5 (6 previously)  Date of Evaluation:  5/3/2019   Insurance Authorization Period: 7/5/19 to 7/4/20   Plan of Care Certification:    5/3/2019 to 6/28/19   Extended POC:  6/28/2019 to 8/23/19  Progress Note: 8/7/19  Visits Cancelled: 1  Visits No Show: 1    Time In:  945  Time Out:  1030  Total Billable Time: 45 minutes     G-Code 2/ 10   Eval 5/3/19 - comprehension CJ/CI   Update 6/6/19 - Comprehension CJ/CI    Updated  6/28/19 - comprehension CJ/CI     Precautions: Standard and Fall    Subjective:   Pt reports: "that's my girlfriend" when he passed the OT tables on the way to speech  Response to previous treatment: did not recall   Pain Scale:  0/10 on VAS currently.   Pain Location: n/a  Objective:   UNTIMED  Procedure Min.   Speech- Language- Voice Therapy  40    Total Untimed Units: 1  Charges Billed/# of units: 1    Short Term Goals: (4 weeks) Current Progress:   1. Pt will answer multiunit y/n Questions with 90% acc indly to improve auditory comtprehension.   Progressing/ Not Met 7/26/2019   Pt answered multiunit y/n ?'s w/ 80% acc ind'ly and 100% acc given Renee, such as semantic cues.    2. Pt will identify right-left on body with 90% acc indly to improve comprehension of temporal directions.  100% acc indly   Goal Met 7/19/19 / Discontinue    3. Pt will follow 3 unit commands with 90% acc indly to improve auditory comprehension.  Progressing/ Not Met 7/26/2019   Pt completed 3 unit commands w/ 20% acc given maxA.   Pt completed 2 unit commans w/ 75% acc given maxA.    4. Pt will label items in pictures with 90%acc indly to improve " "confrontational naming.   Progressing/ Not Met 7/26/2019   80% acc indly, 100% acc given cues to use semantic feature analysis.   Pt completed confrontational naming task w/ 70% acc ind'ly, 100% acc given cues to use semantic feature analysis.     5. Pt will complete word finding task (i.e. Creating subject, verb, object pairs in VNEST protocol) with 90% acc min A to improve word fluency.  Progressing/ Not Met 7/26/2019   Not formally addressed     6. Pt will participate in Verb Network Strength Training for 2-4 verbs per session with min A to improve word fluency.   Progressing/ Not Met 7/26/2019   Not formally addressed     7. Pt will participate in an assessment of reading and writing skills.   Progressing/ Not Met 7/26/2019   Not formally addressed      8.  pt will complete convergent naming tasks with 90% acc indly to improve verbal expression.   Progressing/ Not Met 7/26/2019    Not formally addressed     Picture description for an informal assessment of language progress:    "boy flying kite. Got that one? Man catching the fish. Uh boy and a girl sitting on a boat. A man and a wife and a dog and uh the boy and the man cant go no where so they pouring a drink. Uh a kid on a seaboat. Cant get that word out for some reason. It's a boat but not a boat but a uh I cant believe I cant get that word out. We will get back to it later. A car, a house, a tree, uh. Well that's more or less it."  Patient Education/Response:   Discussed pt progress and affect of not sleeping on performance. Pt stated he feels like he has good and bad days. Reviewed word retrieval strategies and provided pt with handout. Pt verbalized understanding to all.     Word Retrieval Strategies:   · Visualization: try to see the thing in your head. Concentrate on the details of the picture and sometimes the word will come  · Association:  Think of things that go with the word. For example, bread goes with butter, so if you are trying to think of the " "word "butter", you may think of the word "bread".  · Gesture: use the hand motion you would use with the thing you are thinking of. For example, if you are thinking of the word "wash", you might make a motion as if you are washing your hands.   · Description:  Describe the thing to the other person you are talking to. Even if the word does not come to you, the other person may be able to guess what you are trying to say.   · First Letter or Sound:  Try to think of the first letter of the word. Sometimes you can think of the first letter even if you cannot think of the word. The letter may "lead" you to the word. You might even try going down the alphabet to find the first letter.    Assessment:   Kamar is progressing well towards his goals. Pt exhibited decreased accuracy across all tasks today. Pt had particular difficulty completing auditory comprehension tasks. Goals to be updated as necessary.   Pt prognosis is Fair. Pt will continue to benefit from skilled outpatient speech and language therapy to address the deficits listed in the problem list on initial evaluation, provide pt/family education and to maximize pt's level of independence in the home and community environment.       Medical necessity is demonstrated by the following IMPAIRMENTS:  Decreased efficiency and effecitveness of communication because of decreased verbal expression limits communication with known and unknown communication partners in social, medical, and emergent situations.   Barriers to Therapy: transportation; wife work schedule; co-pay for multiple disciplines   Pt's spiritual, cultural and educational needs considered and pt agreeable to plan of care and goals.    Plan:   Continue POC with focus on verbal fluency.    GAIL Bloom, CF-SLP  Speech-Language Pathologist   07/26/2019    "

## 2019-07-30 ENCOUNTER — DOCUMENTATION ONLY (OUTPATIENT)
Dept: REHABILITATION | Facility: HOSPITAL | Age: 69
End: 2019-07-30

## 2019-07-30 DIAGNOSIS — R27.8 COORDINATION IMPAIRMENT: ICD-10-CM

## 2019-07-30 DIAGNOSIS — R63.39 SELF-CARE DEFICIT FOR FEEDING: ICD-10-CM

## 2019-07-30 DIAGNOSIS — R29.898 RIGHT ARM WEAKNESS: ICD-10-CM

## 2019-08-05 ENCOUNTER — DOCUMENTATION ONLY (OUTPATIENT)
Dept: REHABILITATION | Facility: HOSPITAL | Age: 69
End: 2019-08-05

## 2019-08-05 DIAGNOSIS — R27.8 COORDINATION IMPAIRMENT: ICD-10-CM

## 2019-08-05 DIAGNOSIS — R29.898 RIGHT ARM WEAKNESS: ICD-10-CM

## 2019-08-05 DIAGNOSIS — R63.39 SELF-CARE DEFICIT FOR FEEDING: ICD-10-CM

## 2019-08-05 NOTE — PROGRESS NOTES
Occupational Therapy  Plan of Care     Date: 8/5/2019  Name: Kamar Doshi  Clinic Number: [id number]    Therapy Diagnosis:   Encounter Diagnoses   Name Primary?    Right arm weakness     Coordination impairment     Self-care deficit for feeding      Physician: Roberta King NP    Physician Orders: Eval and Treat  Medical Diagnosis: right arm weakness  Evaluation Date: 4/4/2019  Visit # / Visits Authorized: 4/12  (16 total visits)  FOTO: 15th visit     Time In: 12:00 pm  Time Out:  12:25 pm     Precautions: Standard  Total Visits Received: 16  Cancelled Visits: 2  No Show Visits: 0    Current Certification Period:  6/11/19 to 8/6/19  Precautions:  standard  Visits from Evaluation Date:  15  Functional Level Prior to Evaluation:  Full time employed, attend to household outside needs.      Subjective     Pt reports:   I am doing okay.  Wife reports he is doing some light tasks at home but she limits him using the stove and removing items out of the microwave.     Objective     Kamar received therapeutic exercises for 25  minutes including:  -Sci fit performed 8 min forward direct with  verbal cues to for directional changes    Joint Evaluation  AROM  4/4/2019 AROM  4/4/2019  AROM 5/29/19 AROM  7/2/19 AROM   8/5/19     Left Right Right right Right    Shoulder flex 0-180 120 80    100    100    105   Shoulder Abd 0-180 110 70    70    90    105   Shoulder ER 0-90 30 30 sb    55    60 sb    55   Shoulder IR 0-90 To low back 45  To low back  to low back    45   Shoulder Extension 0-80 45 40    50    50    60   Shoulder Horizontal adduction 0-90 neutral neutral         Elbow flex/ext 0-150 WFL/WFL WFL/-15  WFL/-10   Wfl/-10    WFL/-10   Wrist flex 0-80 WFL WFL       Wrist ext 0-70 WFL WFL       Supination 0-80 WFL WFL       Pronation 0-80 WFL WFL          Fist: right  tight   right hand 90% of fist, opposes thumb to all tips   left hand full     Strength 4/4/2019 4/4/2019 5/29/10  7/2/19   8/5/19   **within  available ROM** Left Right   Right   Right   Right   Shoulder flex 5/5 3/5   4/5   4+/5   4+/5   Shoulder abd 4/5 3/5   4-/5   4-/5    4-/5   Shoulder ER 4/5 3/5   3+/5   3+/5    4-/5   Shoulder IR 5/5 4/5   3+/5   4-/5    4-/5   Shoulder Extension 5/5        Shoulder Horizontal adduction5 5/5        Elbow flex 5/5 5/5      Elbow ext 5/5 5/5      Wrist flex 5/5 5/5      Wrist ext 5/5 5/5      Supination 5/5 4+/5   4/5    4/5    4+/5   Pronation 5/5 4-/5   4-/5    4/5    5/5       Strength: (PAIGE Dynamometer in lbs.) Average 3 trials, Position II:       4/4/2019 4/4/2019 5/29/19 7/2/19 8/5/19     Left Right Right  right  Right    Rung II 45.1# 19.4# 26.9# 32.6# 32.6#      Pinch Strength (Measured in psi)       4/4/2019 4/4/2019 5/29/19 7/2/19 8/5/19     Left Right Right  Right  Right    Zheng Pinch 20 psi 13 psi 13 psi 13.6 psi 13 psi   3pt Pinch 16 psi 8 psi 8.3 psi 9.3  psi 9.3 psi                  Fine Motor Coordination: 9 Hole Peg Test  Left  4/4/2019 Right 4/4/2019 right 4/30/19 Right   5/29/19 Right   7/2/19 Right  8/5/19     21 seconds no drops 1 min 5 seconds no drops  45 seconds no drops 51 seconds  No drops  52 seconds   34 seconds 1 drop         CMS Impairment/Limitation/Restriction for FOTO neuro Survey     Therapist reviewed FOTO scores for Kamar Doshi on 8/5/2019.   FOTO documents entered into [employer] - see Media section.     Limitation Score:   Category: Self Care     Current : CL = least 60% but < 80% impaired, limited or restricted 57%(decreased 2%)   Goal: CJ = at least 20% but < 40% impaired, limited or restricted 40%    Discharge:           Assessment     Pt would continue to benefit from skilled OT.  Pt is engaging in tasks at home per his wife, he is unable to communicate that to me.  He tolerated resistance with arm use during functional reaching as well. Pt did have some difficulty with coordination of right hand with gross motor activities.    Kamar is progressing  towards his goals  and there are no updates to goals at this time. Pt prognosis is Good.     Anticipated barriers to occupational therapy: his aphasia    Pt's spiritual, cultural and educational needs considered and pt agreeable to plan of care and goals.    Short Term Goals: remain appropriate   Pt will be able to demonstrate his HEP in clinic with 100% accuracy- not met   Pt will increase AROM of RUE x 30* of elevation -met with abduction -8/5/19,  Progressing with flexion   Pt will increase right shoulder  strength to 4+/5 - progressing     Long Term Goals:  Pt will demonstrate elevation of RUE to reach kitchen cabinet to reach for cup with RUE 75% of attempts- progressing  Pt will be able to assist in carrying groceries into the house with RUE 2 x week or as needed   Pt will assist wife in folding clothes 1 x week with the use of right hand -met 8/5/19.     Reasons for Recertification of Therapy:   *  Plan   Certification Period/Plan of care expiration: 8/16/19 to 10/11/19  Pt to attend OT every other week secondary to all three disciplines require therapy which creates a financial burden.  Will return to weekly if financial assistance approved.    UMA [place]    I certify the need for these services furnished under this plan of treatment and while under my care    ____________________________________  Physician/Referring Practitioner    _______________  Date of Signature

## 2019-08-15 ENCOUNTER — CLINICAL SUPPORT (OUTPATIENT)
Dept: REHABILITATION | Facility: HOSPITAL | Age: 69
End: 2019-08-15
Attending: INTERNAL MEDICINE
Payer: MEDICARE

## 2019-08-15 DIAGNOSIS — R27.8 COORDINATION IMPAIRMENT: Primary | ICD-10-CM

## 2019-08-15 DIAGNOSIS — R29.898 RIGHT LEG WEAKNESS: ICD-10-CM

## 2019-08-15 DIAGNOSIS — R26.89 POOR BALANCE: ICD-10-CM

## 2019-08-15 PROCEDURE — 97112 NEUROMUSCULAR REEDUCATION: CPT | Mod: PN

## 2019-08-15 PROCEDURE — 97110 THERAPEUTIC EXERCISES: CPT | Mod: PN

## 2019-08-15 NOTE — PROGRESS NOTES
Physical Therapy Discharge Summary      [facility]  [facility] Number[facility] [id number]     Therapy Diagnosis:        Encounter Diagnoses   Name Primary?    Coordination impairment Yes    Poor balance      Right leg weakness        Physician: Bethel Harley MD     Visit Date: 6/24/2019     Physician Orders: PT Eval   Medical Diagnosis from Referral: Cerebrovascular accident (CVA) due to embolism of left middle cerebral artery and gait difficulty  Evaluation Date: 5/21/2019  Last PN: 6/24/2019  Authorization Period Expiration: 4/08/2020  Plan of Care Expiration: 8/21/2019  Visit # / Visits authorized: 4/ 12     Time In: 1116  Time Out: 1155  Total Billable Time: 39 minutes     Precautions: Standard, Fall and CVA     Subjective      Pt reports: He is doing well today. Pt's wife reports he still has some trouble with dragging R foot.      He was compliant with home exercise program.  Response to previous treatment: none to report  Functional change: none to report     Pain: 0/10  Location: n/a     Objective      BOLD = performed today     Kamar received therapeutic exercises to develop strength, endurance, posture and core stabilization for 15 minutes including:  PT attempts to have pt ambulate on treadmill. He ambulates for 2 minutes then requires rest then 3 minutes before requiring rest. Pt ambulates at 1.5 mph.   8 mins scifit StepOne level 1.0 for increased cardiovascular endurance     Kamar participated in neuromuscular re-education activities to improve: Balance, Coordination, Proprioception and Posture for 24 minutes. The following activities were included:       Evaluation (5/21/2019) 6/24/2019 8/15/2019   30 second sit to stand 6 without UE use 8 with UE support on thighs 7 with UE support on thighs   TUG (w/ no AD) 12.4 seconds 14.5 +14.3 + 12.4 = 13.7 seconds 11.7 seconds   Richter 44/56 50/56 52/56   DGI NT 19/24 19/24   SLS R: <2 seconds L: 5 seconds R: 30 seconds L: 20 seconds R: 3 seconds  L: 5  seconds      BREWSTER Assessment  1. Sitting to Standin - able to stand without using hands and stabilize independently  2. Standing Unsupported: 4 - able to stand safely 2 minutes without hold  3. Sitting Unsupported: 4 - able to sit safely and securely 2 minutes  4. Standing to Sittin - sits safely with minimal use of hands  5. Pivot Transfer: 4 - able to trnasfer safely with minor use of hands  6. Standing with Eyes Closed: 4 - albe to stand 10 seconds safely  7. Standing with Feet Together: 4 - able to place feet together independently and stand 1 minute safely  8. Reaching Forward with Outstretched Arm: 3 - can reach forward 12 cm/5 inches safely  9. Retrieving Object from Floor: 4 - able to  slipper safely and easily  10. Turning to Look Behind: 4 - looks behind from both sides and weights shifts well  11. Turning 360 Degrees: 3 - able to trun 360 safely one side only in 4 seconds or less  12. Placing Alternate Foot on Step: 4 - able to stand independently safely and complete 8 steps in 20 seconds  13. Standing with One Foot in Front: 3 - able to place foot ahead of other independently and hold 30 seconds  14. Standing on One Foot: 3- able to lift leg independently and hold 5-10 seconds  Total: 52  Maximum: 56  (low fall risk)    DYNAMIC GAIT INDEX:    1. Gait level surface= 3   3) Normal: walks 20', no AD, good speed, no evidence for imbalance,   normal gait pattern   2) Mild impairment: walks 20', uses AD, slower speed, mild gait deviations   1)  moderate impairment: walks 20', slow speed, abnormal gait pattern,   evidence for imbalance.   0)  severe impairment: cannot walk 20' without assistance, severe gait   deviations or imbalance  2. Change in gait speed: 3   3) Normal: able to smoothly change walking speed without loss of balance   or gait deviation. Shows a significant difference in walking speeds    between fast, slow, and normal speeds.    2) Mild impairment: is able to change speeds but  "demonstrates mild gait   deviations, or not gait deviations but unable to achieve a significant   change in velocity, or uses AD   1) Moderate impairment: makes only minor adjustments in walking speed,   or accomplishes a change in speed with significant gait deviation, or   changes speed but loses balance and is able to recover and keep    walking    0) Severe impairment: cannot change speeds, or loses balance and has to   reach for wall or be caught.  3. Gait with horizontal head turns: 2   3) Normal: performs head turns smoothly with no change in gait.   2) Mild impairment: performs head turns smoothly with slight velocity, I.e.   Minor disruption to smooth gait path or uses AD   1) Moderate impairment: performs head turns with moderate change in gait   velocity, slows down, staggers but recovers, can continue to walk   0) Severe impairment: performs task with severe disruption of gait, i.e.   Staggers outside of 15" path, loses balance , stops, reaches for wall  4. Gait with vertical head turns: 2   3) Normal: performs head turns smoothly with no change in gait   2) Mild impairment: performs head turns smoothly with slight velocity, I.e.   Minor disruption to smooth gait path or uses AD   1) Moderate impairment: performs head turns with moderate change in gait   velocity, slows down, staggers but recovers, can continue to walk   0) Severe impairment: performs task with severe disruption of gait, i.e.   Staggers outside of 15" path, loses balance , stops, reaches for wall  5. Gait and pivot turn: 2   3)  Normal: pivot turns safely within 3 sec and stops quickly with no LOB   2)  Mild impairment: pivot turns safely in > 3 sec and stops with no LOB   1) Moderate impairment: turns slowly, requires verbal cueing, requires   several small steps to catch balance following turn and stop   0)  Severe impairment: cannot turn safely, requires assistance to stop and   Turn  6. Step over obstacle:  2   3) normal: is able to " step over box without changing speed, no LOB   2)  Mild impairment: is able to step over box, but must slow down and adjust   steps to clear box safely   1)  Moderate impairment: is able to step over box but must stop, then step   over. May require verbal cueing   0) Severe impairment: cannot perform without assistance  7. Step around obstacle: 3   3) Normal: able to walk around cones safely without changing gait speed;   no LOB   2)  Mild impairment: able to step around cones, but must slow down to adjust   steps to clear cones   1) moderate impairment: able to clear cones but must significantly slow   speed to accomplish or requires verbal cueing   0)  Severe impairment: unable to clear cones, walks into 1 or both cones, or   requires physical assistance  8. Steps: 2   3) Normal: alternating feet, no rail   2) Mild impairment: alternating feet, must use rail   1) Moderate impairment: 2 feet a stair, must use rail   0)  Severe impairment: cannot perform safely  Total: 19/24      CMS Impairment/Limitation/Restriction for FOTO Discharge Survey    Therapist reviewed FOTO scores for Kamar Doshi on 8/15/2019.   FOTO documents entered into [employer] - see Media section.    Limitation Score: 37%  Category: Mobility    Current : CJ = at least 20% but < 40% impaired, limited or restricted  Goal: CK = at least 40% but < 60% impaired, limited or restricted  Discharge: CJ = at least 20% but < 40% impaired, limited or restricted                         Home Exercises Provided and Patient Education Provided      Education provided:   - cont with current HEP  - importance of balance activities     Written Home Exercises Provided: Patient instructed to cont prior HEP.  Exercises were reviewed and Kamar was able to demonstrate them prior to the end of the session.  Kamar demonstrated good  understanding of the education provided.      See EMR under Patient Instructions for exercises provided during initial  evaluation.     Assessment      Mr. Doshi has progress during his time in skilled PT services. Despite poor attendance in sessions he has demonstrated progress in balance and overall endurance. Pt is to be discharged from skilled PT services on this date due to poor compliance with attendance. Pt and his wife instructed to continue with OT and SLP at this time as some of his bigger deficits lie there. He has met all STGs and 2/4 LTGs. Goals not met and little progress shown between visits due to poor adherence to appointments.      Kamar is progressing well towards his goals.   Pt prognosis is Good.      Pt will continue to benefit from skilled outpatient physical therapy to address the deficits listed in the problem list box on initial evaluation, provide pt/family education and to maximize pt's level of independence in the home and community environment.      Pt's spiritual, cultural and educational needs considered and pt agreeable to plan of care and goals.     Anticipated barriers to physical therapy: none     Goals:  Short Term Goals to be achieved in 4 weeks:   1. Patient will demonstrate safety with ambulation tasks. MET  2. Patient will demonstrate independence with HEP.  MET  3. Patient will increase ambulation distance to 300' with no incidence of R LE toe drag. MET     Long Term Goals to be achieved in 8 weeks:   1. Pt will improve SLS time to 10 seconds on B LE with supervision in order to increase step length in gait. MET  2. Pt will increase score on BBS to 49 in order to decrease risk for fall. MET  3. Pt will decrease score on TUG to 8 seconds to better align him with normative for his age and gender. NOT MET  4. New Goal 6/24/2019: Pt will increase DGI score to 21/24 in order to decrease his risk for fall. NOT MET     Plan      Pt is discharged from skilled PT services at this time.     Harrison Vicente, PT

## 2019-08-16 ENCOUNTER — CLINICAL SUPPORT (OUTPATIENT)
Dept: REHABILITATION | Facility: HOSPITAL | Age: 69
End: 2019-08-16
Attending: INTERNAL MEDICINE
Payer: MEDICARE

## 2019-08-16 DIAGNOSIS — R27.8 COORDINATION IMPAIRMENT: ICD-10-CM

## 2019-08-16 DIAGNOSIS — R63.39 SELF-CARE DEFICIT FOR FEEDING: ICD-10-CM

## 2019-08-16 DIAGNOSIS — R29.898 RIGHT ARM WEAKNESS: ICD-10-CM

## 2019-08-16 PROCEDURE — 97110 THERAPEUTIC EXERCISES: CPT | Mod: PN

## 2019-08-16 NOTE — PROGRESS NOTES
Occupational Therapy Daily Treatment Note     Date: 8/16/2019  Name: Kamar Doshi  Clinic Number: [id number]    Therapy Diagnosis:   Encounter Diagnoses   Name Primary?    Right arm weakness     Coordination impairment     Self-care deficit for feeding      Physician: Roberta King NP    Physician Orders: Eval and Treat  Medical Diagnosis: right arm weakness  Evaluation Date: 4/4/2019  Plan of Care Expiration Period: 8/16/19 to 10/11/19  Insurance Authorization period Expiration: 10/11/19  Date of Return to MD: not scheduled  Visit # / Visits Authorized: 1/5  (16 total visits)  FOTO: 15th visit     Time In: 9:40 am  Time Out: 10:30 am  Total Billable (one on one) Time:  50 minutes     Precautions: Standard      Subjective     Pt reports:   How long is it for today? I am ok.  he was not compliant with home exercise program given last session.   Response to previous treatment: no complaints  Functional change: assisted with cutting the grass per wife    Pain: 0/10 no pain reported   Location:  Right shoulder    Objective     Kamar received therapeutic exercises for 50  minutes including:  -Sci fit performed 8 min forward direct with  verbal cues to for directional changes  -Matrix chest press 10# x 2 sets 10 reps              Horizontal rows 25# x 10 reps 30 x 10 reps              tricep curls 45# x 2 sets 10 reps  -right hand lifting of 7.5 lb kettle bell to counter from the side and forward x 10 reps each  -wrist curl up bar with 3# x 2 cycles each directions constant cues for proper performance  -red theraband ER x 2 sets 15 reps  -ultra gripper setting # 2  performed x 5 min     Home Exercises and Education Provided     Education provided:   Continue being active with tasks at home.  Written Home Exercises Provided: Patient instructed to cont prior HEP.  Exercises were reviewed and Kamar was able to demonstrate them prior to the end of the session.  Kamar demonstrated fair  understanding of the HEP  provided.   .   See EMR under Patient Instructions for exercises provided 4/4/19.        Assessment     Pt would continue to benefit from skilled OT.  Kamar requires prompts for proper technique with exercises. Pt is able to complete resistive exercises well. No complaints of pain.    Kamar is progressing  towards his goals and there are no updates to goals at this time. Pt prognosis is Good.     Pt will continue to benefit from skilled outpatient occupational therapy to address the deficits listed in the problem list on initial evaluation provide pt/family education and to maximize pt's level of independence in the home and community environment.     Anticipated barriers to occupational therapy: his aphasia    Pt's spiritual, cultural and educational needs considered and pt agreeable to plan of care and goals.     Short Term Goals: remain appropriate   Pt will be able to demonstrate his HEP in clinic with 100% accuracy- not met   Pt will increase AROM of RUE x 30* of elevation -met with abduction -8/5/19,  Progressing with flexion   Pt will increase right shoulder  strength to 4+/5 - progressing     Long Term Goals:  Pt will demonstrate elevation of RUE to reach kitchen cabinet to reach for cup with RUE 75% of attempts- progressing  Pt will be able to assist in carrying groceries into the house with RUE 2 x week or as needed   Pt will assist wife in folding clothes 1 x week with the use of right hand -met 8/5/19.     Plan   Certification Period/Plan of care expiration:8/16/19 to 10/11/19  Pt to attend OT every other week secondary to all three disciplines require therapy which creates a financial burden.      UMA [place]

## 2019-08-18 NOTE — PROGRESS NOTES
Occupational Therapy Daily Treatment Note     Date: 8/19/2019  Name: Kamar Doshi  Clinic Number: [id number]    Therapy Diagnosis:   Encounter Diagnoses   Name Primary?    Right arm weakness     Coordination impairment     Self-care deficit for feeding      Physician: Roberta King NP    Physician Orders: Eval and Treat  Medical Diagnosis: right arm weakness  Evaluation Date: 4/4/2019  Plan of Care Expiration Period: 8/16/19 to 10/11/19  Insurance Authorization period Expiration: 10/11/19  Date of Return to MD: not scheduled  Visit # / Visits Authorized: 2/5  (16 total visits)  FOTO: 15th visit     Time In: 11:03 am  Time Out: 11:40 am  Total Billable (one on one) Time:  30 minutes     Precautions: Standard      Subjective     Pt reports:  Pt without specific comment today.   he was not compliant with home exercise program given last session.   Response to previous treatment: no complaints  Functional change: assisted with cutting the grass per wife    Pain: 0/10 no pain reported   Location:  Right shoulder    Objective     Kamar received therapeutic exercises for 30  minutes including:  -Sci fit performed 8 min forward direct with  verbal cues to for directional changes  -Matrix chest press 15# x 2 sets 10 reps              Horizontal rows 30# x 30 reps              tricep curls 40# x 2 sets 10 reps  -7.5# kettle bell lift from floor to table x 15 reps  -5 # kettle bell move on table from right to left x 10 reps  -ergo gripper with 2 red and 1 yellow band performed x 5 min     Home Exercises and Education Provided     Education provided:   Continue being active with tasks at home.  Written Home Exercises Provided: Patient instructed to cont prior HEP.  Exercises were reviewed and Kamar was able to demonstrate them prior to the end of the session.  Kamar demonstrated fair  understanding of the HEP provided.   .   See EMR under Patient Instructions for exercises provided 4/4/19.        Assessment      Pt would continue to benefit from skilled OT. Kamar had difficulty following instructions with exercises today. He tolerated resistive exercises well.   Kamar is progressing  towards his goals and there are no updates to goals at this time. Pt prognosis is Good.     Pt will continue to benefit from skilled outpatient occupational therapy to address the deficits listed in the problem list on initial evaluation provide pt/family education and to maximize pt's level of independence in the home and community environment.     Anticipated barriers to occupational therapy: his aphasia    Pt's spiritual, cultural and educational needs considered and pt agreeable to plan of care and goals.     Short Term Goals: remain appropriate   Pt will be able to demonstrate his HEP in clinic with 100% accuracy- not met   Pt will increase AROM of RUE x 30* of elevation -met with abduction -8/5/19,  Progressing with flexion   Pt will increase right shoulder  strength to 4+/5 - progressing     Long Term Goals:  Pt will demonstrate elevation of RUE to reach kitchen cabinet to reach for cup with RUE 75% of attempts- progressing  Pt will be able to assist in carrying groceries into the house with RUE 2 x week or as needed   Pt will assist wife in folding clothes 1 x week with the use of right hand -met 8/5/19.     Plan   Certification Period/Plan of care expiration:8/16/19 to 10/11/19  Pt to attend OT every other week secondary to all three disciplines require therapy which creates a financial burden.      UMA [place]

## 2019-08-19 ENCOUNTER — CLINICAL SUPPORT (OUTPATIENT)
Dept: REHABILITATION | Facility: HOSPITAL | Age: 69
End: 2019-08-19
Attending: INTERNAL MEDICINE
Payer: MEDICARE

## 2019-08-19 DIAGNOSIS — R63.39 SELF-CARE DEFICIT FOR FEEDING: ICD-10-CM

## 2019-08-19 DIAGNOSIS — R29.898 RIGHT ARM WEAKNESS: ICD-10-CM

## 2019-08-19 DIAGNOSIS — R27.8 COORDINATION IMPAIRMENT: ICD-10-CM

## 2019-08-19 PROCEDURE — 97110 THERAPEUTIC EXERCISES: CPT | Mod: PN

## 2019-08-28 NOTE — PROGRESS NOTES
Occupational Therapy Daily Treatment Note     Date: 8/29/2019  Name: Kamar Doshi  Clinic Number: [id number]    Therapy Diagnosis:   Encounter Diagnoses   Name Primary?    Right arm weakness     Coordination impairment     Self-care deficit for feeding      Physician: Roberta King, NP    Physician Orders: Eval and Treat  Medical Diagnosis: right arm weakness  Evaluation Date: 4/4/2019  Plan of Care Expiration Period: 8/16/19 to 10/11/19  Insurance Authorization period Expiration: 10/11/19  Date of Return to MD: not scheduled  Visit # / Visits Authorized: 3/5  (16 total visits)  FOTO: 15th visit     Time In: 10:30 am  Time Out: 11:00 am  Total Billable (one on one) Time:  30 minutes     Precautions: Standard      Subjective     Pt reports:  His leg hurts, he mentioned a fall 3 or 4 days ago.  He is unable to relay any specifics of how he fell and what exactly hurts.   he was not compliant with home exercise program given last session.   Response to previous treatment: no complaints  Functional change: assisted with cutting the grass per wife    Pain: 0/10 no pain reported   Location:  Right shoulder    Objective     Kamar received therapeutic exercises for 30  minutes including:  -Sci fit performed 8 min forward direct with  verbal cues to for directional changes initially performed standing then moved to sitting  Seated on elevated mat to ease sit to stand  -4# bicep curls sitting , pt instructed to count but did not 20 reps performed  -attempted jabs with 4# wgt pt unable to complete  -jabs with no resistance x 10 reps each arm   -ultra gripper setting  # 2 x 4 min  -1# wrist cuff staking cups from low table to cabinet top and back x 24 reps  Therapist spoke to wife re: his report of falling and hurting his leg.  She was encouraged to visit his primary doctor to determine if there is any injury. She was unaware he fell but stated he cut the grass and had some back pain.  Therapist stated he was  pointing to his hip and leg.   Home Exercises and Education Provided     Education provided:     Continue being active with tasks at home.  Written Home Exercises Provided: Patient instructed to cont prior HEP.  Exercises were reviewed and Kamar was able to demonstrate them prior to the end of the session.  Kamar demonstrated fair  understanding of the HEP provided.   .   See EMR under Patient Instructions for exercises provided 4/4/19.        Assessment     Pt would continue to benefit from skilled OT. Kamar was very distracted today and was self limited secondary to not feeling well. Very difficulty to determine what is wrong due to his broca's aphasia.  Kamar is progressing  towards his goals and there are no updates to goals at this time. Pt prognosis is Good.     Pt will continue to benefit from skilled outpatient occupational therapy to address the deficits listed in the problem list on initial evaluation provide pt/family education and to maximize pt's level of independence in the home and community environment.     Anticipated barriers to occupational therapy: his aphasia    Pt's spiritual, cultural and educational needs considered and pt agreeable to plan of care and goals.     Short Term Goals: remain appropriate   Pt will be able to demonstrate his HEP in clinic with 100% accuracy- not met   Pt will increase AROM of RUE x 30* of elevation -met with abduction -8/5/19,  Progressing with flexion   Pt will increase right shoulder  strength to 4+/5 - progressing     Long Term Goals:  Pt will demonstrate elevation of RUE to reach kitchen cabinet to reach for cup with RUE 75% of attempts- progressing  Pt will be able to assist in carrying groceries into the house with RUE 2 x week or as needed   Pt will assist wife in folding clothes 1 x week with the use of right hand -met 8/5/19.     Plan   Certification Period/Plan of care expiration:8/16/19 to 10/11/19  Pt to attend OT every other week secondary to all  three disciplines require therapy which creates a financial burden.      [place]

## 2019-08-29 ENCOUNTER — CLINICAL SUPPORT (OUTPATIENT)
Dept: REHABILITATION | Facility: HOSPITAL | Age: 69
End: 2019-08-29
Attending: INTERNAL MEDICINE
Payer: MEDICARE

## 2019-08-29 DIAGNOSIS — R29.898 RIGHT ARM WEAKNESS: ICD-10-CM

## 2019-08-29 DIAGNOSIS — R63.39 SELF-CARE DEFICIT FOR FEEDING: ICD-10-CM

## 2019-08-29 DIAGNOSIS — R27.8 COORDINATION IMPAIRMENT: ICD-10-CM

## 2019-08-29 PROCEDURE — 97110 THERAPEUTIC EXERCISES: CPT | Mod: PN

## 2019-09-12 ENCOUNTER — DOCUMENTATION ONLY (OUTPATIENT)
Dept: REHABILITATION | Facility: HOSPITAL | Age: 69
End: 2019-09-12

## 2019-09-19 ENCOUNTER — DOCUMENTATION ONLY (OUTPATIENT)
Dept: REHABILITATION | Facility: HOSPITAL | Age: 69
End: 2019-09-19

## 2019-09-19 NOTE — PROGRESS NOTES
Kamar did not arrive for his scheduled occupational therapy appointment today. 1st no show.  3 occupational therapy appointments were cancelled

## 2019-10-16 ENCOUNTER — DOCUMENTATION ONLY (OUTPATIENT)
Dept: REHABILITATION | Facility: HOSPITAL | Age: 69
End: 2019-10-16

## 2019-10-16 DIAGNOSIS — R27.8 COORDINATION IMPAIRMENT: ICD-10-CM

## 2019-10-16 DIAGNOSIS — R63.39 SELF-CARE DEFICIT FOR FEEDING: ICD-10-CM

## 2019-10-16 DIAGNOSIS — R29.898 RIGHT ARM WEAKNESS: ICD-10-CM

## 2019-10-16 NOTE — PROGRESS NOTES
Outpatient Therapy Discharge Summary     Name: Kamar Doshi  Clinic Number: [id number]    Therapy Diagnosis:   Encounter Diagnoses   Name Primary?    Right arm weakness     Coordination impairment     Self-care deficit for feeding         Physician: Roberta King NP     Physician Orders: Eval and Treat  Medical Diagnosis: right arm weakness  Evaluation Date: 2019    Date of Last visit: 29  Total Visits Received: 16  Cancelled Visits: 3  No Show Visits: 1    Assessment    Therapy visits very spread out due to difficulty with authorization for extensions from insurance company.     Goals: as of 19  Pt will be able to demonstrate his HEP in clinic with 100% accuracy- not met   Pt will increase AROM of RUE x 30* of elevation -met with abduction -19,  Progressing with flexion   Pt will increase right shoulder  strength to 4+/5 - progressing     Long Term Goals:  Pt will demonstrate elevation of RUE to reach kitchen cabinet to reach for cup with RUE 75% of attempts- progressing  Pt will be able to assist in carrying groceries into the house with RUE 2 x week or as needed   Pt will assist wife in folding clothes 1 x week with the use of right hand -met 19.     Discharge reason: Other:  Pt's wife cancelled all remaining visits and plan of care is .     Plan   This patient is discharged from Occupational Therapy

## 2019-10-17 DIAGNOSIS — E11.9 TYPE 2 DIABETES MELLITUS WITHOUT COMPLICATION, WITHOUT LONG-TERM CURRENT USE OF INSULIN: ICD-10-CM

## 2019-10-17 RX ORDER — METFORMIN HYDROCHLORIDE 500 MG/1
TABLET ORAL
Qty: 90 TABLET | Refills: 0 | Status: SHIPPED | OUTPATIENT
Start: 2019-10-17 | End: 2021-04-14

## 2019-11-27 ENCOUNTER — TELEPHONE (OUTPATIENT)
Dept: FAMILY MEDICINE | Facility: CLINIC | Age: 69
End: 2019-11-27

## 2020-02-14 DIAGNOSIS — Z12.11 COLON CANCER SCREENING: ICD-10-CM

## 2020-02-19 DIAGNOSIS — Z79.01 CHRONIC ANTICOAGULATION: ICD-10-CM

## 2020-02-19 DIAGNOSIS — I48.20 CHRONIC ATRIAL FIBRILLATION: ICD-10-CM

## 2020-03-20 DIAGNOSIS — E11.9 TYPE 2 DIABETES MELLITUS WITHOUT COMPLICATION, UNSPECIFIED WHETHER LONG TERM INSULIN USE: ICD-10-CM

## 2020-03-24 ENCOUNTER — TELEPHONE (OUTPATIENT)
Dept: FAMILY MEDICINE | Facility: CLINIC | Age: 70
End: 2020-03-24

## 2020-03-27 DIAGNOSIS — E11.9 TYPE 2 DIABETES MELLITUS WITHOUT COMPLICATION: ICD-10-CM

## 2020-04-08 ENCOUNTER — PATIENT OUTREACH (OUTPATIENT)
Dept: ADMINISTRATIVE | Facility: HOSPITAL | Age: 70
End: 2020-04-08

## 2020-05-07 NOTE — PROGRESS NOTES
See evaluation in POC.       GAIL Keita, CCC-SLP  Speech Language Pathologist   5/3/2019    Incoherent speech

## 2020-05-11 DIAGNOSIS — I48.20 CHRONIC ATRIAL FIBRILLATION: ICD-10-CM

## 2020-05-12 RX ORDER — PANTOPRAZOLE SODIUM 40 MG/1
TABLET, DELAYED RELEASE ORAL
Qty: 90 TABLET | Refills: 0 | Status: SHIPPED | OUTPATIENT
Start: 2020-05-12 | End: 2020-09-23

## 2020-05-29 DIAGNOSIS — E11.9 TYPE 2 DIABETES MELLITUS WITHOUT COMPLICATION, WITHOUT LONG-TERM CURRENT USE OF INSULIN: ICD-10-CM

## 2020-06-18 ENCOUNTER — PATIENT OUTREACH (OUTPATIENT)
Dept: ADMINISTRATIVE | Facility: HOSPITAL | Age: 70
End: 2020-06-18

## 2020-07-21 ENCOUNTER — PATIENT OUTREACH (OUTPATIENT)
Dept: ADMINISTRATIVE | Facility: HOSPITAL | Age: 70
End: 2020-07-21

## 2020-09-23 DIAGNOSIS — I48.20 CHRONIC ATRIAL FIBRILLATION: ICD-10-CM

## 2020-09-23 RX ORDER — PANTOPRAZOLE SODIUM 40 MG/1
TABLET, DELAYED RELEASE ORAL
Qty: 90 TABLET | Refills: 1 | Status: SHIPPED | OUTPATIENT
Start: 2020-09-23 | End: 2021-12-01 | Stop reason: SDUPTHER

## 2020-09-30 ENCOUNTER — PATIENT OUTREACH (OUTPATIENT)
Dept: ADMINISTRATIVE | Facility: HOSPITAL | Age: 70
End: 2020-09-30

## 2021-01-04 ENCOUNTER — TELEPHONE (OUTPATIENT)
Dept: ADMINISTRATIVE | Facility: HOSPITAL | Age: 71
End: 2021-01-04

## 2021-03-04 ENCOUNTER — HOSPITAL ENCOUNTER (EMERGENCY)
Facility: HOSPITAL | Age: 71
Discharge: HOME | End: 2021-03-04
Attending: STUDENT IN AN ORGANIZED HEALTH CARE EDUCATION/TRAINING PROGRAM
Payer: MEDICARE

## 2021-03-04 VITALS
WEIGHT: 200 LBS | BODY MASS INDEX: 28 KG/M2 | DIASTOLIC BLOOD PRESSURE: 87 MMHG | RESPIRATION RATE: 16 BRPM | HEART RATE: 89 BPM | HEIGHT: 71 IN | TEMPERATURE: 98 F | SYSTOLIC BLOOD PRESSURE: 113 MMHG | OXYGEN SATURATION: 97 %

## 2021-03-04 DIAGNOSIS — R10.30 LOWER ABDOMINAL PAIN: Primary | ICD-10-CM

## 2021-03-04 DIAGNOSIS — R03.0 ELEVATED BLOOD PRESSURE READING: ICD-10-CM

## 2021-03-04 DIAGNOSIS — R73.09 ELEVATED GLUCOSE: ICD-10-CM

## 2021-03-04 LAB
ALBUMIN SERPL BCP-MCNC: 4.6 G/DL (ref 3.5–5.2)
ALLENS TEST: ABNORMAL
ALP SERPL-CCNC: 57 U/L (ref 55–135)
ALT SERPL W/O P-5'-P-CCNC: 42 U/L (ref 10–44)
ANION GAP SERPL CALC-SCNC: 14 MMOL/L (ref 8–16)
AST SERPL-CCNC: 36 U/L (ref 10–40)
B-OH-BUTYR BLD STRIP-SCNC: 0.3 MMOL/L (ref 0–0.5)
BACTERIA #/AREA URNS HPF: ABNORMAL /HPF
BASOPHILS # BLD AUTO: 0.07 K/UL (ref 0–0.2)
BASOPHILS NFR BLD: 0.6 % (ref 0–1.9)
BILIRUB SERPL-MCNC: 0.7 MG/DL (ref 0.1–1)
BILIRUB UR QL STRIP: NEGATIVE
BNP SERPL-MCNC: 44 PG/ML (ref 0–99)
BUN SERPL-MCNC: 20 MG/DL (ref 8–23)
CALCIUM SERPL-MCNC: 9.7 MG/DL (ref 8.7–10.5)
CHLORIDE SERPL-SCNC: 103 MMOL/L (ref 95–110)
CLARITY UR: ABNORMAL
CO2 SERPL-SCNC: 22 MMOL/L (ref 23–29)
COLOR UR: ABNORMAL
CREAT SERPL-MCNC: 1.2 MG/DL (ref 0.5–1.4)
DELSYS: ABNORMAL
DIFFERENTIAL METHOD: ABNORMAL
EOSINOPHIL # BLD AUTO: 0.1 K/UL (ref 0–0.5)
EOSINOPHIL NFR BLD: 0.4 % (ref 0–8)
ERYTHROCYTE [DISTWIDTH] IN BLOOD BY AUTOMATED COUNT: 12.1 % (ref 11.5–14.5)
EST. GFR  (AFRICAN AMERICAN): >60 ML/MIN/1.73 M^2
EST. GFR  (NON AFRICAN AMERICAN): >60 ML/MIN/1.73 M^2
GLUCOSE SERPL-MCNC: 196 MG/DL (ref 70–110)
GLUCOSE SERPL-MCNC: 267 MG/DL (ref 70–110)
GLUCOSE UR QL STRIP: ABNORMAL
HCO3 UR-SCNC: 30.4 MMOL/L (ref 24–28)
HCT VFR BLD AUTO: 45 % (ref 40–54)
HGB BLD-MCNC: 15.1 G/DL (ref 14–18)
HGB UR QL STRIP: ABNORMAL
HYALINE CASTS #/AREA URNS LPF: 0 /LPF
IMM GRANULOCYTES # BLD AUTO: 0.04 K/UL (ref 0–0.04)
IMM GRANULOCYTES NFR BLD AUTO: 0.3 % (ref 0–0.5)
KETONES UR QL STRIP: ABNORMAL
LEUKOCYTE ESTERASE UR QL STRIP: NEGATIVE
LIPASE SERPL-CCNC: 15 U/L (ref 4–60)
LYMPHOCYTES # BLD AUTO: 1.3 K/UL (ref 1–4.8)
LYMPHOCYTES NFR BLD: 11.4 % (ref 18–48)
MCH RBC QN AUTO: 30 PG (ref 27–31)
MCHC RBC AUTO-ENTMCNC: 33.6 G/DL (ref 32–36)
MCV RBC AUTO: 89 FL (ref 82–98)
MICROSCOPIC COMMENT: ABNORMAL
MODE: ABNORMAL
MONOCYTES # BLD AUTO: 0.7 K/UL (ref 0.3–1)
MONOCYTES NFR BLD: 5.7 % (ref 4–15)
NEUTROPHILS # BLD AUTO: 9.4 K/UL (ref 1.8–7.7)
NEUTROPHILS NFR BLD: 81.6 % (ref 38–73)
NITRITE UR QL STRIP: NEGATIVE
NRBC BLD-RTO: 0 /100 WBC
PCO2 BLDA: 66 MMHG (ref 35–45)
PH SMN: 7.27 [PH] (ref 7.35–7.45)
PH UR STRIP: 6 [PH] (ref 5–8)
PLATELET # BLD AUTO: 241 K/UL (ref 150–350)
PMV BLD AUTO: 10.1 FL (ref 9.2–12.9)
PO2 BLDA: 18 MMHG (ref 40–60)
POC BE: 1 MMOL/L
POC SATURATED O2: 19 % (ref 95–100)
POC TCO2: 32 MMOL/L (ref 24–29)
POTASSIUM SERPL-SCNC: 4.1 MMOL/L (ref 3.5–5.1)
PROT SERPL-MCNC: 8.6 G/DL (ref 6–8.4)
PROT UR QL STRIP: ABNORMAL
RBC # BLD AUTO: 5.04 M/UL (ref 4.6–6.2)
RBC #/AREA URNS HPF: >100 /HPF (ref 0–4)
SAMPLE: ABNORMAL
SITE: ABNORMAL
SODIUM SERPL-SCNC: 139 MMOL/L (ref 136–145)
SP GR UR STRIP: 1.02 (ref 1–1.03)
SP02: 92
SQUAMOUS #/AREA URNS HPF: 1 /HPF
TROPONIN I SERPL DL<=0.01 NG/ML-MCNC: 0.01 NG/ML (ref 0–0.03)
URATE CRY URNS QL MICRO: ABNORMAL
URN SPEC COLLECT METH UR: ABNORMAL
UROBILINOGEN UR STRIP-ACNC: NEGATIVE EU/DL
WBC # BLD AUTO: 11.52 K/UL (ref 3.9–12.7)
WBC #/AREA URNS HPF: 12 /HPF (ref 0–5)
YEAST URNS QL MICRO: ABNORMAL

## 2021-03-04 PROCEDURE — 96374 THER/PROPH/DIAG INJ IV PUSH: CPT

## 2021-03-04 PROCEDURE — 63600175 PHARM REV CODE 636 W HCPCS: Performed by: STUDENT IN AN ORGANIZED HEALTH CARE EDUCATION/TRAINING PROGRAM

## 2021-03-04 PROCEDURE — 82010 KETONE BODYS QUAN: CPT | Performed by: STUDENT IN AN ORGANIZED HEALTH CARE EDUCATION/TRAINING PROGRAM

## 2021-03-04 PROCEDURE — 87086 URINE CULTURE/COLONY COUNT: CPT | Performed by: STUDENT IN AN ORGANIZED HEALTH CARE EDUCATION/TRAINING PROGRAM

## 2021-03-04 PROCEDURE — 93010 ELECTROCARDIOGRAM REPORT: CPT | Mod: ,,, | Performed by: INTERNAL MEDICINE

## 2021-03-04 PROCEDURE — 81000 URINALYSIS NONAUTO W/SCOPE: CPT | Performed by: STUDENT IN AN ORGANIZED HEALTH CARE EDUCATION/TRAINING PROGRAM

## 2021-03-04 PROCEDURE — 25000003 PHARM REV CODE 250: Performed by: STUDENT IN AN ORGANIZED HEALTH CARE EDUCATION/TRAINING PROGRAM

## 2021-03-04 PROCEDURE — 99285 EMERGENCY DEPT VISIT HI MDM: CPT | Mod: 25

## 2021-03-04 PROCEDURE — 80053 COMPREHEN METABOLIC PANEL: CPT | Performed by: STUDENT IN AN ORGANIZED HEALTH CARE EDUCATION/TRAINING PROGRAM

## 2021-03-04 PROCEDURE — 99900035 HC TECH TIME PER 15 MIN (STAT)

## 2021-03-04 PROCEDURE — 96361 HYDRATE IV INFUSION ADD-ON: CPT

## 2021-03-04 PROCEDURE — 93005 ELECTROCARDIOGRAM TRACING: CPT

## 2021-03-04 PROCEDURE — 93010 EKG 12-LEAD: ICD-10-PCS | Mod: ,,, | Performed by: INTERNAL MEDICINE

## 2021-03-04 PROCEDURE — 83880 ASSAY OF NATRIURETIC PEPTIDE: CPT | Performed by: STUDENT IN AN ORGANIZED HEALTH CARE EDUCATION/TRAINING PROGRAM

## 2021-03-04 PROCEDURE — 83690 ASSAY OF LIPASE: CPT | Performed by: STUDENT IN AN ORGANIZED HEALTH CARE EDUCATION/TRAINING PROGRAM

## 2021-03-04 PROCEDURE — 85025 COMPLETE CBC W/AUTO DIFF WBC: CPT | Performed by: STUDENT IN AN ORGANIZED HEALTH CARE EDUCATION/TRAINING PROGRAM

## 2021-03-04 PROCEDURE — 96376 TX/PRO/DX INJ SAME DRUG ADON: CPT

## 2021-03-04 PROCEDURE — 96375 TX/PRO/DX INJ NEW DRUG ADDON: CPT

## 2021-03-04 PROCEDURE — 25500020 PHARM REV CODE 255: Performed by: STUDENT IN AN ORGANIZED HEALTH CARE EDUCATION/TRAINING PROGRAM

## 2021-03-04 PROCEDURE — 84484 ASSAY OF TROPONIN QUANT: CPT | Performed by: STUDENT IN AN ORGANIZED HEALTH CARE EDUCATION/TRAINING PROGRAM

## 2021-03-04 PROCEDURE — 82803 BLOOD GASES ANY COMBINATION: CPT

## 2021-03-04 RX ORDER — MORPHINE SULFATE 4 MG/ML
6 INJECTION, SOLUTION INTRAMUSCULAR; INTRAVENOUS
Status: COMPLETED | OUTPATIENT
Start: 2021-03-04 | End: 2021-03-04

## 2021-03-04 RX ORDER — ONDANSETRON 2 MG/ML
4 INJECTION INTRAMUSCULAR; INTRAVENOUS
Status: COMPLETED | OUTPATIENT
Start: 2021-03-04 | End: 2021-03-04

## 2021-03-04 RX ORDER — METOPROLOL TARTRATE 1 MG/ML
5 INJECTION, SOLUTION INTRAVENOUS
Status: COMPLETED | OUTPATIENT
Start: 2021-03-04 | End: 2021-03-04

## 2021-03-04 RX ORDER — MORPHINE SULFATE 4 MG/ML
9 INJECTION, SOLUTION INTRAMUSCULAR; INTRAVENOUS
Status: DISCONTINUED | OUTPATIENT
Start: 2021-03-04 | End: 2021-03-04

## 2021-03-04 RX ORDER — LABETALOL HYDROCHLORIDE 5 MG/ML
10 INJECTION, SOLUTION INTRAVENOUS
Status: DISCONTINUED | OUTPATIENT
Start: 2021-03-04 | End: 2021-03-04

## 2021-03-04 RX ORDER — LABETALOL HYDROCHLORIDE 5 MG/ML
20 INJECTION, SOLUTION INTRAVENOUS
Status: DISCONTINUED | OUTPATIENT
Start: 2021-03-04 | End: 2021-03-04

## 2021-03-04 RX ADMIN — ONDANSETRON 4 MG: 2 INJECTION INTRAMUSCULAR; INTRAVENOUS at 10:03

## 2021-03-04 RX ADMIN — METOPROLOL TARTRATE 5 MG: 5 INJECTION, SOLUTION INTRAVENOUS at 10:03

## 2021-03-04 RX ADMIN — SODIUM CHLORIDE 1000 ML: 0.9 INJECTION, SOLUTION INTRAVENOUS at 10:03

## 2021-03-04 RX ADMIN — MORPHINE SULFATE 6 MG: 4 INJECTION INTRAVENOUS at 11:03

## 2021-03-04 RX ADMIN — IOHEXOL 100 ML: 350 INJECTION, SOLUTION INTRAVENOUS at 10:03

## 2021-03-04 RX ADMIN — MORPHINE SULFATE 6 MG: 4 INJECTION INTRAVENOUS at 08:03

## 2021-03-06 LAB — BACTERIA UR CULT: NO GROWTH

## 2021-03-17 DIAGNOSIS — E11.9 TYPE 2 DIABETES MELLITUS WITHOUT COMPLICATION, WITHOUT LONG-TERM CURRENT USE OF INSULIN: Primary | ICD-10-CM

## 2021-03-18 ENCOUNTER — TELEPHONE (OUTPATIENT)
Dept: FAMILY MEDICINE | Facility: CLINIC | Age: 71
End: 2021-03-18

## 2021-03-23 DIAGNOSIS — I48.20 CHRONIC ATRIAL FIBRILLATION: ICD-10-CM

## 2021-03-23 RX ORDER — METOPROLOL TARTRATE 25 MG/1
TABLET, FILM COATED ORAL
Qty: 90 TABLET | Refills: 0 | OUTPATIENT
Start: 2021-03-23

## 2021-04-13 PROBLEM — I71.40 ABDOMINAL AORTIC ANEURYSM (AAA) WITHOUT RUPTURE: Status: ACTIVE | Noted: 2021-04-13

## 2021-04-13 PROBLEM — Z86.73 HISTORY OF EMBOLIC STROKE: Status: ACTIVE | Noted: 2019-01-02

## 2021-04-13 PROBLEM — N20.0 KIDNEY STONE: Status: ACTIVE | Noted: 2021-04-13

## 2021-04-14 ENCOUNTER — TELEPHONE (OUTPATIENT)
Dept: FAMILY MEDICINE | Facility: CLINIC | Age: 71
End: 2021-04-14

## 2021-04-14 ENCOUNTER — OFFICE VISIT (OUTPATIENT)
Dept: FAMILY MEDICINE | Facility: CLINIC | Age: 71
End: 2021-04-14
Payer: MEDICARE

## 2021-04-14 VITALS
TEMPERATURE: 99 F | HEIGHT: 69 IN | DIASTOLIC BLOOD PRESSURE: 80 MMHG | SYSTOLIC BLOOD PRESSURE: 120 MMHG | OXYGEN SATURATION: 97 % | HEART RATE: 81 BPM | WEIGHT: 219.13 LBS | BODY MASS INDEX: 32.46 KG/M2

## 2021-04-14 DIAGNOSIS — Z12.11 SCREENING FOR COLON CANCER: ICD-10-CM

## 2021-04-14 DIAGNOSIS — E78.00 PURE HYPERCHOLESTEROLEMIA: ICD-10-CM

## 2021-04-14 DIAGNOSIS — E11.9 TYPE 2 DIABETES MELLITUS WITHOUT COMPLICATION, WITHOUT LONG-TERM CURRENT USE OF INSULIN: Primary | ICD-10-CM

## 2021-04-14 DIAGNOSIS — I50.22 CHRONIC SYSTOLIC HEART FAILURE: ICD-10-CM

## 2021-04-14 DIAGNOSIS — Z86.73 HISTORY OF EMBOLIC STROKE: ICD-10-CM

## 2021-04-14 DIAGNOSIS — R41.3 MEMORY LOSS: ICD-10-CM

## 2021-04-14 DIAGNOSIS — N20.0 KIDNEY STONE: ICD-10-CM

## 2021-04-14 DIAGNOSIS — I48.20 CHRONIC ATRIAL FIBRILLATION: ICD-10-CM

## 2021-04-14 DIAGNOSIS — Z79.01 CHRONIC ANTICOAGULATION: ICD-10-CM

## 2021-04-14 DIAGNOSIS — I71.40 ABDOMINAL AORTIC ANEURYSM (AAA) WITHOUT RUPTURE: ICD-10-CM

## 2021-04-14 PROCEDURE — 1101F PT FALLS ASSESS-DOCD LE1/YR: CPT | Mod: CPTII,S$GLB,, | Performed by: INTERNAL MEDICINE

## 2021-04-14 PROCEDURE — 3008F PR BODY MASS INDEX (BMI) DOCUMENTED: ICD-10-PCS | Mod: CPTII,S$GLB,, | Performed by: INTERNAL MEDICINE

## 2021-04-14 PROCEDURE — 1159F MED LIST DOCD IN RCRD: CPT | Mod: S$GLB,,, | Performed by: INTERNAL MEDICINE

## 2021-04-14 PROCEDURE — 1101F PR PT FALLS ASSESS DOC 0-1 FALLS W/OUT INJ PAST YR: ICD-10-PCS | Mod: CPTII,S$GLB,, | Performed by: INTERNAL MEDICINE

## 2021-04-14 PROCEDURE — 99214 OFFICE O/P EST MOD 30 MIN: CPT | Mod: S$GLB,,, | Performed by: INTERNAL MEDICINE

## 2021-04-14 PROCEDURE — 3288F FALL RISK ASSESSMENT DOCD: CPT | Mod: CPTII,S$GLB,, | Performed by: INTERNAL MEDICINE

## 2021-04-14 PROCEDURE — 1159F PR MEDICATION LIST DOCUMENTED IN MEDICAL RECORD: ICD-10-PCS | Mod: S$GLB,,, | Performed by: INTERNAL MEDICINE

## 2021-04-14 PROCEDURE — 99214 PR OFFICE/OUTPT VISIT, EST, LEVL IV, 30-39 MIN: ICD-10-PCS | Mod: S$GLB,,, | Performed by: INTERNAL MEDICINE

## 2021-04-14 PROCEDURE — 3008F BODY MASS INDEX DOCD: CPT | Mod: CPTII,S$GLB,, | Performed by: INTERNAL MEDICINE

## 2021-04-14 PROCEDURE — 99999 PR PBB SHADOW E&M-EST. PATIENT-LVL V: ICD-10-PCS | Mod: PBBFAC,,, | Performed by: INTERNAL MEDICINE

## 2021-04-14 PROCEDURE — 99499 RISK ADDL DX/OHS AUDIT: ICD-10-PCS | Mod: S$GLB,,, | Performed by: INTERNAL MEDICINE

## 2021-04-14 PROCEDURE — 3288F PR FALLS RISK ASSESSMENT DOCUMENTED: ICD-10-PCS | Mod: CPTII,S$GLB,, | Performed by: INTERNAL MEDICINE

## 2021-04-14 PROCEDURE — 99999 PR PBB SHADOW E&M-EST. PATIENT-LVL V: CPT | Mod: PBBFAC,,, | Performed by: INTERNAL MEDICINE

## 2021-04-14 PROCEDURE — 99499 UNLISTED E&M SERVICE: CPT | Mod: S$GLB,,, | Performed by: INTERNAL MEDICINE

## 2021-04-14 PROCEDURE — 1126F PR PAIN SEVERITY QUANTIFIED, NO PAIN PRESENT: ICD-10-PCS | Mod: S$GLB,,, | Performed by: INTERNAL MEDICINE

## 2021-04-14 PROCEDURE — 1126F AMNT PAIN NOTED NONE PRSNT: CPT | Mod: S$GLB,,, | Performed by: INTERNAL MEDICINE

## 2021-04-14 RX ORDER — METFORMIN HYDROCHLORIDE 500 MG/1
TABLET ORAL
Qty: 120 TABLET | Refills: 5 | Status: SHIPPED | OUTPATIENT
Start: 2021-04-14 | End: 2021-12-01 | Stop reason: SDUPTHER

## 2021-04-14 RX ORDER — ATORVASTATIN CALCIUM 80 MG/1
80 TABLET, FILM COATED ORAL DAILY
Qty: 90 TABLET | Refills: 3 | Status: SHIPPED | OUTPATIENT
Start: 2021-04-14 | End: 2021-12-01 | Stop reason: SDUPTHER

## 2021-04-14 RX ORDER — METOPROLOL SUCCINATE 50 MG/1
50 TABLET, EXTENDED RELEASE ORAL DAILY
Qty: 30 TABLET | Refills: 11 | Status: SHIPPED | OUTPATIENT
Start: 2021-04-14 | End: 2021-12-01 | Stop reason: SDUPTHER

## 2021-04-30 PROCEDURE — G0180 MD CERTIFICATION HHA PATIENT: HCPCS | Mod: ,,, | Performed by: INTERNAL MEDICINE

## 2021-04-30 PROCEDURE — G0180 PR HOME HEALTH MD CERTIFICATION: ICD-10-PCS | Mod: ,,, | Performed by: INTERNAL MEDICINE

## 2021-05-04 ENCOUNTER — TELEPHONE (OUTPATIENT)
Dept: OPHTHALMOLOGY | Facility: CLINIC | Age: 71
End: 2021-05-04

## 2021-05-12 ENCOUNTER — EXTERNAL HOME HEALTH (OUTPATIENT)
Dept: HOME HEALTH SERVICES | Facility: HOSPITAL | Age: 71
End: 2021-05-12
Payer: MEDICARE

## 2021-05-13 ENCOUNTER — TELEPHONE (OUTPATIENT)
Dept: OPHTHALMOLOGY | Facility: CLINIC | Age: 71
End: 2021-05-13

## 2021-06-10 ENCOUNTER — TELEPHONE (OUTPATIENT)
Dept: OPHTHALMOLOGY | Facility: CLINIC | Age: 71
End: 2021-06-10

## 2021-06-14 ENCOUNTER — TELEPHONE (OUTPATIENT)
Dept: OPHTHALMOLOGY | Facility: CLINIC | Age: 71
End: 2021-06-14

## 2021-06-21 ENCOUNTER — TELEPHONE (OUTPATIENT)
Dept: OPHTHALMOLOGY | Facility: CLINIC | Age: 71
End: 2021-06-21

## 2021-08-02 ENCOUNTER — OFFICE VISIT (OUTPATIENT)
Dept: OPHTHALMOLOGY | Facility: CLINIC | Age: 71
End: 2021-08-02
Payer: MEDICARE

## 2021-08-02 DIAGNOSIS — E11.9 DM TYPE 2 WITHOUT RETINOPATHY: ICD-10-CM

## 2021-08-02 DIAGNOSIS — H52.7 REFRACTIVE ERROR: ICD-10-CM

## 2021-08-02 DIAGNOSIS — H40.1133 PRIMARY OPEN ANGLE GLAUCOMA (POAG) OF BOTH EYES, SEVERE STAGE: ICD-10-CM

## 2021-08-02 DIAGNOSIS — H25.13 NUCLEAR SCLEROSIS OF BOTH EYES: Primary | ICD-10-CM

## 2021-08-02 DIAGNOSIS — H26.9 CORTICAL CATARACT OF BOTH EYES: ICD-10-CM

## 2021-08-02 PROCEDURE — 1160F RVW MEDS BY RX/DR IN RCRD: CPT | Mod: CPTII,S$GLB,, | Performed by: OPHTHALMOLOGY

## 2021-08-02 PROCEDURE — 2023F PR DILATED RETINAL EXAM W/O EVID OF RETINOPATHY: ICD-10-PCS | Mod: CPTII,S$GLB,, | Performed by: OPHTHALMOLOGY

## 2021-08-02 PROCEDURE — 99999 PR PBB SHADOW E&M-EST. PATIENT-LVL II: CPT | Mod: PBBFAC,,, | Performed by: OPHTHALMOLOGY

## 2021-08-02 PROCEDURE — 1101F PR PT FALLS ASSESS DOC 0-1 FALLS W/OUT INJ PAST YR: ICD-10-PCS | Mod: CPTII,S$GLB,, | Performed by: OPHTHALMOLOGY

## 2021-08-02 PROCEDURE — 92004 COMPRE OPH EXAM NEW PT 1/>: CPT | Mod: S$GLB,,, | Performed by: OPHTHALMOLOGY

## 2021-08-02 PROCEDURE — 1126F PR PAIN SEVERITY QUANTIFIED, NO PAIN PRESENT: ICD-10-PCS | Mod: CPTII,S$GLB,, | Performed by: OPHTHALMOLOGY

## 2021-08-02 PROCEDURE — 1159F PR MEDICATION LIST DOCUMENTED IN MEDICAL RECORD: ICD-10-PCS | Mod: CPTII,S$GLB,, | Performed by: OPHTHALMOLOGY

## 2021-08-02 PROCEDURE — 3288F PR FALLS RISK ASSESSMENT DOCUMENTED: ICD-10-PCS | Mod: CPTII,S$GLB,, | Performed by: OPHTHALMOLOGY

## 2021-08-02 PROCEDURE — 92004 PR EYE EXAM, NEW PATIENT,COMPREHESV: ICD-10-PCS | Mod: S$GLB,,, | Performed by: OPHTHALMOLOGY

## 2021-08-02 PROCEDURE — 2023F DILAT RTA XM W/O RTNOPTHY: CPT | Mod: CPTII,S$GLB,, | Performed by: OPHTHALMOLOGY

## 2021-08-02 PROCEDURE — 3288F FALL RISK ASSESSMENT DOCD: CPT | Mod: CPTII,S$GLB,, | Performed by: OPHTHALMOLOGY

## 2021-08-02 PROCEDURE — 99999 PR PBB SHADOW E&M-EST. PATIENT-LVL II: ICD-10-PCS | Mod: PBBFAC,,, | Performed by: OPHTHALMOLOGY

## 2021-08-02 PROCEDURE — 99499 RISK ADDL DX/OHS AUDIT: ICD-10-PCS | Mod: S$GLB,,, | Performed by: OPHTHALMOLOGY

## 2021-08-02 PROCEDURE — 1126F AMNT PAIN NOTED NONE PRSNT: CPT | Mod: CPTII,S$GLB,, | Performed by: OPHTHALMOLOGY

## 2021-08-02 PROCEDURE — 1159F MED LIST DOCD IN RCRD: CPT | Mod: CPTII,S$GLB,, | Performed by: OPHTHALMOLOGY

## 2021-08-02 PROCEDURE — 1160F PR REVIEW ALL MEDS BY PRESCRIBER/CLIN PHARMACIST DOCUMENTED: ICD-10-PCS | Mod: CPTII,S$GLB,, | Performed by: OPHTHALMOLOGY

## 2021-08-02 PROCEDURE — 1101F PT FALLS ASSESS-DOCD LE1/YR: CPT | Mod: CPTII,S$GLB,, | Performed by: OPHTHALMOLOGY

## 2021-08-02 PROCEDURE — 99499 UNLISTED E&M SERVICE: CPT | Mod: S$GLB,,, | Performed by: OPHTHALMOLOGY

## 2021-08-02 RX ORDER — LATANOPROST 50 UG/ML
1 SOLUTION/ DROPS OPHTHALMIC NIGHTLY
Qty: 2.5 ML | Refills: 6 | Status: SHIPPED | OUTPATIENT
Start: 2021-08-02 | End: 2021-08-18 | Stop reason: SDUPTHER

## 2021-08-18 ENCOUNTER — CLINICAL SUPPORT (OUTPATIENT)
Dept: OPHTHALMOLOGY | Facility: CLINIC | Age: 71
End: 2021-08-18
Payer: MEDICARE

## 2021-08-18 ENCOUNTER — OFFICE VISIT (OUTPATIENT)
Dept: OPHTHALMOLOGY | Facility: CLINIC | Age: 71
End: 2021-08-18
Payer: MEDICARE

## 2021-08-18 DIAGNOSIS — E11.9 TYPE 2 DIABETES MELLITUS WITHOUT COMPLICATION: ICD-10-CM

## 2021-08-18 DIAGNOSIS — H40.1133 PRIMARY OPEN ANGLE GLAUCOMA (POAG) OF BOTH EYES, SEVERE STAGE: Primary | ICD-10-CM

## 2021-08-18 DIAGNOSIS — H25.13 NUCLEAR SCLEROSIS OF BOTH EYES: ICD-10-CM

## 2021-08-18 DIAGNOSIS — H26.9 CORTICAL CATARACT OF BOTH EYES: ICD-10-CM

## 2021-08-18 PROCEDURE — 3288F FALL RISK ASSESSMENT DOCD: CPT | Mod: CPTII,S$GLB,, | Performed by: OPHTHALMOLOGY

## 2021-08-18 PROCEDURE — 2023F DILAT RTA XM W/O RTNOPTHY: CPT | Mod: CPTII,S$GLB,, | Performed by: OPHTHALMOLOGY

## 2021-08-18 PROCEDURE — 1126F PR PAIN SEVERITY QUANTIFIED, NO PAIN PRESENT: ICD-10-PCS | Mod: CPTII,S$GLB,, | Performed by: OPHTHALMOLOGY

## 2021-08-18 PROCEDURE — 92133 CPTRZD OPH DX IMG PST SGM ON: CPT | Mod: S$GLB,,, | Performed by: OPHTHALMOLOGY

## 2021-08-18 PROCEDURE — 76514 PR  US, EYE, FOR CORNEAL THICKNESS: ICD-10-PCS | Mod: S$GLB,,, | Performed by: OPHTHALMOLOGY

## 2021-08-18 PROCEDURE — 76514 ECHO EXAM OF EYE THICKNESS: CPT | Mod: S$GLB,,, | Performed by: OPHTHALMOLOGY

## 2021-08-18 PROCEDURE — 1160F RVW MEDS BY RX/DR IN RCRD: CPT | Mod: CPTII,S$GLB,, | Performed by: OPHTHALMOLOGY

## 2021-08-18 PROCEDURE — 92012 PR EYE EXAM, EST PATIENT,INTERMED: ICD-10-PCS | Mod: S$GLB,,, | Performed by: OPHTHALMOLOGY

## 2021-08-18 PROCEDURE — 92133 POSTERIOR SEGMENT OCT OPTIC NERVE(OCULAR COHERENCE TOMOGRAPHY) - OU - BOTH EYES: ICD-10-PCS | Mod: S$GLB,,, | Performed by: OPHTHALMOLOGY

## 2021-08-18 PROCEDURE — 1126F AMNT PAIN NOTED NONE PRSNT: CPT | Mod: CPTII,S$GLB,, | Performed by: OPHTHALMOLOGY

## 2021-08-18 PROCEDURE — 3288F PR FALLS RISK ASSESSMENT DOCUMENTED: ICD-10-PCS | Mod: CPTII,S$GLB,, | Performed by: OPHTHALMOLOGY

## 2021-08-18 PROCEDURE — 2023F PR DILATED RETINAL EXAM W/O EVID OF RETINOPATHY: ICD-10-PCS | Mod: CPTII,S$GLB,, | Performed by: OPHTHALMOLOGY

## 2021-08-18 PROCEDURE — 1101F PR PT FALLS ASSESS DOC 0-1 FALLS W/OUT INJ PAST YR: ICD-10-PCS | Mod: CPTII,S$GLB,, | Performed by: OPHTHALMOLOGY

## 2021-08-18 PROCEDURE — 92083 EXTENDED VISUAL FIELD XM: CPT | Mod: S$GLB,,, | Performed by: OPHTHALMOLOGY

## 2021-08-18 PROCEDURE — 99999 PR PBB SHADOW E&M-EST. PATIENT-LVL III: CPT | Mod: PBBFAC,,, | Performed by: OPHTHALMOLOGY

## 2021-08-18 PROCEDURE — 1160F PR REVIEW ALL MEDS BY PRESCRIBER/CLIN PHARMACIST DOCUMENTED: ICD-10-PCS | Mod: CPTII,S$GLB,, | Performed by: OPHTHALMOLOGY

## 2021-08-18 PROCEDURE — 1159F MED LIST DOCD IN RCRD: CPT | Mod: CPTII,S$GLB,, | Performed by: OPHTHALMOLOGY

## 2021-08-18 PROCEDURE — 92083 HUMPHREY VISUAL FIELD - OU - BOTH EYES: ICD-10-PCS | Mod: S$GLB,,, | Performed by: OPHTHALMOLOGY

## 2021-08-18 PROCEDURE — 99999 PR PBB SHADOW E&M-EST. PATIENT-LVL III: ICD-10-PCS | Mod: PBBFAC,,, | Performed by: OPHTHALMOLOGY

## 2021-08-18 PROCEDURE — 1159F PR MEDICATION LIST DOCUMENTED IN MEDICAL RECORD: ICD-10-PCS | Mod: CPTII,S$GLB,, | Performed by: OPHTHALMOLOGY

## 2021-08-18 PROCEDURE — 92012 INTRM OPH EXAM EST PATIENT: CPT | Mod: S$GLB,,, | Performed by: OPHTHALMOLOGY

## 2021-08-18 PROCEDURE — 1101F PT FALLS ASSESS-DOCD LE1/YR: CPT | Mod: CPTII,S$GLB,, | Performed by: OPHTHALMOLOGY

## 2021-08-18 RX ORDER — BRIMONIDINE TARTRATE AND TIMOLOL MALEATE 2; 5 MG/ML; MG/ML
1 SOLUTION OPHTHALMIC 2 TIMES DAILY
Qty: 5 ML | Refills: 6 | Status: SHIPPED | OUTPATIENT
Start: 2021-08-18 | End: 2022-08-18

## 2021-08-18 RX ORDER — LATANOPROST 50 UG/ML
1 SOLUTION/ DROPS OPHTHALMIC NIGHTLY
Qty: 2.5 ML | Refills: 6 | Status: SHIPPED | OUTPATIENT
Start: 2021-08-18 | End: 2024-01-01

## 2021-11-09 ENCOUNTER — TELEPHONE (OUTPATIENT)
Dept: OPHTHALMOLOGY | Facility: CLINIC | Age: 71
End: 2021-11-09
Payer: MEDICARE

## 2021-12-01 ENCOUNTER — LAB VISIT (OUTPATIENT)
Dept: LAB | Facility: HOSPITAL | Age: 71
End: 2021-12-01
Attending: INTERNAL MEDICINE
Payer: MEDICARE

## 2021-12-01 ENCOUNTER — OFFICE VISIT (OUTPATIENT)
Dept: FAMILY MEDICINE | Facility: CLINIC | Age: 71
End: 2021-12-01
Payer: MEDICARE

## 2021-12-01 ENCOUNTER — TELEPHONE (OUTPATIENT)
Dept: FAMILY MEDICINE | Facility: CLINIC | Age: 71
End: 2021-12-01
Payer: MEDICARE

## 2021-12-01 VITALS
WEIGHT: 194.13 LBS | HEART RATE: 66 BPM | TEMPERATURE: 98 F | OXYGEN SATURATION: 96 % | DIASTOLIC BLOOD PRESSURE: 88 MMHG | SYSTOLIC BLOOD PRESSURE: 138 MMHG | BODY MASS INDEX: 28.75 KG/M2 | HEIGHT: 69 IN

## 2021-12-01 DIAGNOSIS — I71.40 ABDOMINAL AORTIC ANEURYSM (AAA) WITHOUT RUPTURE: ICD-10-CM

## 2021-12-01 DIAGNOSIS — Z23 NEEDS FLU SHOT: ICD-10-CM

## 2021-12-01 DIAGNOSIS — R53.81 DEBILITY: Primary | ICD-10-CM

## 2021-12-01 DIAGNOSIS — I50.22 CHRONIC SYSTOLIC HEART FAILURE: ICD-10-CM

## 2021-12-01 DIAGNOSIS — Z12.11 SCREENING FOR COLON CANCER: ICD-10-CM

## 2021-12-01 DIAGNOSIS — R47.01 BROCA'S APHASIA: ICD-10-CM

## 2021-12-01 DIAGNOSIS — I48.20 CHRONIC ATRIAL FIBRILLATION: ICD-10-CM

## 2021-12-01 DIAGNOSIS — E11.9 TYPE 2 DIABETES MELLITUS WITHOUT COMPLICATION, WITHOUT LONG-TERM CURRENT USE OF INSULIN: ICD-10-CM

## 2021-12-01 DIAGNOSIS — E78.00 PURE HYPERCHOLESTEROLEMIA: ICD-10-CM

## 2021-12-01 DIAGNOSIS — Z86.73 HISTORY OF EMBOLIC STROKE: ICD-10-CM

## 2021-12-01 DIAGNOSIS — Z79.01 CHRONIC ANTICOAGULATION: ICD-10-CM

## 2021-12-01 DIAGNOSIS — R41.3 MEMORY LOSS: ICD-10-CM

## 2021-12-01 PROCEDURE — 99499 UNLISTED E&M SERVICE: CPT | Mod: S$GLB,,, | Performed by: INTERNAL MEDICINE

## 2021-12-01 PROCEDURE — 90694 FLU VACCINE - QUADRIVALENT - ADJUVANTED: ICD-10-PCS | Mod: S$GLB,,, | Performed by: INTERNAL MEDICINE

## 2021-12-01 PROCEDURE — 99999 PR PBB SHADOW E&M-EST. PATIENT-LVL IV: ICD-10-PCS | Mod: PBBFAC,,, | Performed by: INTERNAL MEDICINE

## 2021-12-01 PROCEDURE — 90694 VACC AIIV4 NO PRSRV 0.5ML IM: CPT | Mod: S$GLB,,, | Performed by: INTERNAL MEDICINE

## 2021-12-01 PROCEDURE — G0008 FLU VACCINE - QUADRIVALENT - ADJUVANTED: ICD-10-PCS | Mod: S$GLB,,, | Performed by: INTERNAL MEDICINE

## 2021-12-01 PROCEDURE — 99999 PR PBB SHADOW E&M-EST. PATIENT-LVL IV: CPT | Mod: PBBFAC,,, | Performed by: INTERNAL MEDICINE

## 2021-12-01 PROCEDURE — G0008 ADMIN INFLUENZA VIRUS VAC: HCPCS | Mod: S$GLB,,, | Performed by: INTERNAL MEDICINE

## 2021-12-01 PROCEDURE — 99499 RISK ADDL DX/OHS AUDIT: ICD-10-PCS | Mod: S$GLB,,, | Performed by: INTERNAL MEDICINE

## 2021-12-01 PROCEDURE — 99214 OFFICE O/P EST MOD 30 MIN: CPT | Mod: S$GLB,,, | Performed by: INTERNAL MEDICINE

## 2021-12-01 PROCEDURE — 99214 PR OFFICE/OUTPT VISIT, EST, LEVL IV, 30-39 MIN: ICD-10-PCS | Mod: S$GLB,,, | Performed by: INTERNAL MEDICINE

## 2021-12-01 RX ORDER — ATORVASTATIN CALCIUM 80 MG/1
80 TABLET, FILM COATED ORAL DAILY
Qty: 30 TABLET | Refills: 11 | Status: SHIPPED | OUTPATIENT
Start: 2021-12-01 | End: 2024-01-01

## 2021-12-01 RX ORDER — PANTOPRAZOLE SODIUM 40 MG/1
TABLET, DELAYED RELEASE ORAL
Qty: 30 TABLET | Refills: 11 | Status: SHIPPED | OUTPATIENT
Start: 2021-12-01 | End: 2024-01-01

## 2021-12-01 RX ORDER — METOPROLOL SUCCINATE 50 MG/1
50 TABLET, EXTENDED RELEASE ORAL DAILY
Qty: 30 TABLET | Refills: 11 | Status: SHIPPED | OUTPATIENT
Start: 2021-12-01 | End: 2024-01-01

## 2021-12-01 RX ORDER — METFORMIN HYDROCHLORIDE 500 MG/1
500 TABLET ORAL
Qty: 30 TABLET | Refills: 11 | Status: SHIPPED | OUTPATIENT
Start: 2021-12-01 | End: 2024-01-01

## 2021-12-02 ENCOUNTER — TELEPHONE (OUTPATIENT)
Dept: FAMILY MEDICINE | Facility: CLINIC | Age: 71
End: 2021-12-02
Payer: MEDICARE

## 2021-12-02 DIAGNOSIS — E11.9 TYPE 2 DIABETES MELLITUS WITHOUT COMPLICATION, WITHOUT LONG-TERM CURRENT USE OF INSULIN: Primary | ICD-10-CM

## 2021-12-07 RX ORDER — LANCETS
EACH MISCELLANEOUS
Qty: 100 EACH | Refills: 4 | Status: SHIPPED | OUTPATIENT
Start: 2021-12-07 | End: 2024-01-15

## 2021-12-07 RX ORDER — INSULIN PUMP SYRINGE, 3 ML
EACH MISCELLANEOUS
Qty: 1 EACH | Refills: 0 | Status: SHIPPED | OUTPATIENT
Start: 2021-12-07 | End: 2024-01-15 | Stop reason: CLARIF

## 2021-12-07 RX ORDER — INSULIN GLARGINE 100 [IU]/ML
10 INJECTION, SOLUTION SUBCUTANEOUS NIGHTLY
Qty: 5 EACH | Refills: 5 | Status: SHIPPED | OUTPATIENT
Start: 2021-12-07 | End: 2022-12-07

## 2021-12-07 RX ORDER — PEN NEEDLE, DIABETIC 30 GX3/16"
NEEDLE, DISPOSABLE MISCELLANEOUS
Qty: 100 EACH | Refills: 11 | Status: SHIPPED | OUTPATIENT
Start: 2021-12-07 | End: 2024-01-15 | Stop reason: CLARIF

## 2021-12-08 ENCOUNTER — TELEPHONE (OUTPATIENT)
Dept: DIABETES | Facility: CLINIC | Age: 71
End: 2021-12-08
Payer: MEDICARE

## 2021-12-09 ENCOUNTER — TELEPHONE (OUTPATIENT)
Dept: OPHTHALMOLOGY | Facility: CLINIC | Age: 71
End: 2021-12-09
Payer: MEDICARE

## 2022-01-20 ENCOUNTER — TELEPHONE (OUTPATIENT)
Dept: ADMINISTRATIVE | Facility: HOSPITAL | Age: 72
End: 2022-01-20
Payer: MEDICARE

## 2022-01-27 NOTE — TELEPHONE ENCOUNTER
Again tried to contact pt and wife on listed phone #s  [phone] - VM full  [phone] - wife's listed number - left VM

## 2022-02-02 ENCOUNTER — TELEPHONE (OUTPATIENT)
Dept: ADMINISTRATIVE | Facility: HOSPITAL | Age: 72
End: 2022-02-02
Payer: MEDICARE

## 2022-02-11 ENCOUNTER — PES CALL (OUTPATIENT)
Dept: ADMINISTRATIVE | Facility: CLINIC | Age: 72
End: 2022-02-11
Payer: MEDICARE

## 2022-03-28 ENCOUNTER — PATIENT OUTREACH (OUTPATIENT)
Dept: ADMINISTRATIVE | Facility: OTHER | Age: 72
End: 2022-03-28
Payer: MEDICARE

## 2022-03-28 DIAGNOSIS — E11.9 TYPE 2 DIABETES MELLITUS WITHOUT COMPLICATION, WITHOUT LONG-TERM CURRENT USE OF INSULIN: Primary | ICD-10-CM

## 2022-03-28 NOTE — PROGRESS NOTES
Health Maintenance Due   Topic Date Due    TETANUS VACCINE  Never done    Colorectal Cancer Screening  Never done    LDCT Lung Screen  Never done    Shingles Vaccine (1 of 2) Never done    Pneumococcal Vaccines (Age 65+) (1 of 1 - PPSV23) Never done    Foot Exam  02/01/2020    Diabetes Urine Screening  03/20/2020    COVID-19 Vaccine (2 - Booster for Mohamud series) 05/09/2021    Hemoglobin A1c  03/01/2022     Updates were requested from care everywhere.  Chart was reviewed for overdue Proactive [facility] Encounters ([facility]) topics (CRS, Breast Cancer Screening, Eye exam)  Health Maintenance has been updated.  LINKS immunization registry triggered.  Immunizations were reconciled.  Fecal Immunochemical Test (iFOBT)Expected 12/1/2021  A1C ordered.

## 2022-04-05 ENCOUNTER — PES CALL (OUTPATIENT)
Dept: ADMINISTRATIVE | Facility: CLINIC | Age: 72
End: 2022-04-05
Payer: MEDICARE

## 2022-04-07 ENCOUNTER — PATIENT OUTREACH (OUTPATIENT)
Dept: ADMINISTRATIVE | Facility: HOSPITAL | Age: 72
End: 2022-04-07
Payer: MEDICARE

## 2022-05-16 ENCOUNTER — PATIENT OUTREACH (OUTPATIENT)
Dept: ADMINISTRATIVE | Facility: HOSPITAL | Age: 72
End: 2022-05-16
Payer: MEDICARE

## 2022-05-16 DIAGNOSIS — E11.9 TYPE 2 DIABETES MELLITUS WITHOUT COMPLICATION, WITHOUT LONG-TERM CURRENT USE OF INSULIN: Primary | ICD-10-CM

## 2022-06-27 ENCOUNTER — PATIENT OUTREACH (OUTPATIENT)
Dept: ADMINISTRATIVE | Facility: HOSPITAL | Age: 72
End: 2022-06-27
Payer: MEDICARE

## 2022-07-13 ENCOUNTER — PATIENT OUTREACH (OUTPATIENT)
Dept: ADMINISTRATIVE | Facility: HOSPITAL | Age: 72
End: 2022-07-13
Payer: MEDICARE

## 2022-07-27 ENCOUNTER — TELEPHONE (OUTPATIENT)
Dept: OPHTHALMOLOGY | Facility: CLINIC | Age: 72
End: 2022-07-27
Payer: MEDICARE

## 2022-07-28 ENCOUNTER — TELEPHONE (OUTPATIENT)
Dept: OPHTHALMOLOGY | Facility: CLINIC | Age: 72
End: 2022-07-28
Payer: MEDICARE

## 2022-08-08 ENCOUNTER — PES CALL (OUTPATIENT)
Dept: ADMINISTRATIVE | Facility: CLINIC | Age: 72
End: 2022-08-08
Payer: MEDICARE

## 2022-08-11 ENCOUNTER — TELEPHONE (OUTPATIENT)
Dept: OPHTHALMOLOGY | Facility: CLINIC | Age: 72
End: 2022-08-11
Payer: MEDICARE

## 2022-08-18 ENCOUNTER — PATIENT OUTREACH (OUTPATIENT)
Dept: ADMINISTRATIVE | Facility: HOSPITAL | Age: 72
End: 2022-08-18
Payer: MEDICARE

## 2022-09-15 ENCOUNTER — PES CALL (OUTPATIENT)
Dept: ADMINISTRATIVE | Facility: CLINIC | Age: 72
End: 2022-09-15
Payer: MEDICARE

## 2022-09-16 ENCOUNTER — PATIENT OUTREACH (OUTPATIENT)
Dept: ADMINISTRATIVE | Facility: HOSPITAL | Age: 72
End: 2022-09-16
Payer: MEDICARE

## 2022-10-05 ENCOUNTER — PATIENT OUTREACH (OUTPATIENT)
Dept: ADMINISTRATIVE | Facility: HOSPITAL | Age: 72
End: 2022-10-05
Payer: MEDICARE

## 2022-10-05 DIAGNOSIS — E11.9 TYPE 2 DIABETES MELLITUS WITHOUT COMPLICATION, WITHOUT LONG-TERM CURRENT USE OF INSULIN: Primary | ICD-10-CM

## 2022-10-19 ENCOUNTER — PES CALL (OUTPATIENT)
Dept: ADMINISTRATIVE | Facility: CLINIC | Age: 72
End: 2022-10-19
Payer: MEDICARE

## 2022-10-20 ENCOUNTER — PES CALL (OUTPATIENT)
Dept: ADMINISTRATIVE | Facility: OTHER | Age: 72
End: 2022-10-20
Payer: MEDICARE

## 2022-11-07 ENCOUNTER — PES CALL (OUTPATIENT)
Dept: ADMINISTRATIVE | Facility: CLINIC | Age: 72
End: 2022-11-07
Payer: MEDICARE

## 2022-11-19 ENCOUNTER — PES CALL (OUTPATIENT)
Dept: ADMINISTRATIVE | Facility: CLINIC | Age: 72
End: 2022-11-19
Payer: MEDICARE

## 2022-12-20 ENCOUNTER — PES CALL (OUTPATIENT)
Dept: ADMINISTRATIVE | Facility: CLINIC | Age: 72
End: 2022-12-20
Payer: MEDICARE

## 2023-02-01 DIAGNOSIS — E11.9 TYPE 2 DIABETES MELLITUS WITHOUT COMPLICATION, WITHOUT LONG-TERM CURRENT USE OF INSULIN: ICD-10-CM

## 2023-02-01 DIAGNOSIS — E11.9 TYPE 2 DIABETES MELLITUS WITHOUT COMPLICATION: ICD-10-CM

## 2023-02-06 ENCOUNTER — PATIENT OUTREACH (OUTPATIENT)
Dept: ADMINISTRATIVE | Facility: HOSPITAL | Age: 73
End: 2023-02-06
Payer: MEDICARE

## 2023-03-27 ENCOUNTER — PES CALL (OUTPATIENT)
Dept: ADMINISTRATIVE | Facility: CLINIC | Age: 73
End: 2023-03-27
Payer: MEDICARE

## 2023-04-03 ENCOUNTER — PES CALL (OUTPATIENT)
Dept: ADMINISTRATIVE | Facility: CLINIC | Age: 73
End: 2023-04-03
Payer: MEDICARE

## 2023-04-27 ENCOUNTER — PES CALL (OUTPATIENT)
Dept: ADMINISTRATIVE | Facility: CLINIC | Age: 73
End: 2023-04-27
Payer: MEDICARE

## 2023-05-24 ENCOUNTER — PES CALL (OUTPATIENT)
Dept: ADMINISTRATIVE | Facility: CLINIC | Age: 73
End: 2023-05-24
Payer: MEDICARE

## 2023-06-27 ENCOUNTER — PATIENT OUTREACH (OUTPATIENT)
Dept: ADMINISTRATIVE | Facility: HOSPITAL | Age: 73
End: 2023-06-27
Payer: MEDICARE

## 2023-06-27 DIAGNOSIS — E11.9 TYPE 2 DIABETES MELLITUS WITHOUT COMPLICATION, WITHOUT LONG-TERM CURRENT USE OF INSULIN: Primary | ICD-10-CM

## 2023-07-05 ENCOUNTER — PES CALL (OUTPATIENT)
Dept: ADMINISTRATIVE | Facility: CLINIC | Age: 73
End: 2023-07-05
Payer: MEDICARE

## 2023-08-22 ENCOUNTER — PATIENT OUTREACH (OUTPATIENT)
Dept: ADMINISTRATIVE | Facility: HOSPITAL | Age: 73
End: 2023-08-22
Payer: MEDICARE

## 2023-11-02 NOTE — PROGRESS NOTES
[facility]  Physical Therapy Neurological Rehabilitation Initial Evaluation    Name: Kamar Doshi  Clinic Number: [id number]    Therapy Diagnosis:   Encounter Diagnoses   Name Primary?    Poor balance     Right leg weakness Yes     Physician: Bethel Harley MD    Physician Orders: PT Eval   Medical Diagnosis from Referral: Cerebrovascular accident (CVA) due to embolism of left middle cerebral artery and gait difficulty  Evaluation Date: 5/21/2019  Authorization Period Expiration: 4/08/2020  Plan of Care Expiration: 8/21/2019  Visit # / Visits authorized: 1/ 1    Time In: 800 am  Time Out: 845 am  Total Billable Time: 45 minutes    Precautions: Standard, Fall and CVA    Subjective   Date of onset: Jan 1, 2019  History of current condition - Kamar reports: Pt's wife is main historian due to pt's aphasia. She reports they spent 22 days in the hospital in Jan. Prior to admit pt was fully independent. He has received home health PT prior to this and has been awaiting to be seen here.     Medical History:   Past Medical History:   Diagnosis Date    Atrial fibrillation     Clotting disorder     Hyperlipidemia     Hypertension     Stroke     Vision loss 01/01/2019       Surgical History:   Kamar Doshi  has a past surgical history that includes Brain surgery (01/01/2018).    Medications:   Kamar has a current medication list which includes the following prescription(s): apixaban, atorvastatin, docusate sodium, metformin, metoprolol tartrate, nicotine, nicotine, and pantoprazole.    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  Prior Level of Function: ind  Current Level of Function: mod I with increased time for completion and drags R LE    Pain:  Current 0/10, worst 0/10, best 0/10   Location: n/a  Description: n/a  Aggravating Factors: n/a  Easing Factors: n/a    Pts goals: "I want to get this side stronger" re: R side    Objective     Observation: pleasant and cooperative    Posture Alignment :forward head    ROM:   GROSS AROM/PROM  UPPER EXTREMITY  (R) UE: limited as follows: pt struggles to AROM bring R UE above 90 degrees  (L) UE: WFLs  LOWER EXTREMITY  (R) LE: WFLs  (L) LE: WFLs       Lower Extremity Strength  Right LE  Left LE    Hip flexion:  4/5 Hip flexion: 5/5   Knee extension: 4-/5 Knee extension: 5/5   Knee flexion: 4-/5 Knee flexion: 5/5   Ankle dorsiflexion:  3+/5 Ankle dorsiflexion: 5/5   Ankle plantarflexion:  4-/5 Ankle plantarflexion: 5/5   Hip abduction: 3-/5 Hip abduction: 4/5         Hip extension: 2/5 Hip extension: 3/5     Upper extremity strength:     Right Left   Shoulder Flexion: 3+/5 5/5   Shoulder Abduction: 4/5 5/5             Elbow Flexion: 4/5 5/5   Elbow Extension: 4-/5 5/5                    Functional Strength:   -30 sec sit to stand: 6 (fall risk, decreased functional LE strength)     Bed mobility: mod I    Transfers: mod I    Stairs: pt uses B handrails and reciprocal gait pattern with mod I     TU.4 seconds      BREWSTER Assessment  1. Sitting to Standing: 3 - able to stand independely using hands  2. Standing Unsupported: 4 - able to stand safely 2 minutes without hold  3. Sitting Unsupported: 4 - able to sit safely and securely 2 minutes  4. Standing to Sittin - sits safely with minimal use of hands  5. Pivot Transfer: 3 - able to transfer safely with definite use of hands  6. Standing with Eyes Closed: 4 - albe to stand 10 seconds safely  7. Standing with Feet Together: 4 - able to place feet together independently and stand 1 minute safely  8. Reaching Forward with " Outstretched Arm: 3 - can reach forward 12 cm/5 inches safely  9. Retrieving Object from Floor: 3 - able to pick slipper but needs supervision  10. Turning to Look Behind: 4 - looks behind from both sides and weights shifts well  11. Turning 360 Degrees: 3 - able to trun 360 safely one side only in 4 seconds or less  12. Placing Alternate Foot on Step: 4 - able to stand independently safely and complete 8 steps in 20 seconds  13. Standing with One Foot in Front: 0 - Looses balance while stepping or standing  14. Standing on One Foot: 1 - tries to lift leg and unable to hold 3 seconds but remains standing independently  Total: 44  Maximum: 56  (high fall risk)      Coordination:   Point to Point:    -Finger to Nose: mild impairment in R UE    Balance Assessment:-Single Leg Stance:    Right : <2 seconds  (fall risk)   Left:  5 seconds (fall risk)    Sensation: pt reports numbness and tingling but no sensory deficits notes to light touch    Gait Assessment:   · AD used: no AD; Assistance: mod I; Distance: approximately 100'    · GAIT DEVIATIONS:   Kamar displays the following deviations with ambulation: slightly decreased step length with L LE due to decreased weight bearing on R LE, decreased B arm swing   Impairments contributing to deviations: impaired motor control, decreased weight bearing on R LE, decreased balance in SLS      PT Evaluation Completed? Yes        CMS Impairment/Limitation/Restriction for FOTO Intake Survey    Therapist reviewed FOTO scores for Kamar Doshi on 5/21/2019.   FOTO documents entered into [employer] - see Media section.    Limitation Score: 54%  Category: Mobility    Current : CK = at least 40% but < 60% impaired, limited or restricted  Goal: CJ = at least 20% but < 40% impaired, limited or restricted  Discharge: CK = at least 40% but < 60% impaired, limited or restricted         TREATMENT   Treatment Time In: 835 am  Treatment Time Out: 845 am  Total Treatment time separate from  Evaluation: 10 minutes    Kamar received therapeutic exercises to develop strength and endurance for 10 minutes including:  +standing hip abduction RTB 3x15  +standing hip extension RTB 3x15  +standing heel raises, 3x15  +LAQ    Kamar participated in neuromuscular re-education activities to improve: Balance, Coordination and Posture for 0 minutes. The following activities were included:      Kamar participated in gait training to improve functional mobility and safety for 0  minutes, including:           Home Exercises and Patient Education Provided    Education provided:   - written HEP provided    Written Home Exercises Provided: yes.  Exercises were reviewed and Kamar was able to demonstrate them prior to the end of the session.  Kamar demonstrated fair  understanding of the education provided.     See EMR under Patient Instructions for exercises provided 5/21/2019.    Assessment   Kamar is a 68 y.o. male referred to outpatient Physical Therapy with a medical diagnosis of CVA due to embolism of L MCA. Pt presents to PT with the following impairments leading to his functional decline: decreased endurance, decreased balance, decreased strength in R LE and UE, decreased coordination, aphasia, and overall decreased mobility. Pt demonstrates some lack of insight into his deficits and he will require further education on safety with functional tasks. He scored a 44 on Richter Balance Assessment placing him at a high risk for fall and further warranting his need for skilled PT services. Pt amiable and motivated to return to [facility]. He appears to have good support, as pt's spouse present throughout evaluation.    Pt prognosis is Good.   Pt will benefit from skilled outpatient Physical Therapy to address the deficits stated above and in the chart below, provide pt/family education, and to maximize pt's level of independence.     Plan of care discussed with patient: Yes  Pt's spiritual, cultural and educational needs  considered and patient is agreeable to the plan of care and goals as stated below:     Anticipated Barriers for therapy: aphasia, therefore understanding of teaching/ carryover between sessions    Medical Necessity is demonstrated by the following  History  Co-morbidities and personal factors that may impact the plan of care Co-morbidities:   HTN and atrial fibriliation    Personal Factors:   age     low   Examination  Body Structures and Functions, activity limitations and participation restrictions that may impact the plan of care Body Regions:   lower extremities  upper extremities    Body Systems:    gross symmetry  strength  gross coordinated movement  balance  gait  motor control    Participation Restrictions:   Pt requires increased time for completion of all tasks.    Activity limitations:   Learning and applying knowledge  solving problems    General Tasks and Commands  undertaking a single task    Communication  communicating with/receiving spoken language    Mobility  lifting and carrying objects  fine hand use (grasping/picking up)  walking  driving (bike, car, motorcycle)    Self care  looking after one's health    Domestic Life  cooking  doing house work (cleaning house, washing dishes, laundry)  assisting others    Interactions/Relationships  complex interpersonal interactions    Life Areas  no deficits    Community and Social Life  community life  recreation and leisure         moderate   Clinical Presentation stable and uncomplicated low   Decision Making/ Complexity Score: low     Goals:  Short Term Goals to be achieved in 4 weeks:   1. Patient will demonstrate safety with ambulation tasks.   2. Patient will demonstrate independence with HEP.    3. Patient will increase ambulation distance to 300' with no incidence of R LE toe drag.    Long Term Goals to be achieved in 8 weeks:   1. Pt will improve SLS time to 10 seconds on B LE with supervision in order to increase step length in gait.  2. Pt will  increase score on BBS to 49 in order to decrease risk for fall.   3. Pt will decrease score on TUG to 8 seconds to better align him with normative for his age and gender.      Plan   Plan of care Certification: 5/21/2019 to 8/21/2019.    Outpatient Physical Therapy 1 times weekly for 8 weeks to include the following interventions: Electrical Stimulation possible use of Bioness if warranted, Gait Training, Manual Therapy, Neuromuscular Re-ed, Patient Education, Therapeutic Activites and Therapeutic Exercise.     Harrison Vicente, PT     Ambulatory

## 2023-12-31 ENCOUNTER — HOSPITAL ENCOUNTER (OUTPATIENT)
Facility: HOSPITAL | Age: 73
Discharge: HOME | End: 2024-01-01
Attending: EMERGENCY MEDICINE | Admitting: HOSPITALIST
Payer: MEDICARE

## 2023-12-31 DIAGNOSIS — Z79.01 CHRONIC ANTICOAGULATION: ICD-10-CM

## 2023-12-31 DIAGNOSIS — R53.1 RIGHT SIDED WEAKNESS: ICD-10-CM

## 2023-12-31 DIAGNOSIS — H40.1133 PRIMARY OPEN ANGLE GLAUCOMA (POAG) OF BOTH EYES, SEVERE STAGE: ICD-10-CM

## 2023-12-31 DIAGNOSIS — R47.1 DYSARTHRIA: ICD-10-CM

## 2023-12-31 DIAGNOSIS — R55 SYNCOPE: ICD-10-CM

## 2023-12-31 DIAGNOSIS — I48.20 CHRONIC ATRIAL FIBRILLATION: ICD-10-CM

## 2023-12-31 DIAGNOSIS — Z86.73 HISTORY OF EMBOLIC STROKE: ICD-10-CM

## 2023-12-31 DIAGNOSIS — E11.9 TYPE 2 DIABETES MELLITUS WITHOUT COMPLICATION, WITHOUT LONG-TERM CURRENT USE OF INSULIN: ICD-10-CM

## 2023-12-31 DIAGNOSIS — E78.00 PURE HYPERCHOLESTEROLEMIA: ICD-10-CM
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
## 2023-12-31 DIAGNOSIS — Z86.73 HISTORY OF STROKE: ICD-10-CM

## 2023-12-31 DIAGNOSIS — R42 LIGHTHEADEDNESS: ICD-10-CM

## 2023-12-31 DIAGNOSIS — R07.9 CHEST PAIN: ICD-10-CM

## 2023-12-31 LAB
ALBUMIN SERPL BCP-MCNC: 4.4 G/DL (ref 3.5–5.2)
ALLENS TEST: ABNORMAL
ALP SERPL-CCNC: 79 U/L (ref 55–135)
ALT SERPL W/O P-5'-P-CCNC: 14 U/L (ref 10–44)
ANION GAP SERPL CALC-SCNC: 13 MMOL/L (ref 8–16)
ANION GAP SERPL CALC-SCNC: 14 MMOL/L (ref 8–16)
AST SERPL-CCNC: 19 U/L (ref 10–40)
BASOPHILS # BLD AUTO: 0.05 K/UL (ref 0–0.2)
BASOPHILS NFR BLD: 0.7 % (ref 0–1.9)
BILIRUB SERPL-MCNC: 0.6 MG/DL (ref 0.1–1)
BUN SERPL-MCNC: 10 MG/DL (ref 6–30)
BUN SERPL-MCNC: 9 MG/DL (ref 8–23)
CALCIUM SERPL-MCNC: 10.1 MG/DL (ref 8.7–10.5)
CHLORIDE SERPL-SCNC: 101 MMOL/L (ref 95–110)
CHLORIDE SERPL-SCNC: 102 MMOL/L (ref 95–110)
CHOLEST SERPL-MCNC: 198 MG/DL (ref 120–199)
CHOLEST/HDLC SERPL: 5.4 {RATIO} (ref 2–5)
CO2 SERPL-SCNC: 25 MMOL/L (ref 23–29)
CREAT SERPL-MCNC: 0.8 MG/DL (ref 0.5–1.4)
CREAT SERPL-MCNC: 1.1 MG/DL (ref 0.5–1.4)
DIFFERENTIAL METHOD BLD: NORMAL
EOSINOPHIL # BLD AUTO: 0.1 K/UL (ref 0–0.5)
EOSINOPHIL NFR BLD: 1.9 % (ref 0–8)
ERYTHROCYTE [DISTWIDTH] IN BLOOD BY AUTOMATED COUNT: 12.2 % (ref 11.5–14.5)
EST. GFR  (NO RACE VARIABLE): >60 ML/MIN/1.73 M^2
GLUCOSE SERPL-MCNC: 264 MG/DL (ref 70–110)
GLUCOSE SERPL-MCNC: 275 MG/DL (ref 70–110)
HCT VFR BLD AUTO: 49.4 % (ref 40–54)
HCT VFR BLD CALC: 55 %PCV (ref 36–54)
HDLC SERPL-MCNC: 37 MG/DL (ref 40–75)
HDLC SERPL: 18.7 % (ref 20–50)
HGB BLD-MCNC: 16.1 G/DL (ref 14–18)
IMM GRANULOCYTES # BLD AUTO: 0.02 K/UL (ref 0–0.04)
IMM GRANULOCYTES NFR BLD AUTO: 0.3 % (ref 0–0.5)
INR PPP: 1.1 (ref 0.8–1.2)
LDLC SERPL CALC-MCNC: 97.2 MG/DL (ref 63–159)
LYMPHOCYTES # BLD AUTO: 2.2 K/UL (ref 1–4.8)
LYMPHOCYTES NFR BLD: 31.1 % (ref 18–48)
MCH RBC QN AUTO: 29.7 PG (ref 27–31)
MCHC RBC AUTO-ENTMCNC: 32.6 G/DL (ref 32–36)
MCV RBC AUTO: 91 FL (ref 82–98)
MONOCYTES # BLD AUTO: 0.6 K/UL (ref 0.3–1)
MONOCYTES NFR BLD: 8.4 % (ref 4–15)
NEUTROPHILS # BLD AUTO: 4.1 K/UL (ref 1.8–7.7)
NEUTROPHILS NFR BLD: 57.6 % (ref 38–73)
NONHDLC SERPL-MCNC: 161 MG/DL
NRBC BLD-RTO: 0 /100 WBC
PLATELET # BLD AUTO: 263 K/UL (ref 150–450)
PMV BLD AUTO: 9.5 FL (ref 9.2–12.9)
POC IONIZED CALCIUM: 1.07 MMOL/L (ref 1.06–1.42)
POC TCO2 (MEASURED): 29 MMOL/L (ref 23–29)
POCT GLUCOSE: 243 MG/DL (ref 70–110)
POCT GLUCOSE: 253 MG/DL (ref 70–110)
POCT GLUCOSE: 293 MG/DL (ref 70–110)
POTASSIUM BLD-SCNC: 3.4 MMOL/L (ref 3.5–5.1)
POTASSIUM SERPL-SCNC: 3.6 MMOL/L (ref 3.5–5.1)
PROT SERPL-MCNC: 9.3 G/DL (ref 6–8.4)
PROTHROMBIN TIME: 11.4 SEC (ref 9–12.5)
RBC # BLD AUTO: 5.43 M/UL (ref 4.6–6.2)
SAMPLE: ABNORMAL
SITE: ABNORMAL
SODIUM BLD-SCNC: 139 MMOL/L (ref 136–145)
SODIUM SERPL-SCNC: 140 MMOL/L (ref 136–145)
T4 FREE SERPL-MCNC: 0.88 NG/DL (ref 0.71–1.51)
TRIGL SERPL-MCNC: 319 MG/DL (ref 30–150)
TROPONIN I SERPL DL<=0.01 NG/ML-MCNC: <0.006 NG/ML (ref 0–0.03)
TSH SERPL DL<=0.005 MIU/L-ACNC: 11.46 UIU/ML (ref 0.4–4)
WBC # BLD AUTO: 7.18 K/UL (ref 3.9–12.7)

## 2023-12-31 PROCEDURE — 99900035 HC TECH TIME PER 15 MIN (STAT)

## 2023-12-31 PROCEDURE — G0378 HOSPITAL OBSERVATION PER HR: HCPCS

## 2023-12-31 PROCEDURE — 84439 ASSAY OF FREE THYROXINE: CPT | Performed by: EMERGENCY MEDICINE

## 2023-12-31 PROCEDURE — 84295 ASSAY OF SERUM SODIUM: CPT

## 2023-12-31 PROCEDURE — 82330 ASSAY OF CALCIUM: CPT

## 2023-12-31 PROCEDURE — 85025 COMPLETE CBC W/AUTO DIFF WBC: CPT | Performed by: EMERGENCY MEDICINE

## 2023-12-31 PROCEDURE — 93005 ELECTROCARDIOGRAM TRACING: CPT

## 2023-12-31 PROCEDURE — 99285 EMERGENCY DEPT VISIT HI MDM: CPT | Mod: 25

## 2023-12-31 PROCEDURE — 93005 ELECTROCARDIOGRAM TRACING: CPT | Performed by: INTERNAL MEDICINE

## 2023-12-31 PROCEDURE — 63600175 PHARM REV CODE 636 W HCPCS: Performed by: PHYSICIAN ASSISTANT

## 2023-12-31 PROCEDURE — 96372 THER/PROPH/DIAG INJ SC/IM: CPT | Performed by: PHYSICIAN ASSISTANT

## 2023-12-31 PROCEDURE — 84443 ASSAY THYROID STIM HORMONE: CPT | Performed by: EMERGENCY MEDICINE

## 2023-12-31 PROCEDURE — 84484 ASSAY OF TROPONIN QUANT: CPT | Performed by: PHYSICIAN ASSISTANT

## 2023-12-31 PROCEDURE — 80061 LIPID PANEL: CPT | Performed by: EMERGENCY MEDICINE

## 2023-12-31 PROCEDURE — 93010 ELECTROCARDIOGRAM REPORT: CPT | Mod: ,,, | Performed by: INTERNAL MEDICINE

## 2023-12-31 PROCEDURE — 85014 HEMATOCRIT: CPT

## 2023-12-31 PROCEDURE — 85610 PROTHROMBIN TIME: CPT | Performed by: EMERGENCY MEDICINE

## 2023-12-31 PROCEDURE — 82565 ASSAY OF CREATININE: CPT

## 2023-12-31 PROCEDURE — 82962 GLUCOSE BLOOD TEST: CPT

## 2023-12-31 PROCEDURE — 96374 THER/PROPH/DIAG INJ IV PUSH: CPT

## 2023-12-31 PROCEDURE — 80053 COMPREHEN METABOLIC PANEL: CPT | Performed by: EMERGENCY MEDICINE

## 2023-12-31 PROCEDURE — 25000003 PHARM REV CODE 250: Performed by: EMERGENCY MEDICINE

## 2023-12-31 PROCEDURE — G0408 INPT/TELE FOLLOW UP 35: HCPCS | Mod: 95,,, | Performed by: STUDENT IN AN ORGANIZED HEALTH CARE EDUCATION/TRAINING PROGRAM

## 2023-12-31 PROCEDURE — 84132 ASSAY OF SERUM POTASSIUM: CPT | Mod: 91

## 2023-12-31 RX ORDER — METOPROLOL TARTRATE 50 MG/1
50 TABLET ORAL
Status: COMPLETED | OUTPATIENT
Start: 2023-12-31 | End: 2023-12-31

## 2023-12-31 RX ORDER — IBUPROFEN 200 MG
16 TABLET ORAL
Status: DISCONTINUED | OUTPATIENT
Start: 2023-12-31 | End: 2024-01-01 | Stop reason: HOSPADM

## 2023-12-31 RX ORDER — ATORVASTATIN CALCIUM 40 MG/1
80 TABLET, FILM COATED ORAL DAILY
Status: DISCONTINUED | OUTPATIENT
Start: 2024-01-01 | End: 2024-01-01 | Stop reason: HOSPADM

## 2023-12-31 RX ORDER — ACETAMINOPHEN 325 MG/1
650 TABLET ORAL EVERY 4 HOURS PRN
Status: DISCONTINUED | OUTPATIENT
Start: 2023-12-31 | End: 2024-01-01 | Stop reason: HOSPADM

## 2023-12-31 RX ORDER — SODIUM CHLORIDE 0.9 % (FLUSH) 0.9 %
10 SYRINGE (ML) INJECTION
Status: DISCONTINUED | OUTPATIENT
Start: 2023-12-31 | End: 2024-01-01 | Stop reason: HOSPADM

## 2023-12-31 RX ORDER — METOPROLOL SUCCINATE 50 MG/1
50 TABLET, EXTENDED RELEASE ORAL DAILY
Status: DISCONTINUED | OUTPATIENT
Start: 2024-01-01 | End: 2024-01-01 | Stop reason: HOSPADM

## 2023-12-31 RX ORDER — INSULIN ASPART 100 [IU]/ML
0-5 INJECTION, SOLUTION INTRAVENOUS; SUBCUTANEOUS
Status: DISCONTINUED | OUTPATIENT
Start: 2023-12-31 | End: 2024-01-01 | Stop reason: HOSPADM

## 2023-12-31 RX ORDER — NALOXONE HCL 0.4 MG/ML
0.02 VIAL (ML) INJECTION
Status: DISCONTINUED | OUTPATIENT
Start: 2023-12-31 | End: 2024-01-01 | Stop reason: HOSPADM

## 2023-12-31 RX ORDER — GLUCAGON 1 MG
1 KIT INJECTION
Status: DISCONTINUED | OUTPATIENT
Start: 2023-12-31 | End: 2024-01-01 | Stop reason: HOSPADM

## 2023-12-31 RX ORDER — LANOLIN ALCOHOL/MO/W.PET/CERES
800 CREAM (GRAM) TOPICAL
Status: DISCONTINUED | OUTPATIENT
Start: 2023-12-31 | End: 2024-01-01 | Stop reason: HOSPADM

## 2023-12-31 RX ORDER — METOPROLOL TARTRATE 1 MG/ML
5 INJECTION, SOLUTION INTRAVENOUS EVERY 5 MIN PRN
Status: DISCONTINUED | OUTPATIENT
Start: 2023-12-31 | End: 2024-01-01 | Stop reason: HOSPADM

## 2023-12-31 RX ORDER — TALC
6 POWDER (GRAM) TOPICAL NIGHTLY PRN
Status: DISCONTINUED | OUTPATIENT
Start: 2023-12-31 | End: 2024-01-01 | Stop reason: HOSPADM

## 2023-12-31 RX ORDER — SODIUM CHLORIDE 0.9 % (FLUSH) 0.9 %
10 SYRINGE (ML) INJECTION EVERY 8 HOURS
Status: DISCONTINUED | OUTPATIENT
Start: 2023-12-31 | End: 2023-12-31

## 2023-12-31 RX ORDER — AMOXICILLIN 250 MG
1 CAPSULE ORAL DAILY PRN
Status: DISCONTINUED | OUTPATIENT
Start: 2023-12-31 | End: 2024-01-01 | Stop reason: HOSPADM

## 2023-12-31 RX ORDER — IBUPROFEN 200 MG
24 TABLET ORAL
Status: DISCONTINUED | OUTPATIENT
Start: 2023-12-31 | End: 2024-01-01 | Stop reason: HOSPADM

## 2023-12-31 RX ADMIN — INSULIN ASPART 1 UNITS: 100 INJECTION, SOLUTION INTRAVENOUS; SUBCUTANEOUS at 11:12

## 2023-12-31 RX ADMIN — METOROPROLOL TARTRATE 5 MG: 5 INJECTION, SOLUTION INTRAVENOUS at 04:12

## 2023-12-31 RX ADMIN — APIXABAN 5 MG: 5 TABLET, FILM COATED ORAL at 04:12

## 2023-12-31 RX ADMIN — METOPROLOL TARTRATE 50 MG: 50 TABLET, FILM COATED ORAL at 04:12

## 2023-12-31 RX ADMIN — METOROPROLOL TARTRATE 5 MG: 5 INJECTION, SOLUTION INTRAVENOUS at 05:12

## 2023-12-31 NOTE — SUBJECTIVE & OBJECTIVE
"Past Medical History:   Diagnosis Date    Atrial fibrillation     Clotting disorder     Hyperlipidemia     Hypertension     Stroke     Vision loss 01/01/2019       Past Surgical History:   Procedure Laterality Date    BRAIN SURGERY  01/01/2018       Review of patient's allergies indicates:  No Known Allergies    No current facility-administered medications on file prior to encounter.     Current Outpatient Medications on File Prior to Encounter   Medication Sig    apixaban (ELIQUIS) 5 mg Tab Take 1 tablet (5 mg total) by mouth 2 (two) times daily.    atorvastatin (LIPITOR) 80 MG tablet Take 1 tablet (80 mg total) by mouth once daily. Take 1 tab by mouth every night    blood sugar diagnostic Strp To check BG 1 times daily, to use with insurance preferred meter    blood-glucose meter kit To check BG 1 times daily, to use with insurance preferred meter    brimonidine-timoloL (COMBIGAN) 0.2-0.5 % Drop Place 1 drop into the left eye 2 (two) times a day.    insulin (LANTUS SOLOSTAR U-100 INSULIN) glargine 100 units/mL (3mL) SubQ pen Inject 10 Units into the skin every evening.    lancets Misc To check BG 1 times daily, to use with insurance preferred meter    latanoprost 0.005 % ophthalmic solution Place 1 drop into both eyes nightly.    metFORMIN (GLUCOPHAGE) 500 MG tablet Take 1 tablet (500 mg total) by mouth daily with breakfast.    metoprolol succinate (TOPROL-XL) 50 MG 24 hr tablet Take 1 tablet (50 mg total) by mouth once daily.    pantoprazole (PROTONIX) 40 MG tablet TAKE 1 TABLET BY MOUTH ONCE DAILY IN THE MORNING BEFORE BREAKFAST    pen needle, diabetic 32 gauge x 5/32" Ndle Daily insulin administration     Family History       Problem Relation (Age of Onset)    Alcohol abuse Brother    Arthritis Mother, Father    COPD Brother    Heart disease Father    No Known Problems Sister, Sister    Peripheral vascular disease Sister          Tobacco Use    Smoking status: Former     Current packs/day: 0.00     Average " packs/day: 1 pack/day for 56.0 years (56.0 ttl pk-yrs)     Types: Cigarettes     Start date:      Quit date: 2019     Years since quittin.0    Smokeless tobacco: Never   Substance and Sexual Activity    Alcohol use: No    Drug use: No    Sexual activity: Never     Review of Systems   Constitutional:  Negative for chills and fever.   HENT:  Negative for nosebleeds and tinnitus.    Eyes:  Negative for photophobia and visual disturbance.   Respiratory:  Negative for shortness of breath and wheezing.    Cardiovascular:  Negative for chest pain, palpitations and leg swelling.   Gastrointestinal:  Negative for abdominal distention, nausea and vomiting.   Genitourinary:  Negative for dysuria, flank pain and hematuria.   Musculoskeletal:  Negative for gait problem and joint swelling.   Skin:  Negative for rash and wound.   Neurological:  Positive for light-headedness. Negative for seizures and syncope.     Objective:     Vital Signs (Most Recent):  Temp: 96.5 °F (35.8 °C) (23 1654)  Pulse: 98 (23 1701)  Resp: 20 (23 170)  BP: (!) 172/96 (23 1654)  SpO2: 97 % (23 170) Vital Signs (24h Range):  Temp:  [96.5 °F (35.8 °C)] 96.5 °F (35.8 °C)  Pulse:  [] 98  Resp:  [15-23] 20  SpO2:  [87 %-97 %] 97 %  BP: (131-172)/(92-96) 172/96     Weight: 104.3 kg (230 lb)  Body mass index is 31.19 kg/m².     Physical Exam  Vitals and nursing note reviewed.   Constitutional:       General: He is not in acute distress.     Appearance: He is well-developed. He is not diaphoretic.   HENT:      Head: Normocephalic and atraumatic.      Right Ear: External ear normal.      Left Ear: External ear normal.   Eyes:      General:         Right eye: No discharge.         Left eye: No discharge.      Conjunctiva/sclera: Conjunctivae normal.   Neck:      Thyroid: No thyromegaly.   Cardiovascular:      Rate and Rhythm: Normal rate. Rhythm irregular.      Heart sounds: No murmur heard.  Pulmonary:      Effort:  "Pulmonary effort is normal. No respiratory distress.      Breath sounds: Normal breath sounds.   Abdominal:      General: Bowel sounds are normal. There is no distension.      Palpations: Abdomen is soft. There is no mass.      Tenderness: There is no abdominal tenderness.   Musculoskeletal:         General: No deformity.      Cervical back: Normal range of motion and neck supple.      Right lower leg: No edema.      Left lower leg: No edema.   Skin:     General: Skin is warm and dry.   Neurological:      Mental Status: He is alert.      Sensory: No sensory deficit.      Comments: Symmetric movement of BUE and BLE against gravity. Some intermittent expressive aphasia   Psychiatric:         Mood and Affect: Mood normal.         Behavior: Behavior normal.                Significant Labs: CBC:   Recent Labs   Lab 12/31/23  1447 12/31/23  1500   WBC 7.18  --    HGB 16.1  --    HCT 49.4 55*     --      CMP:   Recent Labs   Lab 12/31/23  1447      K 3.6      CO2 25   *   BUN 9   CREATININE 1.1   CALCIUM 10.1   PROT 9.3*   ALBUMIN 4.4   BILITOT 0.6   ALKPHOS 79   AST 19   ALT 14   ANIONGAP 14     Cardiac Markers: No results for input(s): "CKMB", "MYOGLOBIN", "BNP", "TROPISTAT" in the last 48 hours.  TSH:   Recent Labs   Lab 12/31/23  1447   TSH 11.456*       Significant Imaging:   Imaging Results              CT Head Without Contrast (Final result)  Result time 12/31/23 14:55:17      Final result by Kevin Whitlock MD (12/31/23 14:55:17)                   Impression:      1. Suspected encephalomalacia accounts for low attenuation within the left basal ganglia.  No previous examinations are available for comparison.  Superimposed infarct cannot be entirely excluded, correlation with current symptomatology advised.  Further evaluation as warranted.  2. Sequela of chronic microvascular ischemic change and senescent change.      Electronically signed by: Kevin Whitlock, " MD  Date:    12/31/2023  Time:    14:55               Narrative:    EXAMINATION:  CT HEAD WITHOUT CONTRAST    CLINICAL HISTORY:  Neuro deficit, acute, stroke suspected;    TECHNIQUE:  Low dose axial images were obtained through the head.  Coronal and sagittal reformations were also performed. Contrast was not administered.    COMPARISON:  None.    FINDINGS:  There is generalized cerebral volume loss.  There is hypoattenuation in a periventricular fashion, likely sequela of chronic microvascular ischemic change.  There is no evidence of acute hemorrhage, or mass.  There is suspected encephalomalacia involving the left basal ganglia extending to the left inferior temporal region.  There is no hydrocephalus.  There are no abnormal extra-axial fluid collections.  The paranasal sinuses and mastoid air cells are clear, and there is no evidence of calvarial fracture.  The visualized soft tissues are unremarkable.

## 2023-12-31 NOTE — ED PROVIDER NOTES
Encounter Date: 12/31/2023    SCRIBE #1 NOTE: I, Aleyda James, am scribing for, and in the presence of,  David Duran MD. I have scribed the following portions of the note - Other sections scribed: HPI, ROS.       History     Chief Complaint   Patient presents with    Weakness     Ems called to 72yo male that wife stated got very weak and lightheaded and she helped him to the ground. Symptoms started about 1325. Patient complaining of R sided weakness and has speech deficit. MD Duran to activate stroke alert. Per EMS EKG, patient in A-fib RVR.     This is a 73 year old male, with a PMHx of Atrial fibrillation, Clotting disorder, HLD, HTN, and CVA, who presents to the ED via EMS for Evaluation of Weakness, symptoms onset PTA at 1:25 pm. Patient states he is not on medication or Eliquis. Additional history obtained from independent historian: Per EMS personnel, the patient reports right sided weakness. Also, per EMS personnel, the wife states the patient became weak and lightheaded while bumping between the walls and bed when he was assisted to the floor, later became more oriented and was able to sit in his wheelchair; all occurring within 40 minutes. Patient denies cough, shortness of breath, chest pain, fever, chills, abdominal pain, nausea, vomiting, diarrhea, dysuria, headaches, congestion, sore throat, arm or leg trouble, eye pain, ear pain, rash, speech difficulty, vision disturbance, or other associated symptoms. No other alleviating or exacerbating factors. This is the extent of the patient's complaints in the ED. NKDA.             The history is provided by the patient, the spouse and the EMS personnel. No  was used.     Review of patient's allergies indicates:  No Known Allergies  Past Medical History:   Diagnosis Date    Atrial fibrillation     Clotting disorder     Hyperlipidemia     Hypertension     Stroke     Vision loss 01/01/2019     Past Surgical History:   Procedure  Laterality Date    BRAIN SURGERY  2018     Family History   Problem Relation Age of Onset    Arthritis Mother     Arthritis Father     Heart disease Father     No Known Problems Sister     COPD Brother     No Known Problems Sister     Peripheral vascular disease Sister     Alcohol abuse Brother      Social History     Tobacco Use    Smoking status: Former     Current packs/day: 0.00     Average packs/day: 1 pack/day for 56.0 years (56.0 ttl pk-yrs)     Types: Cigarettes     Start date:      Quit date: 2019     Years since quittin.0    Smokeless tobacco: Never   Substance Use Topics    Alcohol use: No    Drug use: No     Review of Systems   Constitutional:  Negative for chills, diaphoresis and fever.   HENT:  Negative for congestion, ear pain and sore throat.    Eyes:  Negative for pain and visual disturbance.   Respiratory:  Negative for cough and shortness of breath.    Cardiovascular:  Negative for chest pain.   Gastrointestinal:  Negative for abdominal pain, diarrhea, nausea and vomiting.   Genitourinary:  Negative for dysuria.   Musculoskeletal:  Negative for myalgias.   Skin:  Negative for rash.   Neurological:  Positive for weakness (right sided). Negative for speech difficulty and headaches.       Physical Exam     Initial Vitals   BP Pulse Resp Temp SpO2   23 1434 23 1434 23 1434 23 1654 23 1434   (!) 131/92 (!) 116 20 96.5 °F (35.8 °C) 96 %      MAP       --                Physical Exam  The patient was examined specifically for the following:   General:No significant distress, Good color, Warm and dry. Head and neck:Scalp atraumatic, Neck supple. Neurological:Appropriate conversation, Gross motor deficits. Eyes:Conjugate gaze, Clear corneas. ENT: No epistaxis. Cardiac: Regular rate and rhythm, Grossly normal heart tones. Pulmonary: Wheezing, Rales. Gastrointestinal: Abdominal tenderness, Abdominal distention. Musculoskeletal: Extremity deformity, Apparent pain  with range of motion of the joints. Skin: Rash.   The findings on examination were normal except for the following:  The patient has some slight aphasia or dysarthria.  He has very slight right upper extremity weakness.  He has more profound right lower extremity weakness.  He can not hold his right leg off the stretcher for 5 seconds.  He seems to have very poor hygiene.  Lungs are clear.  The heart tones are normal.  The abdomen is soft.  Extremities are nontender there is no apparent pain with range of motion any joints.  The patient reports he has taking no medications.  He has not taking Eliquis.  ED Course   Procedures  Labs Reviewed   COMPREHENSIVE METABOLIC PANEL - Abnormal; Notable for the following components:       Result Value    Glucose 275 (*)     Total Protein 9.3 (*)     All other components within normal limits   TSH - Abnormal; Notable for the following components:    TSH 11.456 (*)     All other components within normal limits   LIPID PANEL - Abnormal; Notable for the following components:    Triglycerides 319 (*)     HDL 37 (*)     HDL/Cholesterol Ratio 18.7 (*)     Total Cholesterol/HDL Ratio 5.4 (*)     All other components within normal limits   POCT GLUCOSE - Abnormal; Notable for the following components:    POCT Glucose 243 (*)     All other components within normal limits   ISTAT PROCEDURE - Abnormal; Notable for the following components:    POC Glucose 264 (*)     POC Potassium 3.4 (*)     POC Hematocrit 55 (*)     All other components within normal limits   POCT GLUCOSE - Abnormal; Notable for the following components:    POCT Glucose 253 (*)     All other components within normal limits   CBC W/ AUTO DIFFERENTIAL   PROTIME-INR   T4, FREE   HEMOGLOBIN A1C   TROPONIN I   POCT GLUCOSE, HAND-HELD DEVICE   ISTAT CHEM8   POCT PROTIME-INR   POCT GLUCOSE MONITORING CONTINUOUS     EKG Readings: (Independently Interpreted)   The patient is in atrial fibrillation with a heart rate of 121.  There  is a right bundle branch block pattern.  There are no significant ST segment or T-wave changes.  There are nonspecific ST segment and T-wave changes.  There is no evidence of acute myocardial infarction or malignant arrhythmia.       Imaging Results              CT Head Without Contrast (Final result)  Result time 12/31/23 14:55:17      Final result by Kevin Whitlock MD (12/31/23 14:55:17)                   Impression:      1. Suspected encephalomalacia accounts for low attenuation within the left basal ganglia.  No previous examinations are available for comparison.  Superimposed infarct cannot be entirely excluded, correlation with current symptomatology advised.  Further evaluation as warranted.  2. Sequela of chronic microvascular ischemic change and senescent change.      Electronically signed by: Kevin Whitlock MD  Date:    12/31/2023  Time:    14:55               Narrative:    EXAMINATION:  CT HEAD WITHOUT CONTRAST    CLINICAL HISTORY:  Neuro deficit, acute, stroke suspected;    TECHNIQUE:  Low dose axial images were obtained through the head.  Coronal and sagittal reformations were also performed. Contrast was not administered.    COMPARISON:  None.    FINDINGS:  There is generalized cerebral volume loss.  There is hypoattenuation in a periventricular fashion, likely sequela of chronic microvascular ischemic change.  There is no evidence of acute hemorrhage, or mass.  There is suspected encephalomalacia involving the left basal ganglia extending to the left inferior temporal region.  There is no hydrocephalus.  There are no abnormal extra-axial fluid collections.  The paranasal sinuses and mastoid air cells are clear, and there is no evidence of calvarial fracture.  The visualized soft tissues are unremarkable.                                       Medications   apixaban tablet 5 mg (5 mg Oral Given 12/31/23 1641)   metoprolol injection 5 mg (5 mg Intravenous Given 12/31/23 1701)   atorvastatin tablet  80 mg (has no administration in time range)   metoprolol succinate (TOPROL-XL) 24 hr tablet 50 mg (has no administration in time range)   glucose chewable tablet 16 g (has no administration in time range)   glucose chewable tablet 24 g (has no administration in time range)   glucagon (human recombinant) injection 1 mg (has no administration in time range)   insulin aspart U-100 pen 0-5 Units (has no administration in time range)   melatonin tablet 6 mg (has no administration in time range)   senna-docusate 8.6-50 mg per tablet 1 tablet (has no administration in time range)   acetaminophen tablet 650 mg (has no administration in time range)   naloxone 0.4 mg/mL injection 0.02 mg (has no administration in time range)   magnesium oxide tablet 800 mg (has no administration in time range)   magnesium oxide tablet 800 mg (has no administration in time range)   sodium chloride 0.9% flush 10 mL (has no administration in time range)   metoprolol tartrate (LOPRESSOR) tablet 50 mg (50 mg Oral Given 12/31/23 1640)     Medical Decision Making  Amount and/or Complexity of Data Reviewed  Labs: ordered.  Radiology: ordered.    Risk  OTC drugs.  Prescription drug management.    Given the above, this patient presents emergency room with a history of a fall just prior to arrival.  There were reports that the patient just veered off to the left side and then fell to the ground.  The patient has a history of a stroke with some right-sided weakness.  A stroke code was called.  A CT scan of the head fails to reveal any intracranial bleeding.  Neurology feels that this patient should have his Eliquis restarted.  He was not taking it.  Patient has a history of atrial fibrillation.  Neurology further recommends that the patient has a CT scan in 24 hours.  The patient's wife reported to the neurologist that the patient is at his neurologic baseline.  I noted some slightly slurred speech on initial evaluation with some right-sided weakness.   It is suggested that this is all old.    The patient is in atrial fibrillation.  I will attempt to control his rate.  I believe he has been noncompliant with his medicines.    The patient was treated with oral metoprolol as well as IV metoprolol.  The patient's heart rate fell to 86 after this treatment.  His rate is nicely controlled.  I will admit him for CT scan in 24 hours at the recommendation of Neurology.  We concluded the patient is basically at neurologic baseline.  I will defer further treatment to hospital medicine.         Scribe Attestation:   Scribe #1: I performed the above scribed service and the documentation accurately describes the services I performed. I attest to the accuracy of the note.           Please note that the documentation on this chart was provided by the scribe above on the date of service noted above, and that the documentation in the chart accurately reflects the work and decisions made by me alone.  Signed, Dr. Duran                    Clinical Impression:  Final diagnoses:  [W19.XXXA] Fall, initial encounter (Primary)  [R53.1] Right sided weakness  [R47.1] Dysarthria  [Z86.73] History of stroke  [R07.9] Chest pain  [R55] Syncope          ED Disposition Condition    Observation Stable                David Duran MD  12/31/23 [id number]

## 2023-12-31 NOTE — HPI
Kamar Doshi 73 y.o. male with a history of HTN, T2DM, HLD, Afib, CHF, L MCA stroke s/p tPA and EVT for M1 occlusion (Jan 2019) presents to the hospital with a chief complaint of lightheadedness. Per chart review patient has history of right sided weakness and aphasia after CVA.  He reports today he experienced sudden-onset weakness and lightheadedness described as the walls closing at.  His wife called him and assisted him to the ground.  He reports he now feels at his baseline.  He reports he has not taken his medications in many years.  He is unsure of medical history.  He denies fever chest pain nausea vomiting abdominal pain leg swelling melena hematuria hematemesis dizziness and syncope.  Further history is limited as he has a poor historian.    Attempts were made to reach the patient's wife via phone number without success.    In the ED, seen by vascular Neurology not a candidate for tPA recommending to resume Eliquis the wife was present during vascular neurology evaluation reported his speech and strength were at baseline CT head without acute infarct initially AFib RVR rates near 130 rate controlled to 90 afebrile without leukocytosis.

## 2023-12-31 NOTE — SUBJECTIVE & OBJECTIVE
HPI:  73 y.o. male with a history of HTN, T2DM, HLD, Afib, CHF, L MCA stroke s/p tPA and EVT for M1 occlusion (Jan 2019) with baseline right sided weakness and aphasia presents after a fall.  Wife states that his strength and speech are at baseline.    Hasn't been taking his Eliquis for many months.    /92       Images personally reviewed and interpreted:  Study: Head CT  Study Interpretation: Encephalomalacia in the left BG, subinsular cortex and temporal lobe     Assessment and plan:  72 y/o with multiple vascular risk factors including Afib (not compliant with AC) who presented after a fall.  His exam is at baseline  - confirmed by his wife  Not a candidate for tPa as he is at baseline. Would recommend a repeat CT in 24 hrs to assess to make sure he doesn't have any new areas of ischemia.    Resume Eliquis as soon as possible. If cost is an issue, consider Warfarin. [place] stress the importance of medication compliance and high risk of recurrent disabling (possibly fatal) stroke if he doesn't take his medication.    Lytics recommendation: Thrombolytic therapy not recommended due to Patient back to neurological baseline  Thrombectomy recommendation: No; at this time symptoms not suggestive of large vessel occlusion  Placement recommendation: admit to inpatient

## 2023-12-31 NOTE — ED TRIAGE NOTES
Pt presents to the Er c/o of fall. Pt wife stated he was extremely week and she was unable to pick him up.Pt has hx of a cva and his neuro status is at baseline from previous stroke. Pt oriented to person and situation only. Pt denies any pain or injury from all.

## 2023-12-31 NOTE — TELEMEDICINE CONSULT
[facility]  Vascular Neurology  Comprehensive Stroke Center  TeleVascular Neurology Acute Consultation Note        Consult Information  Consult to Telemedicine - Acute Stroke  Consult performed by: eRji Braswell MD  Consult ordered by: Jefe Duran MD          Consulting Provider: JEFE DURAN.   Current Providers  No providers found    Patient Location:  [facility] EMERGENCY DEPARTMENT Emergency Department    Spoke hospital nurse at bedside with patient assisting consultant.  Patient information was obtained from spouse/SO, past medical records, and ER records.       Stroke Documentation  Acute Stroke Times   Last Known Normal Date: 12/31/23  Last Known Normal Time: 1400  Stroke Team Called Date: 12/31/23  Stroke Team Called Time: 1436  Stroke Team Arrival Date: 12/31/23  Stroke Team Arrival Time: 1446    NIH Scale:  1a. Level of Consciousness: 0-->Alert, keenly responsive  1b. LOC Questions: 1-->Answers one question correctly  1c. LOC Commands: 0-->Performs both tasks correctly  2. Best Gaze: 0-->Normal  3. Visual: 0-->No visual loss  4. Facial Palsy: 1-->Minor paralysis (flattened nasolabial fold, asymmetry on smiling)  5a. Motor Arm, Left: 0-->No drift, limb holds 90 (or 45) degrees for full 10 secs  5b. Motor Arm, Right: 0-->No drift, limb holds 90 (or 45) degrees for full 10 secs  6a. Motor Leg, Left: 0-->No drift, leg holds 30 degree position for full 5 secs  6b. Motor Leg, Right: 0-->No drift, leg holds 30 degree position for full 5 secs  7. Limb Ataxia: 0-->Absent  8. Sensory: 0-->Normal, no sensory loss  9. Best Language: 1-->Mild-to-moderate aphasia, some obvious loss of fluency or facility of comprehension, without significant limitation on ideas expressed or form of expression. Reduction of speech and/or comprehension, however, makes conversation. . . (see row details)  10. Dysarthria: 1-->Mild-to-moderate dysarthria, patient slurs at least some words and, at worst,  can be understood with some difficulty  11. Extinction and Inattention (formerly Neglect): 0-->No abnormality  Total (NIH Stroke Scale): 4      Modified [place]: Score: 1  [place] Coma Scale:     ABCD2 Score:    [id number] Score:    HAS -BLED Score:    ICH Score:    [place] & [place] Classification:      Blood pressure (!) 131/92, pulse (!) 130, resp. rate 20, height 6' (1.829 m), weight 104.3 kg (230 lb), SpO2 (!) 94 %.    Van Negative    Diagnoses  Problem Noted   Fall 12/31/2023   History of embolic stroke L MCA; left basal ganglia; hospitalization [facility] 1/2019 1/2/2019    Cerebral perfusion study with contrast (01/01/2019):  IMPRESSION:   Left MCA infarct with 30-40% of the infarct territory representing core. This predominantly involves the frontal lobe.      CT angiogram of the head (01/01/2019):  IMPRESSION:   1. 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  2. Severe stenosis in the proximal right internal carotid artery.    MRI of the brain without contrast (01/02/2019):   IMPRESSION:   Recent hemorrhagic infarct centered in the left basal ganglia, involving the left putamen, left globus pallidus, left internal capsule, left caudate head, and the medial aspect of the left temporal lobe. Associated mass affect with partial effacement of the left lateral ventricle. Findings are similar to the most recent CT head. Consider follow-up CT head to evaluate for interval change.  Evidence of patchy bilateral chronic white matter ischemia.  Mild generalized cerebral atrophy.    CT of the head without contrast (01/02/2019):  IMPRESSION:   Impression:  1.   Evolving recent left basal ganglia/left caudate head ischemia now with new patchy hemorrhagic conversion in the left basal ganglia. There is some mild localized mass effect with slight new effacement of the left frontal horn of the lateral ventricle and mild increased prominence of the right lateral ventricle although without overt hydrocephalus at this  time. New 1-2 mm left to right midline shift.   2.   Background cerebral atrophy and chronic microangiopathy.         Medical Decision Making  HPI:  73 y.o. male with a history of HTN, T2DM, HLD, Afib, CHF, L MCA stroke s/p tPA and EVT for M1 occlusion (Jan 2019) with baseline right sided weakness and aphasia presents after a fall.  Wife states that his strength and speech are at baseline.    Hasn't been taking his Eliquis for many months.    /92       Images personally reviewed and interpreted:  Study: Head CT  Study Interpretation: Encephalomalacia in the left BG, subinsular cortex and temporal lobe     Assessment and plan:  74 y/o with multiple vascular risk factors including Afib (not compliant with AC) who presented after a fall.  His exam is at baseline  - confirmed by his wife  Not a candidate for tPa as he is at baseline. Would recommend a repeat CT in 24 hrs to assess to make sure he doesn't have any new areas of ischemia.    Resume Eliquis as soon as possible. If cost is an issue, consider Warfarin. [place] stress the importance of medication compliance and high risk of recurrent disabling (possibly fatal) stroke if he doesn't take his medication.    Lytics recommendation: Thrombolytic therapy not recommended due to Patient back to neurological baseline  Thrombectomy recommendation: No; at this time symptoms not suggestive of large vessel occlusion  Placement recommendation: admit to inpatient               ROS  Physical Exam  Neurological:      Comments: Follows simple commands.  Repetition intact.  Naming and fluency impaired  No drift appreciable.       Past Medical History:   Diagnosis Date    Atrial fibrillation     Clotting disorder     Hyperlipidemia     Hypertension     Stroke     Vision loss 01/01/2019     Past Surgical History:   Procedure Laterality Date    BRAIN SURGERY  01/01/2018     Family History   Problem Relation Age of Onset    Arthritis Mother     Arthritis Father     Heart  disease Father     No Known Problems Sister     COPD Brother     No Known Problems Sister     Peripheral vascular disease Sister     Alcohol abuse Brother            Reji Braswell MD      Emergent/Acute neurological consultation requested by spoke provider due to critical concerns for possible cerebrovascular event that could result in permanent loss of neurologic/bodily function, severe disability or death of this patient.  Immediate/timely evaluation by a highly prepared expert is paramount for optimal outcomes  High risk for neurological deterioration if not properly diagnosed  High risk for neurological deterioration if not treated promplty/as soon as possible  Complex diagnostic evaluation may be required (advanced imaging)  High risk treatment options (thrombolytics and/or thrombectomy)    Patient care was coordinated with spoke provider, including but not limted to    Discussing likely diagnosis/etiology of symptoms  Making recommendations for further diagnostic studies  Making recommendations for intravenous thrombolytics or other advanced therapies  Making recommendations for disposition (admission/transfer for higher level of care)

## 2023-12-31 NOTE — ADMISSIONCARE
AdmissionCare    Guideline: Transient Ischemic Attack (TIA) - OBS, Observation    Based on the indications selected for the patient, the bed status of Admit to Observation was determined to be MET    The following indications were selected as present at the time of evaluation of the patient:      - Appropriate evaluation (brain MRI, echocardiogram, carotid imaging) cannot be completed in emergency department time frame (3 to 4 hours).    AdmissionCare documentation entered by: Priscila Guillen    [facility] [employer], 27th edition, Copyright © 2023 [facility] [employer], [facility] All Rights Reserved.  [id number]

## 2024-01-01 ENCOUNTER — TELEPHONE (OUTPATIENT)
Dept: HEPATOLOGY | Facility: HOSPITAL | Age: 74
End: 2024-01-01

## 2024-01-01 VITALS
RESPIRATION RATE: 20 BRPM | DIASTOLIC BLOOD PRESSURE: 94 MMHG | TEMPERATURE: 99 F | HEART RATE: 62 BPM | BODY MASS INDEX: 24.48 KG/M2 | OXYGEN SATURATION: 97 % | HEIGHT: 72 IN | SYSTOLIC BLOOD PRESSURE: 135 MMHG | WEIGHT: 180.75 LBS

## 2024-01-01 LAB
ALBUMIN SERPL BCP-MCNC: 3.7 G/DL (ref 3.5–5.2)
ALP SERPL-CCNC: 65 U/L (ref 55–135)
ALT SERPL W/O P-5'-P-CCNC: 16 U/L (ref 10–44)
ANION GAP SERPL CALC-SCNC: 11 MMOL/L (ref 8–16)
ASCENDING AORTA: 3.23 CM
AST SERPL-CCNC: 15 U/L (ref 10–40)
AV INDEX (PROSTH): 0.51
AV MEAN GRADIENT: 2 MMHG
AV PEAK GRADIENT: 3 MMHG
AV VALVE AREA BY VELOCITY RATIO: 1.96 CM²
AV VALVE AREA: 1.64 CM²
AV VELOCITY RATIO: 0.6
BASOPHILS # BLD AUTO: 0.05 K/UL (ref 0–0.2)
BASOPHILS NFR BLD: 0.6 % (ref 0–1.9)
BILIRUB SERPL-MCNC: 0.8 MG/DL (ref 0.1–1)
BSA FOR ECHO PROCEDURE: 2.3 M2
BUN SERPL-MCNC: 10 MG/DL (ref 8–23)
CALCIUM SERPL-MCNC: 9.3 MG/DL (ref 8.7–10.5)
CHLORIDE SERPL-SCNC: 104 MMOL/L (ref 95–110)
CO2 SERPL-SCNC: 24 MMOL/L (ref 23–29)
CREAT SERPL-MCNC: 1 MG/DL (ref 0.5–1.4)
CV ECHO LV RWT: 0.5 CM
DIFFERENTIAL METHOD BLD: NORMAL
DOP CALC AO PEAK VEL: 0.91 M/S
DOP CALC AO VTI: 14.8 CM
DOP CALC LVOT AREA: 3.2 CM2
DOP CALC LVOT DIAMETER: 2.03 CM
DOP CALC LVOT PEAK VEL: 0.55 M/S
DOP CALC LVOT STROKE VOLUME: 24.26 CM3
DOP CALCLVOT PEAK VEL VTI: 7.5 CM
ECHO LV POSTERIOR WALL: 1.39 CM (ref 0.6–1.1)
EOSINOPHIL # BLD AUTO: 0.1 K/UL (ref 0–0.5)
EOSINOPHIL NFR BLD: 0.6 % (ref 0–8)
ERYTHROCYTE [DISTWIDTH] IN BLOOD BY AUTOMATED COUNT: 12.1 % (ref 11.5–14.5)
EST. GFR  (NO RACE VARIABLE): >60 ML/MIN/1.73 M^2
ESTIMATED AVG GLUCOSE: 295 MG/DL (ref 68–131)
FRACTIONAL SHORTENING: 16 % (ref 28–44)
GLUCOSE SERPL-MCNC: 278 MG/DL (ref 70–110)
HBA1C MFR BLD: 11.9 % (ref 4–5.6)
HCT VFR BLD AUTO: 44.3 % (ref 40–54)
HGB BLD-MCNC: 14.6 G/DL (ref 14–18)
IMM GRANULOCYTES # BLD AUTO: 0.02 K/UL (ref 0–0.04)
IMM GRANULOCYTES NFR BLD AUTO: 0.2 % (ref 0–0.5)
INTERVENTRICULAR SEPTUM: 1.08 CM (ref 0.6–1.1)
IVC DIAMETER: 1.8 CM
IVRT: 75.64 MSEC
LA MAJOR: 6.81 CM
LA MINOR: 7.18 CM
LA WIDTH: 4.5 CM
LEFT ATRIUM SIZE: 4.6 CM
LEFT ATRIUM VOLUME INDEX: 60.3 ML/M2
LEFT ATRIUM VOLUME: 122.99 CM3
LEFT INTERNAL DIMENSION IN SYSTOLE: 4.64 CM (ref 2.1–4)
LEFT VENTRICLE DIASTOLIC VOLUME INDEX: 72.49 ML/M2
LEFT VENTRICLE DIASTOLIC VOLUME: 147.87 ML
LEFT VENTRICLE MASS INDEX: 139 G/M2
LEFT VENTRICLE SYSTOLIC VOLUME INDEX: 48.6 ML/M2
LEFT VENTRICLE SYSTOLIC VOLUME: 99.11 ML
LEFT VENTRICULAR INTERNAL DIMENSION IN DIASTOLE: 5.51 CM (ref 3.5–6)
LEFT VENTRICULAR MASS: 284.22 G
LVOT MG: 0.73 MMHG
LVOT MV: 0.41 CM/S
LYMPHOCYTES # BLD AUTO: 2.5 K/UL (ref 1–4.8)
LYMPHOCYTES NFR BLD: 29.8 % (ref 18–48)
MCH RBC QN AUTO: 30 PG (ref 27–31)
MCHC RBC AUTO-ENTMCNC: 33 G/DL (ref 32–36)
MCV RBC AUTO: 91 FL (ref 82–98)
MONOCYTES # BLD AUTO: 0.6 K/UL (ref 0.3–1)
MONOCYTES NFR BLD: 7.8 % (ref 4–15)
MV PEAK E VEL: 0.9 M/S
NEUTROPHILS # BLD AUTO: 5 K/UL (ref 1.8–7.7)
NEUTROPHILS NFR BLD: 61 % (ref 38–73)
NRBC BLD-RTO: 0 /100 WBC
PISA TR MAX VEL: 2.06 M/S
PLATELET # BLD AUTO: 247 K/UL (ref 150–450)
PMV BLD AUTO: 9.5 FL (ref 9.2–12.9)
POCT GLUCOSE: 223 MG/DL (ref 70–110)
POCT GLUCOSE: 261 MG/DL (ref 70–110)
POTASSIUM SERPL-SCNC: 4 MMOL/L (ref 3.5–5.1)
PROT SERPL-MCNC: 7.6 G/DL (ref 6–8.4)
PV PEAK GRADIENT: 2 MMHG
PV PEAK VELOCITY: 0.63 M/S
RA MAJOR: 6.74 CM
RA PRESSURE ESTIMATED: 15 MMHG
RA WIDTH: 4.3 CM
RBC # BLD AUTO: 4.87 M/UL (ref 4.6–6.2)
RIGHT VENTRICULAR END-DIASTOLIC DIMENSION: 3.91 CM
RV TB RVSP: 17 MMHG
RV TISSUE DOPPLER FREE WALL SYSTOLIC VELOCITY 1 (APICAL 4 CHAMBER VIEW): 10.37 CM/S
SINUS: 3.8 CM
SODIUM SERPL-SCNC: 139 MMOL/L (ref 136–145)
STJ: 2.42 CM
TR MAX PG: 17 MMHG
TRICUSPID ANNULAR PLANE SYSTOLIC EXCURSION: 1.37 CM
TROPONIN I SERPL DL<=0.01 NG/ML-MCNC: 0.01 NG/ML (ref 0–0.03)
TV REST PULMONARY ARTERY PRESSURE: 32 MMHG
WBC # BLD AUTO: 8.22 K/UL (ref 3.9–12.7)
Z-SCORE OF LEFT VENTRICULAR DIMENSION IN END DIASTOLE: -0.99
Z-SCORE OF LEFT VENTRICULAR DIMENSION IN END SYSTOLE: 1.73

## 2024-01-01 PROCEDURE — G0378 HOSPITAL OBSERVATION PER HR: HCPCS

## 2024-01-01 PROCEDURE — 25000003 PHARM REV CODE 250: Performed by: EMERGENCY MEDICINE

## 2024-01-01 PROCEDURE — 80053 COMPREHEN METABOLIC PANEL: CPT | Performed by: PHYSICIAN ASSISTANT

## 2024-01-01 PROCEDURE — 85025 COMPLETE CBC W/AUTO DIFF WBC: CPT | Performed by: PHYSICIAN ASSISTANT

## 2024-01-01 PROCEDURE — 36415 COLL VENOUS BLD VENIPUNCTURE: CPT | Performed by: PHYSICIAN ASSISTANT

## 2024-01-01 PROCEDURE — 84484 ASSAY OF TROPONIN QUANT: CPT | Performed by: PHYSICIAN ASSISTANT

## 2024-01-01 PROCEDURE — 25000003 PHARM REV CODE 250: Performed by: PHYSICIAN ASSISTANT

## 2024-01-01 PROCEDURE — 83036 HEMOGLOBIN GLYCOSYLATED A1C: CPT | Performed by: PHYSICIAN ASSISTANT

## 2024-01-01 RX ORDER — METFORMIN HYDROCHLORIDE 500 MG/1
500 TABLET ORAL
Qty: 30 TABLET | Refills: 11 | Status: SHIPPED | OUTPATIENT
Start: 2024-01-01

## 2024-01-01 RX ORDER — ATORVASTATIN CALCIUM 80 MG/1
80 TABLET, FILM COATED ORAL DAILY
Qty: 30 TABLET | Refills: 11 | Status: SHIPPED | OUTPATIENT
Start: 2024-01-01

## 2024-01-01 RX ORDER — METOPROLOL SUCCINATE 50 MG/1
50 TABLET, EXTENDED RELEASE ORAL DAILY
Qty: 30 TABLET | Refills: 11 | Status: SHIPPED | OUTPATIENT
Start: 2024-01-01 | End: 2024-12-31

## 2024-01-01 RX ORDER — LATANOPROST 50 UG/ML
1 SOLUTION/ DROPS OPHTHALMIC NIGHTLY
Qty: 2.5 ML | Refills: 6 | Status: SHIPPED | OUTPATIENT
Start: 2024-01-01 | End: 2024-01-31

## 2024-01-01 RX ORDER — PANTOPRAZOLE SODIUM 40 MG/1
TABLET, DELAYED RELEASE ORAL
Qty: 30 TABLET | Refills: 11 | Status: SHIPPED | OUTPATIENT
Start: 2024-01-01

## 2024-01-01 RX ADMIN — APIXABAN 5 MG: 5 TABLET, FILM COATED ORAL at 04:01

## 2024-01-01 RX ADMIN — ATORVASTATIN CALCIUM 80 MG: 40 TABLET, FILM COATED ORAL at 08:01

## 2024-01-01 RX ADMIN — APIXABAN 5 MG: 5 TABLET, FILM COATED ORAL at 09:01

## 2024-01-01 RX ADMIN — INSULIN ASPART 2 UNITS: 100 INJECTION, SOLUTION INTRAVENOUS; SUBCUTANEOUS at 11:01

## 2024-01-01 RX ADMIN — METOPROLOL SUCCINATE 50 MG: 50 TABLET, EXTENDED RELEASE ORAL at 09:01

## 2024-01-01 NOTE — ASSESSMENT & PLAN NOTE
Presented for lightheadedness and near syncope. Seen by tele-neuro in ED recommending resuming eliquis/metoprolol. Lightheadedness possibly related to afib with RVR reports long standing medication non-compliance.   Resume home metoprolol/eliquis  Troponin trend  Telemetry  Echo  Will repeat CT head tomorrow as recommended by tele-neuro

## 2024-01-01 NOTE — NURSING
Arrived in patients room to follow up on call from [employer] camera.  states His wife is unplugging the [employer] camera. Found patient  walking from bathroom, assisted by wife. Patient is not steady on his feet, and holding on to furniture to make it back to bed. Assisted patient back to bed. Bed alarm set. Educated patient on fall precautions and safety while here in the hospital. Patient had an assisted fall at home and did not want to repeat that if it could be prevented by nursing staff. Instructed wife to call for assistance whenever needed.

## 2024-01-01 NOTE — DISCHARGE SUMMARY
[facility] Medicine  Discharge Summary      Patient Name: Kamar Doshi  MRN: [id number]  SHIRIN: [id number]  Patient Class: OP- Observation  Admission Date: 12/31/2023  Hospital Length of Stay: 0 days  Discharge Date and Time:  01/01/2024 3:46 PM  Attending Physician: Simone Marrufo MD   Discharging Provider: Gwendolyn Mcdermott NP  Primary Care Provider: Bethel Harley MD    Primary Care Team: GWENDOLYN MCDERMOTT    HPI:   Kamar Doshi 73 y.o. male with a history of HTN, T2DM, HLD, Afib, CHF, L MCA stroke s/p tPA and EVT for M1 occlusion (Jan 2019) presents to the hospital with a chief complaint of lightheadedness. Per chart review patient has history of right sided weakness and aphasia after CVA.  He reports today he experienced sudden-onset weakness and lightheadedness described as the walls closing at.  His wife called him and assisted him to the ground.  He reports he now feels at his baseline.  He reports he has not taken his medications in many years.  He is unsure of medical history.  He denies fever chest pain nausea vomiting abdominal pain leg swelling melena hematuria hematemesis dizziness and syncope.  Further history is limited as he has a poor historian.    Attempts were made to reach the patient's wife via phone number without success.    In the ED, seen by vascular Neurology not a candidate for tPA recommending to resume Eliquis the wife was present during vascular neurology evaluation reported his speech and strength were at baseline CT head without acute infarct initially AFib RVR rates near 130 rate controlled to 90 afebrile without leukocytosis.    * No surgery found *      Hospital Course:   Patient admitted observation for near-syncope secondary to AFib RVR. Patient with known nonadherence to home medication Eliquis and metoprolol.  Patient denies chest pain or shortness a breath. CT head stated superimposed infarct can not be entirely excluded.  Repeat CT head showed  remote infarcts and no acute intracranial process. Patient had mild aphasia which is baseline from him otherwise no other focal neuro deficit on exam. Heart rate improved with metoprolol. Heart rate 60's on discharge. Strongly encourage patient compliance with home medication.  Patient ready for discharge home.  Patient hemodynamically stable for discharge home with wife. 2 D echo pending on discharge- will call patient with result.   All findings and plan were explained to the patient and wife . All questions and concerns were answered. Patient verbalized understanding. Patient is in stable condition to d/c home and has been informed to follow up with PCP.      Goals of Care Treatment Preferences:  Code Status: Full Code      Consults:   Consults (From admission, onward)          Status Ordering Provider     Case Management/  Once        Provider:  (Not yet assigned)    DIANN Garcia     Consult to Telemedicine - Acute Stroke  Once        Provider:  Reji Braswell MD    Completed JEFE FELDER            No new Assessment & Plan notes have been filed under this hospital service since the last note was generated.  Service: Hospital Medicine    Final Active Diagnoses:    Diagnosis Date Noted POA    PRINCIPAL PROBLEM:  Lightheadedness [R42] 12/31/2023 Yes    Broca's aphasia [R47.01] 05/03/2019 Yes    Right arm weakness [R29.898] 04/04/2019 Yes    Chronic atrial fibrillation [I48.20] 02/01/2019 Yes    Pure hypercholesterolemia [E78.00] 02/01/2019 Yes    Type 2 diabetes mellitus with diabetic cataract, without long-term current use of insulin [E11.36]  Yes    History of embolic stroke L MCA; left basal ganglia; hospitalization [facility] 1/2019 [Z86.73] 01/02/2019 Not Applicable      Problems Resolved During this Admission:       Discharged Condition: stable    Disposition: Home or Self Care    Follow Up:    Patient Instructions:      Diet Cardiac     Activity as tolerated        Significant Diagnostic Studies: N/A    Pending Diagnostic Studies:       Procedure Component Value Units Date/Time    Echo [[id number]]  (Abnormal) Resulted: 01/01/24 1033    Order Status: Sent Lab Status: In process Updated: 01/01/24 1035     BSA 2.3 m2      LVOT stroke volume 24.26 cm3      LVIDd 5.51 cm      LV Systolic Volume 99.11 mL      LV Systolic Volume Index 48.6 mL/m2      LVIDs 4.64 cm      LV Diastolic Volume 147.87 mL      LV Diastolic Volume Index 72.49 mL/m2      IVS 1.08 cm      LVOT diameter 2.03 cm      LVOT area 3.2 cm2      FS 16 %      Left Ventricle Relative Wall Thickness 0.50 cm      Posterior Wall 1.39 cm      LV mass 284.22 g      LV Mass Index 139 g/m2      MV Peak E Doe 0.90 m/s      TR Max Doe 2.06 m/s      IVRT 75.64 msec      LVOT peak doe 0.55 m/s      Left Ventricular Outflow Tract Mean Velocity 0.41 cm/s      Left Ventricular Outflow Tract Mean Gradient 0.73 mmHg      RVDD 3.91 cm      RV S' 10.37 cm/s      TAPSE 1.37 cm      LA size 4.60 cm      Left Atrium Minor Axis 7.18 cm      Left Atrium Major Axis 6.81 cm      RA Major Axis 6.74 cm      AV mean gradient 2 mmHg      AV peak gradient 3 mmHg      Ao peak doe 0.91 m/s      Ao VTI 14.80 cm      LVOT peak VTI 7.50 cm      AV valve area 1.64 cm²      AV Velocity Ratio 0.60     AV index (prosthetic) 0.51     LILY by Velocity Ratio 1.96 cm²      Triscuspid Valve Regurgitation Peak Gradient 17 mmHg      PV PEAK VELOCITY 0.63 m/s      PV peak gradient 2 mmHg      Sinus 3.80 cm      STJ 2.42 cm      Ascending aorta 3.23 cm      IVC diameter 1.80 cm      ZLVIDS 1.73     ZLVIDD -0.99     LA Volume Index 60.3 mL/m2      LA volume 122.99 cm3      LA WIDTH 4.5 cm      RA Width 4.3 cm            Medications:  Reconciled Home Medications:      Medication List        CONTINUE taking these medications      apixaban 5 mg Tab  Commonly known as: ELIQUIS  Take 1 tablet (5 mg total) by mouth 2 (two) times daily.     atorvastatin 80 MG  "tablet  Commonly known as: LIPITOR  Take 1 tablet (80 mg total) by mouth once daily. Take 1 tab by mouth every night     blood sugar diagnostic Strp  To check BG 1 times daily, to use with insurance preferred meter     blood-glucose meter kit  To check BG 1 times daily, to use with insurance preferred meter     brimonidine-timoloL 0.2-0.5 % Drop  Commonly known as: COMBIGAN  Place 1 drop into the left eye 2 (two) times a day.     lancets Misc  To check BG 1 times daily, to use with insurance preferred meter     LANTUS SOLOSTAR U-100 INSULIN glargine 100 units/mL SubQ pen  Generic drug: insulin  Inject 10 Units into the skin every evening.     latanoprost 0.005 % ophthalmic solution  Place 1 drop into both eyes nightly.     metFORMIN 500 MG tablet  Commonly known as: GLUCOPHAGE  Take 1 tablet (500 mg total) by mouth daily with breakfast.     metoprolol succinate 50 MG 24 hr tablet  Commonly known as: TOPROL-XL  Take 1 tablet (50 mg total) by mouth once daily.     pantoprazole 40 MG tablet  Commonly known as: PROTONIX  TAKE 1 TABLET BY MOUTH ONCE DAILY IN THE MORNING BEFORE BREAKFAST     pen needle, diabetic 32 gauge x 5/32" Ndle  Daily insulin administration              Indwelling Lines/Drains at time of discharge:   Lines/Drains/Airways       None                   Time spent on the discharge of patient: 35  minutes         Gwendolyn Cobb NP  Department of Hospital Medicine  [facility] [place] - Telemetry  "

## 2024-01-01 NOTE — TELEPHONE ENCOUNTER
1700 1/1/2024. Multiple attempts to to reach patient and wife to discuss 2  D echo results and return to hospital; however, no answer on both phone listed in chart Patient [phone] and Wife Cely [phone]. Charge nurse will also try to contact patient and wife.

## 2024-01-01 NOTE — ED NOTES
Report received from Khalif RN; pt resting comfortably awake in bed; pt aware of hospital admission and pending transport upstairs; pt denies any complaints at this time; no distress noted; pt in hospital gown but also wearing jeans and boots per patient preference

## 2024-01-01 NOTE — PROGRESS NOTES
Pt arrived to unit on WC.  NAD noted, Pt is confused, dysarthric, slow to respond, shifted to bed, does not want the jeans and shoes to be taken off from him, informed about the need to be on a non skid shocks (still refuses the shoes to be taken off from him).  Pt placed on telebox, VS obtainbed and recorded. Pt oriented to room, informed about the use  of call light, bed alarm on.    Refuses using urinals, walked  to the bathroom eveytime, pt had to pee.     Bed low in ht, wheels locked, side rails up X 3, bed alarm set  Will continue to monitor

## 2024-01-01 NOTE — PLAN OF CARE
Problem: Adult Inpatient Plan of Care  Goal: Optimal Comfort and Wellbeing  Outcome: Ongoing, Progressing  Goal: Readiness for Transition of Care  Outcome: Ongoing, Progressing     Problem: Diabetes Comorbidity  Goal: Blood Glucose Level Within Targeted Range  Outcome: Ongoing, Progressing  Intervention: Monitor and Manage Glycemia  Flowsheets (Taken 1/1/2024 0443)  Glycemic Management:   blood glucose monitored   insulin dose matched to carbohydrate intake     Problem: Fall Injury Risk  Goal: Absence of Fall and Fall-Related Injury  Outcome: Ongoing, Progressing  Intervention: Identify and Manage Contributors  Flowsheets (Taken 1/1/2024 0443)  Self-Care Promotion:   independence encouraged   BADL personal objects within reach   BADL personal routines maintained  Medication Review/Management: medications reviewed  Intervention: Promote Injury-Free Environment  Flowsheets (Taken 1/1/2024 0443)  Safety Promotion/Fall Prevention:   assistive device/personal item within reach   lighting adjusted   medications reviewed   nonskid shoes/socks when out of bed   side rails raised x 3   instructed to call staff for mobility

## 2024-01-01 NOTE — H&P
[facility] [place] [place] [facility]  [facility] Medicine  History & Physical    Patient Name: Kamar Doshi  MRN: [id number]  Patient Class: OP- Observation  Admission Date: 12/31/2023  Attending Physician: David Duran MD   Primary Care Provider: Bethel Harley MD         Patient information was obtained from patient, past medical records, and ER records.     Subjective:     Principal Problem:Lightheadedness    Chief Complaint:   Chief Complaint   Patient presents with    Weakness     Ems called to 72yo male that wife stated got very weak and lightheaded and she helped him to the ground. Symptoms started about 1325. Patient complaining of R sided weakness and has speech deficit. MD Duran to activate stroke alert. Per EMS EKG, patient in A-fib RVR.        HPI: Kamar Doshi 73 y.o. male with a history of HTN, T2DM, HLD, Afib, CHF, L MCA stroke s/p tPA and EVT for M1 occlusion (Jan 2019) presents to the hospital with a chief complaint of lightheadedness. Per chart review patient has history of right sided weakness and aphasia after CVA.  He reports today he experienced sudden-onset weakness and lightheadedness described as the walls closing at.  His wife called him and assisted him to the ground.  He reports he now feels at his baseline.  He reports he has not taken his medications in many years.  He is unsure of medical history.  He denies fever chest pain nausea vomiting abdominal pain leg swelling melena hematuria hematemesis dizziness and syncope.  Further history is limited as he has a poor historian.    Attempts were made to reach the patient's wife via phone number without success.    In the ED, seen by vascular Neurology not a candidate for tPA recommending to resume Eliquis the wife was present during vascular neurology evaluation reported his speech and strength were at baseline CT head without acute infarct initially AFib RVR rates near 130 rate controlled to 90 afebrile without leukocytosis.    Past  "Medical History:   Diagnosis Date    Atrial fibrillation     Clotting disorder     Hyperlipidemia     Hypertension     Stroke     Vision loss 01/01/2019       Past Surgical History:   Procedure Laterality Date    BRAIN SURGERY  01/01/2018       Review of patient's allergies indicates:  No Known Allergies    No current facility-administered medications on file prior to encounter.     Current Outpatient Medications on File Prior to Encounter   Medication Sig    apixaban (ELIQUIS) 5 mg Tab Take 1 tablet (5 mg total) by mouth 2 (two) times daily.    atorvastatin (LIPITOR) 80 MG tablet Take 1 tablet (80 mg total) by mouth once daily. Take 1 tab by mouth every night    blood sugar diagnostic Strp To check BG 1 times daily, to use with insurance preferred meter    blood-glucose meter kit To check BG 1 times daily, to use with insurance preferred meter    brimonidine-timoloL (COMBIGAN) 0.2-0.5 % Drop Place 1 drop into the left eye 2 (two) times a day.    insulin (LANTUS SOLOSTAR U-100 INSULIN) glargine 100 units/mL (3mL) SubQ pen Inject 10 Units into the skin every evening.    lancets Misc To check BG 1 times daily, to use with insurance preferred meter    latanoprost 0.005 % ophthalmic solution Place 1 drop into both eyes nightly.    metFORMIN (GLUCOPHAGE) 500 MG tablet Take 1 tablet (500 mg total) by mouth daily with breakfast.    metoprolol succinate (TOPROL-XL) 50 MG 24 hr tablet Take 1 tablet (50 mg total) by mouth once daily.    pantoprazole (PROTONIX) 40 MG tablet TAKE 1 TABLET BY MOUTH ONCE DAILY IN THE MORNING BEFORE BREAKFAST    pen needle, diabetic 32 gauge x 5/32" Ndle Daily insulin administration     Family History       Problem Relation (Age of Onset)    Alcohol abuse Brother    Arthritis Mother, Father    COPD Brother    Heart disease Father    No Known Problems Sister, Sister    Peripheral vascular disease Sister          Tobacco Use    Smoking status: Former     Current packs/day: 0.00     Average " packs/day: 1 pack/day for 56.0 years (56.0 ttl pk-yrs)     Types: Cigarettes     Start date:      Quit date: 2019     Years since quittin.0    Smokeless tobacco: Never   Substance and Sexual Activity    Alcohol use: No    Drug use: No    Sexual activity: Never     Review of Systems   Constitutional:  Negative for chills and fever.   HENT:  Negative for nosebleeds and tinnitus.    Eyes:  Negative for photophobia and visual disturbance.   Respiratory:  Negative for shortness of breath and wheezing.    Cardiovascular:  Negative for chest pain, palpitations and leg swelling.   Gastrointestinal:  Negative for abdominal distention, nausea and vomiting.   Genitourinary:  Negative for dysuria, flank pain and hematuria.   Musculoskeletal:  Negative for gait problem and joint swelling.   Skin:  Negative for rash and wound.   Neurological:  Positive for light-headedness. Negative for seizures and syncope.     Objective:     Vital Signs (Most Recent):  Temp: 96.5 °F (35.8 °C) (23 1654)  Pulse: 98 (23 1701)  Resp: 20 (23 170)  BP: (!) 172/96 (23 1654)  SpO2: 97 % (23 170) Vital Signs (24h Range):  Temp:  [96.5 °F (35.8 °C)] 96.5 °F (35.8 °C)  Pulse:  [] 98  Resp:  [15-23] 20  SpO2:  [87 %-97 %] 97 %  BP: (131-172)/(92-96) 172/96     Weight: 104.3 kg (230 lb)  Body mass index is 31.19 kg/m².     Physical Exam  Vitals and nursing note reviewed.   Constitutional:       General: He is not in acute distress.     Appearance: He is well-developed. He is not diaphoretic.   HENT:      Head: Normocephalic and atraumatic.      Right Ear: External ear normal.      Left Ear: External ear normal.   Eyes:      General:         Right eye: No discharge.         Left eye: No discharge.      Conjunctiva/sclera: Conjunctivae normal.   Neck:      Thyroid: No thyromegaly.   Cardiovascular:      Rate and Rhythm: Normal rate. Rhythm irregular.      Heart sounds: No murmur heard.  Pulmonary:      Effort:  "Pulmonary effort is normal. No respiratory distress.      Breath sounds: Normal breath sounds.   Abdominal:      General: Bowel sounds are normal. There is no distension.      Palpations: Abdomen is soft. There is no mass.      Tenderness: There is no abdominal tenderness.   Musculoskeletal:         General: No deformity.      Cervical back: Normal range of motion and neck supple.      Right lower leg: No edema.      Left lower leg: No edema.   Skin:     General: Skin is warm and dry.   Neurological:      Mental Status: He is alert.      Sensory: No sensory deficit.      Comments: Symmetric movement of BUE and BLE against gravity. Some intermittent expressive aphasia   Psychiatric:         Mood and Affect: Mood normal.         Behavior: Behavior normal.                Significant Labs: CBC:   Recent Labs   Lab 12/31/23  1447 12/31/23  1500   WBC 7.18  --    HGB 16.1  --    HCT 49.4 55*     --      CMP:   Recent Labs   Lab 12/31/23  1447      K 3.6      CO2 25   *   BUN 9   CREATININE 1.1   CALCIUM 10.1   PROT 9.3*   ALBUMIN 4.4   BILITOT 0.6   ALKPHOS 79   AST 19   ALT 14   ANIONGAP 14     Cardiac Markers: No results for input(s): "CKMB", "MYOGLOBIN", "BNP", "TROPISTAT" in the last 48 hours.  TSH:   Recent Labs   Lab 12/31/23  1447   TSH 11.456*       Significant Imaging:   Imaging Results              CT Head Without Contrast (Final result)  Result time 12/31/23 14:55:17      Final result by Kevin Whitlock MD (12/31/23 14:55:17)                   Impression:      1. Suspected encephalomalacia accounts for low attenuation within the left basal ganglia.  No previous examinations are available for comparison.  Superimposed infarct cannot be entirely excluded, correlation with current symptomatology advised.  Further evaluation as warranted.  2. Sequela of chronic microvascular ischemic change and senescent change.      Electronically signed by: Kevin Whitlock, " MD  Date:    12/31/2023  Time:    14:55               Narrative:    EXAMINATION:  CT HEAD WITHOUT CONTRAST    CLINICAL HISTORY:  Neuro deficit, acute, stroke suspected;    TECHNIQUE:  Low dose axial images were obtained through the head.  Coronal and sagittal reformations were also performed. Contrast was not administered.    COMPARISON:  None.    FINDINGS:  There is generalized cerebral volume loss.  There is hypoattenuation in a periventricular fashion, likely sequela of chronic microvascular ischemic change.  There is no evidence of acute hemorrhage, or mass.  There is suspected encephalomalacia involving the left basal ganglia extending to the left inferior temporal region.  There is no hydrocephalus.  There are no abnormal extra-axial fluid collections.  The paranasal sinuses and mastoid air cells are clear, and there is no evidence of calvarial fracture.  The visualized soft tissues are unremarkable.                                      Assessment/Plan:     * Lightheadedness  Presented for lightheadedness and near syncope. Seen by tele-neuro in ED recommending resuming eliquis/metoprolol. Lightheadedness possibly related to afib with RVR reports long standing medication non-compliance.   Resume home metoprolol/eliquis  Troponin trend  Telemetry  Echo  Will repeat CT head tomorrow as recommended by tele-neuro    Broca's aphasia  Per chart review, able to express self on exam. Wife present at bedside during teleneuro eval and reported speech at baseline    Right arm weakness  Per chart review history of right arm weakness. Symmetric movement of BUE against gravity on exam. See above    Pure hypercholesterolemia  Continue home statin    Chronic atrial fibrillation  Patient with Paroxysmal (<7 days) atrial fibrillation which is controlled currently with Beta Blocker. Patient is currently in atrial fibrillation.[id number] Score: 1. Anticoagulation indicated. Anticoagulation done with resume home eliquis .    Type 2  diabetes mellitus with diabetic cataract, without long-term current use of insulin  Patient's FSGs are controlled on current medication regimen.  Last A1c reviewed-   Lab Results   Component Value Date    HGBA1C >14.0 (H) 12/01/2021     Most recent fingerstick glucose reviewed-   Recent Labs   Lab 12/31/23  1430   POCTGLUCOSE 243*     Current correctional scale  Low  Maintain anti-hyperglycemic dose as follows-   Antihyperglycemics (From admission, onward)      Start     Stop Route Frequency Ordered    12/31/23 1901  insulin aspart U-100 pen 0-5 Units         -- SubQ Before meals & nightly PRN 12/31/23 1801          Hold Oral hypoglycemics while patient is in the hospital.    History of embolic stroke L MCA; left basal ganglia; hospitalization [facility] 1/2019  Seen by teleneuro in ED not a candidate for TPA recommending to resume eliquis      VTE Risk Mitigation (From admission, onward)           Ordered     apixaban tablet 5 mg  2 times daily         12/31/23 1606                     Discussed with ED physician.    VTE: eliquis  Code:Full  Diet: cardiac  Dispo: pending troponin trend and echo    On 12/31/2023, patient should be placed in hospital observation services under my care in collaboration with Simnoe Marrufo MD.      AdmissionCare    Guideline: Transient Ischemic Attack (TIA) - OBS, Observation    Based on the indications selected for the patient, the bed status of Admit to Observation was determined to be MET    The following indications were selected as present at the time of evaluation of the patient:      - Appropriate evaluation (brain MRI, echocardiogram, carotid imaging) cannot be completed in emergency department time frame (3 to 4 hours).    AdmissionCare documentation entered by: Priscila Guillen    [facility] iScreen Vision, 27th edition, Copyright © 2023 [facility] [employer] All Rights Reserved.  [id number]:24:35-06:00    Faizan Fang PA-C  Department of Hospital Medicine  [facility] - Emergency Dept

## 2024-01-01 NOTE — ASSESSMENT & PLAN NOTE
Per chart review, able to express self on exam. Wife present at bedside during teleneuro eval and reported speech at baseline

## 2024-01-01 NOTE — ASSESSMENT & PLAN NOTE
Per chart review history of right arm weakness. Symmetric movement of BUE against gravity on exam. See above

## 2024-01-01 NOTE — PROGRESS NOTES
[facility] [place]  Nurses Note -- 4 Eyes      1/1/2024       Skin assessed on: Admit      [x] No Pressure Injuries Present    [x]Prevention Measures Documented    [] Yes LDA  for Pressure Injury Previously documented     [] Yes New Pressure Injury Discovered   [] LDA for New Pressure Injury Added      Attending RN:  Yaquelin Kam RN     Second RN: DHARMESH Perkins

## 2024-01-01 NOTE — PLAN OF CARE
Problem: Adult Inpatient Plan of Care  Goal: Plan of Care Review  Outcome: Ongoing, Progressing  Flowsheets (Taken 1/1/2024 1556)  Plan of Care Reviewed With: patient     Problem: Diabetes Comorbidity  Goal: Blood Glucose Level Within Targeted Range  Outcome: Ongoing, Progressing  Intervention: Monitor and Manage Glycemia  Flowsheets (Taken 1/1/2024 1556)  Glycemic Management:   blood glucose monitored   supplemental insulin given     Problem: Fall Injury Risk  Goal: Absence of Fall and Fall-Related Injury  Outcome: Ongoing, Progressing  Intervention: Identify and Manage Contributors  Flowsheets (Taken 1/1/2024 1556)  Self-Care Promotion:   independence encouraged   BADL personal objects within reach   BADL personal routines maintained  Medication Review/Management: medications reviewed  Intervention: Promote Injury-Free Environment  Flowsheets (Taken 1/1/2024 1556)  Safety Promotion/Fall Prevention: room near unit station

## 2024-01-01 NOTE — ASSESSMENT & PLAN NOTE
Patient with Paroxysmal (<7 days) atrial fibrillation which is controlled currently with Beta Blocker. Patient is currently in atrial fibrillation.[id number] Score: 1. Anticoagulation indicated. Anticoagulation done with resume home eliquis .

## 2024-01-01 NOTE — PLAN OF CARE
CM notified nurse, Iman that pt is ready for discharge pending CT scan. All CM needs met.     01/01/24 1300   Final Note   Assessment Type Final Discharge Note   Anticipated Discharge Disposition Home   What phone number can be called within the next 1-3 days to see how you are doing after discharge? [phone]   Hospital Resources/Appts/Education Provided Appointments scheduled and added to AVS   Post-Acute Status   Discharge Delays (!) Procedure Scheduling (IR, OR, Labs, Echo, Cath, Echo, EEG)

## 2024-01-01 NOTE — NURSING
Patient off the unit via wheelchair, on room air, awake and alert. No distress noted. Tele and IV removed with tip intact. Wife alongside. Instructions reviewed with patient and wife. MD notified about prescriptions refills. Gave pm dose of eliquis per MD, in case pharmacy is closed for the holiday

## 2024-01-01 NOTE — HOSPITAL COURSE
Patient admitted observation for near-syncope secondary to AFib RVR. Patient with known nonadherence to home medication Eliquis and metoprolol.  Patient denies chest pain or shortness a breath. CT head stated superimposed infarct can not be entirely excluded.  Repeat CT head showed remote infarcts and no acute intracranial process. Patient had mild aphasia which is baseline from him otherwise no other focal neuro deficit on exam. Heart rate improved with metoprolol. Heart rate 60's on discharge. Strongly encourage patient compliance with home medication.  Patient ready for discharge home.  Patient hemodynamically stable for discharge home with wife. 2 D echo pending on discharge- will call patient with result.   All findings and plan were explained to the patient and wife . All questions and concerns were answered. Patient verbalized understanding. Patient is in stable condition to d/c home and has been informed to follow up with PCP.

## 2024-01-01 NOTE — NURSING
"Patient refused 4eyes assessment.  He prefers to keep his jeans and boots on. When asked about help removing clothes, he asked to be "Left alone." Unable to assess orientation, Patient is asking for cigarettes.  "

## 2024-01-02 DIAGNOSIS — E11.9 TYPE 2 DIABETES MELLITUS WITHOUT COMPLICATION: ICD-10-CM

## 2024-01-04 ENCOUNTER — PATIENT OUTREACH (OUTPATIENT)
Dept: ADMINISTRATIVE | Facility: CLINIC | Age: 74
End: 2024-01-04
Payer: MEDICARE

## 2024-01-04 NOTE — PROGRESS NOTES
C3 nurse attempted to contact .name for a TCC post hospital discharge follow up call. No answer. No voicemail available. The patient has a scheduled HOSFU appointment with Bethel Harley MD on 01/12/24 @ 0900.

## 2024-01-05 ENCOUNTER — TELEPHONE (OUTPATIENT)
Dept: HEPATOLOGY | Facility: HOSPITAL | Age: 74
End: 2024-01-05
Payer: MEDICARE

## 2024-01-05 NOTE — PROGRESS NOTES
2nd attempt to make TCC Call. Call drops without any ring tone, no voicemail options.        [de-identified] : 67-year-old woman with mild asthma/COPD overlap, current smoker, hypertension, hyperlipidemia, fibromyalgia, seronegative inflammatory arthritis, bilateral knee OA, carpal tunnel syndrome, hepatic steatosis, anxiety, depression, colon polyps, peptic ulcer disease on chronic PPI therapy. Presenting for followup\par \par Reports stable rheum symptoms including pain predominantly in hands and knees as well as right sided rib /abdomen pain and tenderness. She also reports mild weight gain and mild swelling of ankles. Cough and SOB are present but improved as she has cut down on smoking. No fevers, chills, chest pain, abdominal pain, N/V/D/C. \par \par States that depression is more severe currently due to social isolation and weather and has been eating more unhealthy foods leading to weight gain. \par \par Requesting referral for mammogram.

## 2024-01-05 NOTE — TELEPHONE ENCOUNTER
"Multiple attempts to reach patient on 1/4 and 1/5/2024 to give 2 D echo result and notify need for follow with Cardiology 2/21/2024. Not able to reach patient [phone] "not accepting calls" and Spouse Cely [phone] no answer.  "

## 2024-01-05 NOTE — PROGRESS NOTES
3rd attempt for TCC Call; Left voicemail. Please call [phone] please leave first and last name and  for Figueroa. We will return your call.

## 2024-01-08 ENCOUNTER — PATIENT OUTREACH (OUTPATIENT)
Dept: ADMINISTRATIVE | Facility: HOSPITAL | Age: 74
End: 2024-01-08
Payer: MEDICARE

## 2024-01-11 PROBLEM — E78.5 DYSLIPIDEMIA: Status: ACTIVE | Noted: 2019-02-01

## 2024-01-15 ENCOUNTER — HOSPITAL ENCOUNTER (INPATIENT)
Facility: HOSPITAL | Age: 74
LOS: 3 days | Discharge: HOSPICE | DRG: 280 | End: 2024-01-18
Attending: EMERGENCY MEDICINE | Admitting: HOSPITALIST
Payer: MEDICARE

## 2024-01-15 DIAGNOSIS — R00.0 TACHYCARDIA: ICD-10-CM

## 2024-01-15 DIAGNOSIS — I21.4 NON-ST ELEVATION MYOCARDIAL INFARCTION (NSTEMI): ICD-10-CM

## 2024-01-15 DIAGNOSIS — R46.89 ALTERED BEHAVIOR: ICD-10-CM

## 2024-01-15 DIAGNOSIS — I21.4 NSTEMI (NON-ST ELEVATED MYOCARDIAL INFARCTION): ICD-10-CM

## 2024-01-15 DIAGNOSIS — R06.03 ACUTE RESPIRATORY DISTRESS: Primary | ICD-10-CM

## 2024-01-15 PROBLEM — D72.829 LEUKOCYTOSIS: Status: ACTIVE | Noted: 2024-01-15

## 2024-01-15 PROBLEM — I21.9 MYOCARDIAL INFARCTION: Status: ACTIVE | Noted: 2024-01-15

## 2024-01-15 PROBLEM — I50.43 ACUTE ON CHRONIC COMBINED SYSTOLIC AND DIASTOLIC HEART FAILURE: Status: ACTIVE | Noted: 2019-02-01

## 2024-01-15 PROBLEM — G93.41 ACUTE METABOLIC ENCEPHALOPATHY: Status: ACTIVE | Noted: 2024-01-15

## 2024-01-15 PROBLEM — I48.91 A-FIB: Status: ACTIVE | Noted: 2024-01-15

## 2024-01-15 PROBLEM — J96.01 ACUTE HYPOXEMIC RESPIRATORY FAILURE: Status: ACTIVE | Noted: 2024-01-15

## 2024-01-15 LAB
ALBUMIN SERPL BCP-MCNC: 4.2 G/DL (ref 3.5–5.2)
ALLENS TEST: ABNORMAL
ALLENS TEST: ABNORMAL
ALP SERPL-CCNC: 73 U/L (ref 55–135)
ALT SERPL W/O P-5'-P-CCNC: 24 U/L (ref 10–44)
ANION GAP SERPL CALC-SCNC: 11 MMOL/L (ref 8–16)
APTT PPP: 27.3 SEC (ref 21–32)
APTT PPP: 32.4 SEC (ref 21–32)
AST SERPL-CCNC: 100 U/L (ref 10–40)
BACTERIA #/AREA URNS HPF: ABNORMAL /HPF
BASOPHILS # BLD AUTO: 0.03 K/UL (ref 0–0.2)
BASOPHILS NFR BLD: 0.2 % (ref 0–1.9)
BILIRUB SERPL-MCNC: 1.9 MG/DL (ref 0.1–1)
BILIRUB UR QL STRIP: NEGATIVE
BNP SERPL-MCNC: 560 PG/ML (ref 0–99)
BUN SERPL-MCNC: 13 MG/DL (ref 8–23)
CALCIUM SERPL-MCNC: 10 MG/DL (ref 8.7–10.5)
CHLORIDE SERPL-SCNC: 104 MMOL/L (ref 95–110)
CLARITY UR: CLEAR
CO2 SERPL-SCNC: 22 MMOL/L (ref 23–29)
COLOR UR: YELLOW
CREAT SERPL-MCNC: 1 MG/DL (ref 0.5–1.4)
CTP QC/QA: YES
CTP QC/QA: YES
DELSYS: ABNORMAL
DIFFERENTIAL METHOD BLD: ABNORMAL
EOSINOPHIL # BLD AUTO: 0 K/UL (ref 0–0.5)
EOSINOPHIL NFR BLD: 0 % (ref 0–8)
EP: 5
ERYTHROCYTE [DISTWIDTH] IN BLOOD BY AUTOMATED COUNT: 12.2 % (ref 11.5–14.5)
EST. GFR  (NO RACE VARIABLE): >60 ML/MIN/1.73 M^2
FIO2: 85
GLUCOSE SERPL-MCNC: 258 MG/DL (ref 70–110)
GLUCOSE UR QL STRIP: ABNORMAL
GRAN CASTS #/AREA URNS LPF: 2 /LPF
HCO3 UR-SCNC: 19.2 MMOL/L (ref 24–28)
HCT VFR BLD AUTO: 48.8 % (ref 40–54)
HGB BLD-MCNC: 15.9 G/DL (ref 14–18)
HGB UR QL STRIP: ABNORMAL
HYALINE CASTS #/AREA URNS LPF: 1 /LPF
IMM GRANULOCYTES # BLD AUTO: 0.06 K/UL (ref 0–0.04)
IMM GRANULOCYTES NFR BLD AUTO: 0.4 % (ref 0–0.5)
INR PPP: 1.2 (ref 0.8–1.2)
IP: 12
KETONES UR QL STRIP: ABNORMAL
LACTATE SERPL-SCNC: 2.8 MMOL/L (ref 0.5–2.2)
LACTATE SERPL-SCNC: 3.4 MMOL/L (ref 0.5–2.2)
LDH SERPL L TO P-CCNC: 3.14 MMOL/L (ref 0.5–2.2)
LEUKOCYTE ESTERASE UR QL STRIP: NEGATIVE
LIPASE SERPL-CCNC: 6 U/L (ref 4–60)
LYMPHOCYTES # BLD AUTO: 1.4 K/UL (ref 1–4.8)
LYMPHOCYTES NFR BLD: 10.4 % (ref 18–48)
MAGNESIUM SERPL-MCNC: 1.8 MG/DL (ref 1.6–2.6)
MCH RBC QN AUTO: 29.5 PG (ref 27–31)
MCHC RBC AUTO-ENTMCNC: 32.6 G/DL (ref 32–36)
MCV RBC AUTO: 91 FL (ref 82–98)
MICROSCOPIC COMMENT: ABNORMAL
MODE: ABNORMAL
MONOCYTES # BLD AUTO: 1.1 K/UL (ref 0.3–1)
MONOCYTES NFR BLD: 7.8 % (ref 4–15)
NEUTROPHILS # BLD AUTO: 11.2 K/UL (ref 1.8–7.7)
NEUTROPHILS NFR BLD: 81.2 % (ref 38–73)
NITRITE UR QL STRIP: NEGATIVE
NRBC BLD-RTO: 0 /100 WBC
PCO2 BLDA: 34 MMHG (ref 35–45)
PH SMN: 7.36 [PH] (ref 7.35–7.45)
PH UR STRIP: 6 [PH] (ref 5–8)
PLATELET # BLD AUTO: 225 K/UL (ref 150–450)
PMV BLD AUTO: 10.5 FL (ref 9.2–12.9)
PO2 BLDA: 327 MMHG (ref 80–100)
POC BE: -5 MMOL/L
POC MOLECULAR INFLUENZA A AGN: NEGATIVE
POC MOLECULAR INFLUENZA B AGN: NEGATIVE
POC SATURATED O2: 100 % (ref 95–100)
POC TCO2: 20 MMOL/L (ref 23–27)
POCT GLUCOSE: 378 MG/DL (ref 70–110)
POTASSIUM SERPL-SCNC: 4.6 MMOL/L (ref 3.5–5.1)
PROT SERPL-MCNC: 8.2 G/DL (ref 6–8.4)
PROT UR QL STRIP: ABNORMAL
PROTHROMBIN TIME: 12.6 SEC (ref 9–12.5)
RBC # BLD AUTO: 5.39 M/UL (ref 4.6–6.2)
RBC #/AREA URNS HPF: 5 /HPF (ref 0–4)
SAMPLE: ABNORMAL
SAMPLE: ABNORMAL
SARS-COV-2 RDRP RESP QL NAA+PROBE: NEGATIVE
SITE: ABNORMAL
SITE: ABNORMAL
SODIUM SERPL-SCNC: 137 MMOL/L (ref 136–145)
SP GR UR STRIP: 1.03 (ref 1–1.03)
SP02: 100
SPONT RATE: 18
T4 FREE SERPL-MCNC: 1 NG/DL (ref 0.71–1.51)
TROPONIN I SERPL DL<=0.01 NG/ML-MCNC: 35.09 NG/ML (ref 0–0.03)
TROPONIN I SERPL DL<=0.01 NG/ML-MCNC: 40.12 NG/ML (ref 0–0.03)
TROPONIN I SERPL DL<=0.01 NG/ML-MCNC: 44.52 NG/ML (ref 0–0.03)
TSH SERPL DL<=0.005 MIU/L-ACNC: 8.4 UIU/ML (ref 0.4–4)
URN SPEC COLLECT METH UR: ABNORMAL
UROBILINOGEN UR STRIP-ACNC: NEGATIVE EU/DL
WBC # BLD AUTO: 13.8 K/UL (ref 3.9–12.7)
WBC #/AREA URNS HPF: 2 /HPF (ref 0–5)
YEAST URNS QL MICRO: ABNORMAL

## 2024-01-15 PROCEDURE — 84484 ASSAY OF TROPONIN QUANT: CPT | Performed by: EMERGENCY MEDICINE

## 2024-01-15 PROCEDURE — 99291 CRITICAL CARE FIRST HOUR: CPT | Mod: ,,, | Performed by: INTERNAL MEDICINE

## 2024-01-15 PROCEDURE — 25000003 PHARM REV CODE 250: Performed by: EMERGENCY MEDICINE

## 2024-01-15 PROCEDURE — 25000003 PHARM REV CODE 250: Performed by: HOSPITALIST

## 2024-01-15 PROCEDURE — 83690 ASSAY OF LIPASE: CPT | Performed by: EMERGENCY MEDICINE

## 2024-01-15 PROCEDURE — 83605 ASSAY OF LACTIC ACID: CPT

## 2024-01-15 PROCEDURE — 80053 COMPREHEN METABOLIC PANEL: CPT | Performed by: EMERGENCY MEDICINE

## 2024-01-15 PROCEDURE — 93010 ELECTROCARDIOGRAM REPORT: CPT | Mod: ,,, | Performed by: INTERNAL MEDICINE

## 2024-01-15 PROCEDURE — 96366 THER/PROPH/DIAG IV INF ADDON: CPT

## 2024-01-15 PROCEDURE — 94660 CPAP INITIATION&MGMT: CPT

## 2024-01-15 PROCEDURE — 83735 ASSAY OF MAGNESIUM: CPT | Performed by: EMERGENCY MEDICINE

## 2024-01-15 PROCEDURE — 99900035 HC TECH TIME PER 15 MIN (STAT)

## 2024-01-15 PROCEDURE — 84439 ASSAY OF FREE THYROXINE: CPT | Performed by: HOSPITALIST

## 2024-01-15 PROCEDURE — 83605 ASSAY OF LACTIC ACID: CPT | Mod: 91 | Performed by: HOSPITALIST

## 2024-01-15 PROCEDURE — 84484 ASSAY OF TROPONIN QUANT: CPT | Mod: 91 | Performed by: HOSPITALIST

## 2024-01-15 PROCEDURE — 85025 COMPLETE CBC W/AUTO DIFF WBC: CPT | Performed by: EMERGENCY MEDICINE

## 2024-01-15 PROCEDURE — 94640 AIRWAY INHALATION TREATMENT: CPT | Mod: XB

## 2024-01-15 PROCEDURE — 20000000 HC ICU ROOM

## 2024-01-15 PROCEDURE — 83605 ASSAY OF LACTIC ACID: CPT | Performed by: EMERGENCY MEDICINE

## 2024-01-15 PROCEDURE — 5A09457 ASSISTANCE WITH RESPIRATORY VENTILATION, 24-96 CONSECUTIVE HOURS, CONTINUOUS POSITIVE AIRWAY PRESSURE: ICD-10-PCS | Performed by: STUDENT IN AN ORGANIZED HEALTH CARE EDUCATION/TRAINING PROGRAM

## 2024-01-15 PROCEDURE — 93005 ELECTROCARDIOGRAM TRACING: CPT

## 2024-01-15 PROCEDURE — 63600175 PHARM REV CODE 636 W HCPCS: Performed by: EMERGENCY MEDICINE

## 2024-01-15 PROCEDURE — 96367 TX/PROPH/DG ADDL SEQ IV INF: CPT

## 2024-01-15 PROCEDURE — 87040 BLOOD CULTURE FOR BACTERIA: CPT | Performed by: EMERGENCY MEDICINE

## 2024-01-15 PROCEDURE — 94761 N-INVAS EAR/PLS OXIMETRY MLT: CPT | Mod: XB

## 2024-01-15 PROCEDURE — 94640 AIRWAY INHALATION TREATMENT: CPT

## 2024-01-15 PROCEDURE — 83880 ASSAY OF NATRIURETIC PEPTIDE: CPT | Performed by: EMERGENCY MEDICINE

## 2024-01-15 PROCEDURE — 85610 PROTHROMBIN TIME: CPT | Performed by: EMERGENCY MEDICINE

## 2024-01-15 PROCEDURE — 96375 TX/PRO/DX INJ NEW DRUG ADDON: CPT

## 2024-01-15 PROCEDURE — 96365 THER/PROPH/DIAG IV INF INIT: CPT

## 2024-01-15 PROCEDURE — 87635 SARS-COV-2 COVID-19 AMP PRB: CPT | Performed by: EMERGENCY MEDICINE

## 2024-01-15 PROCEDURE — 25000242 PHARM REV CODE 250 ALT 637 W/ HCPCS: Performed by: EMERGENCY MEDICINE

## 2024-01-15 PROCEDURE — 85730 THROMBOPLASTIN TIME PARTIAL: CPT | Mod: 91 | Performed by: HOSPITALIST

## 2024-01-15 PROCEDURE — 63600175 PHARM REV CODE 636 W HCPCS: Performed by: HOSPITALIST

## 2024-01-15 PROCEDURE — 36600 WITHDRAWAL OF ARTERIAL BLOOD: CPT

## 2024-01-15 PROCEDURE — 27000190 HC CPAP FULL FACE MASK W/VALVE

## 2024-01-15 PROCEDURE — 81000 URINALYSIS NONAUTO W/SCOPE: CPT | Performed by: EMERGENCY MEDICINE

## 2024-01-15 PROCEDURE — 99291 CRITICAL CARE FIRST HOUR: CPT

## 2024-01-15 PROCEDURE — 85730 THROMBOPLASTIN TIME PARTIAL: CPT | Performed by: EMERGENCY MEDICINE

## 2024-01-15 PROCEDURE — 82803 BLOOD GASES ANY COMBINATION: CPT

## 2024-01-15 PROCEDURE — 84443 ASSAY THYROID STIM HORMONE: CPT | Performed by: HOSPITALIST

## 2024-01-15 RX ORDER — METOPROLOL SUCCINATE 50 MG/1
50 TABLET, EXTENDED RELEASE ORAL DAILY
Status: DISCONTINUED | OUTPATIENT
Start: 2024-01-16 | End: 2024-01-18 | Stop reason: HOSPADM

## 2024-01-15 RX ORDER — ACETAMINOPHEN 325 MG/1
650 TABLET ORAL EVERY 6 HOURS PRN
Status: DISCONTINUED | OUTPATIENT
Start: 2024-01-15 | End: 2024-01-18 | Stop reason: HOSPADM

## 2024-01-15 RX ORDER — HEPARIN SODIUM,PORCINE/D5W 25000/250
0-40 INTRAVENOUS SOLUTION INTRAVENOUS CONTINUOUS
Status: DISCONTINUED | OUTPATIENT
Start: 2024-01-15 | End: 2024-01-17

## 2024-01-15 RX ORDER — LORAZEPAM 2 MG/ML
0.5 INJECTION INTRAMUSCULAR
Status: COMPLETED | OUTPATIENT
Start: 2024-01-15 | End: 2024-01-15

## 2024-01-15 RX ORDER — ASPIRIN 325 MG
325 TABLET ORAL
Status: COMPLETED | OUTPATIENT
Start: 2024-01-15 | End: 2024-01-15

## 2024-01-15 RX ORDER — IPRATROPIUM BROMIDE AND ALBUTEROL SULFATE 2.5; .5 MG/3ML; MG/3ML
3 SOLUTION RESPIRATORY (INHALATION)
Status: COMPLETED | OUTPATIENT
Start: 2024-01-15 | End: 2024-01-15

## 2024-01-15 RX ORDER — METOPROLOL TARTRATE 1 MG/ML
5 INJECTION, SOLUTION INTRAVENOUS EVERY 6 HOURS
Status: DISCONTINUED | OUTPATIENT
Start: 2024-01-15 | End: 2024-01-15

## 2024-01-15 RX ORDER — NAPROXEN SODIUM 220 MG/1
81 TABLET, FILM COATED ORAL DAILY
Status: DISCONTINUED | OUTPATIENT
Start: 2024-01-16 | End: 2024-01-18 | Stop reason: HOSPADM

## 2024-01-15 RX ORDER — ONDANSETRON HYDROCHLORIDE 2 MG/ML
4 INJECTION, SOLUTION INTRAVENOUS EVERY 6 HOURS PRN
Status: DISCONTINUED | OUTPATIENT
Start: 2024-01-15 | End: 2024-01-18 | Stop reason: HOSPADM

## 2024-01-15 RX ORDER — NITROGLYCERIN 0.4 MG/1
0.4 TABLET SUBLINGUAL EVERY 5 MIN PRN
Status: DISCONTINUED | OUTPATIENT
Start: 2024-01-15 | End: 2024-01-18 | Stop reason: HOSPADM

## 2024-01-15 RX ORDER — FUROSEMIDE 10 MG/ML
20 INJECTION INTRAMUSCULAR; INTRAVENOUS
Status: COMPLETED | OUTPATIENT
Start: 2024-01-15 | End: 2024-01-15

## 2024-01-15 RX ORDER — CLOPIDOGREL BISULFATE 75 MG/1
75 TABLET ORAL DAILY
Status: DISCONTINUED | OUTPATIENT
Start: 2024-01-16 | End: 2024-01-18 | Stop reason: HOSPADM

## 2024-01-15 RX ORDER — INSULIN ASPART 100 [IU]/ML
0-5 INJECTION, SOLUTION INTRAVENOUS; SUBCUTANEOUS EVERY 6 HOURS PRN
Status: DISCONTINUED | OUTPATIENT
Start: 2024-01-15 | End: 2024-01-16

## 2024-01-15 RX ORDER — CLOPIDOGREL BISULFATE 300 MG/1
600 TABLET, FILM COATED ORAL
Status: COMPLETED | OUTPATIENT
Start: 2024-01-15 | End: 2024-01-15

## 2024-01-15 RX ORDER — FUROSEMIDE 10 MG/ML
60 INJECTION INTRAMUSCULAR; INTRAVENOUS 2 TIMES DAILY
Status: DISCONTINUED | OUTPATIENT
Start: 2024-01-15 | End: 2024-01-16

## 2024-01-15 RX ORDER — METOPROLOL TARTRATE 1 MG/ML
5 INJECTION, SOLUTION INTRAVENOUS
Status: COMPLETED | OUTPATIENT
Start: 2024-01-15 | End: 2024-01-15

## 2024-01-15 RX ORDER — MORPHINE SULFATE 4 MG/ML
2 INJECTION, SOLUTION INTRAMUSCULAR; INTRAVENOUS ONCE
Status: DISCONTINUED | OUTPATIENT
Start: 2024-01-15 | End: 2024-01-15

## 2024-01-15 RX ORDER — ATORVASTATIN CALCIUM 40 MG/1
80 TABLET, FILM COATED ORAL DAILY
Status: DISCONTINUED | OUTPATIENT
Start: 2024-01-16 | End: 2024-01-18 | Stop reason: HOSPADM

## 2024-01-15 RX ORDER — GLUCAGON 1 MG
1 KIT INJECTION
Status: DISCONTINUED | OUTPATIENT
Start: 2024-01-15 | End: 2024-01-16

## 2024-01-15 RX ORDER — FUROSEMIDE 10 MG/ML
40 INJECTION INTRAMUSCULAR; INTRAVENOUS
Status: COMPLETED | OUTPATIENT
Start: 2024-01-15 | End: 2024-01-15

## 2024-01-15 RX ADMIN — VANCOMYCIN HYDROCHLORIDE 2000 MG: 1 INJECTION, POWDER, LYOPHILIZED, FOR SOLUTION INTRAVENOUS at 03:01

## 2024-01-15 RX ADMIN — LORAZEPAM 0.5 MG: 2 INJECTION INTRAMUSCULAR; INTRAVENOUS at 08:01

## 2024-01-15 RX ADMIN — ASPIRIN 325 MG ORAL TABLET 325 MG: 325 PILL ORAL at 03:01

## 2024-01-15 RX ADMIN — FUROSEMIDE 20 MG: 10 INJECTION, SOLUTION INTRAMUSCULAR; INTRAVENOUS at 03:01

## 2024-01-15 RX ADMIN — AMIODARONE HYDROCHLORIDE 1 MG/MIN: 1.8 INJECTION, SOLUTION INTRAVENOUS at 06:01

## 2024-01-15 RX ADMIN — IPRATROPIUM BROMIDE AND ALBUTEROL SULFATE 3 ML: 2.5; .5 SOLUTION RESPIRATORY (INHALATION) at 03:01

## 2024-01-15 RX ADMIN — PIPERACILLIN AND TAZOBACTAM 4.5 G: 4; .5 INJECTION, POWDER, LYOPHILIZED, FOR SOLUTION INTRAVENOUS; PARENTERAL at 02:01

## 2024-01-15 RX ADMIN — FUROSEMIDE 40 MG: 10 INJECTION, SOLUTION INTRAVENOUS at 03:01

## 2024-01-15 RX ADMIN — CLOPIDOGREL BISULFATE 600 MG: 300 TABLET, FILM COATED ORAL at 03:01

## 2024-01-15 RX ADMIN — METOPROLOL TARTRATE 5 MG: 1 INJECTION, SOLUTION INTRAVENOUS at 02:01

## 2024-01-15 RX ADMIN — SODIUM CHLORIDE 500 ML: 9 INJECTION, SOLUTION INTRAVENOUS at 01:01

## 2024-01-15 RX ADMIN — FUROSEMIDE 60 MG: 10 INJECTION, SOLUTION INTRAMUSCULAR; INTRAVENOUS at 10:01

## 2024-01-15 RX ADMIN — PIPERACILLIN AND TAZOBACTAM 4.5 G: 4; .5 INJECTION, POWDER, LYOPHILIZED, FOR SOLUTION INTRAVENOUS; PARENTERAL at 10:01

## 2024-01-15 RX ADMIN — AMIODARONE HYDROCHLORIDE 150 MG: 1.5 INJECTION, SOLUTION INTRAVENOUS at 06:01

## 2024-01-15 RX ADMIN — INSULIN DETEMIR 10 UNITS: 100 INJECTION, SOLUTION SUBCUTANEOUS at 10:01

## 2024-01-15 RX ADMIN — HEPARIN SODIUM 12 UNITS/KG/HR: 10000 INJECTION, SOLUTION INTRAVENOUS at 02:01

## 2024-01-15 RX ADMIN — DOXYCYCLINE 100 MG: 100 INJECTION, POWDER, LYOPHILIZED, FOR SOLUTION INTRAVENOUS at 06:01

## 2024-01-15 RX ADMIN — LORAZEPAM 0.5 MG: 2 INJECTION INTRAMUSCULAR; INTRAVENOUS at 06:01

## 2024-01-15 NOTE — ED NOTES
Assumed care from LASHONDA Zelaya. Pt on bipap, restless. Hard to redirect but able to be. Respiratory and MD at bedside. Heparin and vancomycin currently running. Pt in Afib with rate approx 130 with hx of afib. Condom cath placed and explained to patient.

## 2024-01-15 NOTE — H&P
[facility] Medicine  History & Physical    Patient Name: Kamar Doshi  MRN: [id number]  Patient Class: IP- Inpatient  Admission Date: 1/15/2024  Attending Physician: Jacqueline Howe MD   Primary Care Provider: Bethel Harley MD         Patient information was obtained from patient, past medical records, and ER records.     Subjective:     Principal Problem:NSTEMI (non-ST elevated myocardial infarction)    Chief Complaint:   Chief Complaint   Patient presents with    Altered Mental Status     Pt reports to Ed via EMS for altered mental status for past couple of days. Pt follows commands, no verbal response just grunting.         HPI: Mr Kamar Doshi is a 73 y.o. man with CHF EF 10%, history of CVA, DM who presented with altered mental status. He is currently able to wake up on BiPAP and answers simple questions. He denies pain and denies chest pain. Further history from ED records- per wife, he has aphasia, had been less interactive/more lethargic over past few days. Presented to ED. Soiled on ED admit. Initial concern for sepsis for Afib RVR. Given metoprolol, vanc, zosyn, 500cc IVF bolus. Labs resulted with NSTEMI. Given asa 325, plavix 600, and started heparin gtt. Developed respiratory distress, started BiPAP, and given neb and total 60mg IV lasix. Admitted to ICU for further workup.     Past Medical History:   Diagnosis Date    Atrial fibrillation     Clotting disorder     Hyperlipidemia     Hypertension     Stroke     Vision loss 01/01/2019       Past Surgical History:   Procedure Laterality Date    BRAIN SURGERY  01/01/2018       Review of patient's allergies indicates:  No Known Allergies    No current facility-administered medications on file prior to encounter.     Current Outpatient Medications on File Prior to Encounter   Medication Sig    apixaban (ELIQUIS) 5 mg Tab Take 1 tablet (5 mg total) by mouth 2 (two) times daily.    atorvastatin (LIPITOR) 80 MG tablet  "Take 1 tablet (80 mg total) by mouth once daily. Take 1 tab by mouth every night    blood sugar diagnostic Strp To check BG 1 times daily, to use with insurance preferred meter    blood-glucose meter kit To check BG 1 times daily, to use with insurance preferred meter    brimonidine-timoloL (COMBIGAN) 0.2-0.5 % Drop Place 1 drop into the left eye 2 (two) times a day.    insulin (LANTUS SOLOSTAR U-100 INSULIN) glargine 100 units/mL (3mL) SubQ pen Inject 10 Units into the skin every evening.    lancets Misc To check BG 1 times daily, to use with insurance preferred meter    latanoprost 0.005 % ophthalmic solution Place 1 drop into both eyes nightly.    metFORMIN (GLUCOPHAGE) 500 MG tablet Take 1 tablet (500 mg total) by mouth daily with breakfast.    metoprolol succinate (TOPROL-XL) 50 MG 24 hr tablet Take 1 tablet (50 mg total) by mouth once daily.    pantoprazole (PROTONIX) 40 MG tablet TAKE 1 TABLET BY MOUTH ONCE DAILY IN THE MORNING BEFORE BREAKFAST    pen needle, diabetic 32 gauge x 5/32" Ndle Daily insulin administration     Family History       Problem Relation (Age of Onset)    Alcohol abuse Brother    Arthritis Mother, Father    COPD Brother    Heart disease Father    No Known Problems Sister, Sister    Peripheral vascular disease Sister          Tobacco Use    Smoking status: Former     Current packs/day: 0.00     Average packs/day: 1 pack/day for 56.0 years (56.0 ttl pk-yrs)     Types: Cigarettes     Start date:      Quit date: 2019     Years since quittin.0    Smokeless tobacco: Never   Substance and Sexual Activity    Alcohol use: No    Drug use: No    Sexual activity: Never     Review of Systems   Cardiovascular:  Negative for chest pain.   Gastrointestinal:  Negative for abdominal pain.   Psychiatric/Behavioral:  Positive for confusion.      Objective:     Vital Signs (Most Recent):  Temp: 98.3 °F (36.8 °C) (01/15/24 1230)  Pulse: (!) 117 (01/15/24 1555)  Resp: (!) 32 (01/15/24 1555)  BP: (!) " 151/114 (01/15/24 1544)  SpO2: 98 % (01/15/24 1549) Vital Signs (24h Range):  Temp:  [98.3 °F (36.8 °C)] 98.3 °F (36.8 °C)  Pulse:  [103-149] 117  Resp:  [18-32] 32  SpO2:  [91 %-98 %] 98 %  BP: (120-157)/() 151/114     Weight: 86.2 kg (190 lb)  Body mass index is 25.77 kg/m².     Physical Exam  Vitals and nursing note reviewed.   Constitutional:       General: He is in acute distress.      Appearance: He is ill-appearing. He is not diaphoretic.      Comments: Curled up on his side on BiPAP in ED stretcher.    HENT:      Head: Normocephalic and atraumatic.      Comments: BiPAP mask is on  Cardiovascular:      Rate and Rhythm: Tachycardia present. Rhythm irregular.      Comments: Afib RVR  Pulmonary:      Effort: Respiratory distress present.      Breath sounds: Rhonchi and rales present. No wheezing.      Comments: Increased effort. On bipap  Abdominal:      General: Bowel sounds are normal. There is no distension.      Palpations: Abdomen is soft. There is no mass.      Tenderness: There is no abdominal tenderness. There is no guarding.   Genitourinary:     Comments: Incontinence associated dermatitis in groin  Musculoskeletal:      Right lower leg: Edema present.      Left lower leg: No edema.   Skin:     General: Skin is warm and dry.   Neurological:      Mental Status: He is disoriented.      Comments: Able to nod yes/no to questions.                 Significant Labs: All pertinent labs within the past 24 hours have been reviewed.    Significant Imaging: I have reviewed all pertinent imaging results/findings within the past 24 hours.  Assessment/Plan:     * NSTEMI (non-ST elevated myocardial infarction)  Admitted with NSTEMI. Chest pain typical.   Recent Labs   Lab 01/15/24  1355   TROPONINI 44.515*     EKG Afib RVR   Started asa, plavix, heparin gtt, statin.   Trend troponins.   TTE pending.   Cardiology consulted.   Risk factors:   Lab Results   Component Value Date    HGBA1C 11.9 (H) 01/01/2024     LDL  97- above goal       Acute metabolic encephalopathy  At baseline he has aphasia per notes. This is due to prior strokes. He currently will wake up and answer yes/no questions with nod. CTH no acute changes but does show prior CVA  - monitor mental status  - check ABG      Leukocytosis  WBC elevated. Likely reactive to NSTEMI, but cannot rule out infection  - check COVID  - will cover for CAP with CTX, doxy      Acute hypoxemic respiratory failure  Patient admitted with Hypoxic which is Acute.  he is not on home oxygen. Signs/symptoms of respiratory failure include- tachypnea, increased work of breathing, respiratory distress, and use of accessory muscles present on admission.   - Labs and images were reviewed. Patient Has not has a recent ABG.   - Supplemental oxygen was provided and noted-  on BiPAP    - Respiratory failure is due to- CHF   - he has EF 10% now with NSTEMI. CXR with pulmonary edema. Suspect resp failure is due to CHF exacerbation. Continue IV lasix 60mg BID  - does have elevated WBC. Flu negative. COVID pending. Afebrile but could have component of CAP. Will cover with CTX/doxycycline for now.   - Pulmonary is consulted       Abdominal aortic aneurysm (AAA) without rupture on CT 3/4/21  Noted. Avoid fluoroquinolones    Acute on chronic combined systolic and diastolic heart failure  Patient admitted with shortness of breath, edema consistent with acute on chronic systolic and diastolic CHF. Cause of exacerbation is NSTEMI. Last TTE EF 10%    BNP  Recent Labs   Lab 01/15/24  1355   *     CXR: pulmonary edema  Recent Labs   Lab 01/15/24  1355   TROPONINI 44.515*     EKG personally reviewed and shows Afib RVR   Started CHF pathway  Start on IV lasix diuresis. Monitor ins and outs  TTE pending   Continue Bb  Cardiology consult         Chronic atrial fibrillation with RVR  Patient has Permanent atrial fibrillation which is uncontrolled. Patient is currently in atrial fibrillation.    Rate control:  "resume home metoprolol in AM if he can swallow. IV metoprolol 5mg Q6 ordered for now  Rhythm control: not needed- chronic Afib   Anticoagulation indicated. Anticoagulation done with heparin gtt. Note he has Rx for eliquis at home but does not take it.    [id number] - VASc score:  Congestive Heart Failure or EF < 35% YES  +1   Hypertension YES  +1   Age >= 75 NO   Diabetes Mellitus YES  +1   Stroke/TIA prior history YES  +2   Vascular disease (PAD, MI or Aortic Plaque)? Suspect yes   Age 65 - 74 YES  +1   Female NO   Total 6     Lab Results   Component Value Date    TSH 11.456 (H) 12/31/2023   - repeat TSH-- may need to consider levothyroxine      Type 2 diabetes mellitus with diabetic cataract, without long-term current use of insulin  Patient's FSGs are uncontrolled due to hyperglycemia on current medication regimen.  Last A1c reviewed-   Lab Results   Component Value Date    HGBA1C 11.9 (H) 01/01/2024     Most recent fingerstick glucose reviewed- No results for input(s): "POCTGLUCOSE" in the last 24 hours.  Current correctional scale  Low  Maintain anti-hyperglycemic dose as follows-   Antihyperglycemics (From admission, onward)      Start     Stop Route Frequency Ordered    01/15/24 2100  insulin detemir U-100 (Levemir) pen 10 Units         -- SubQ Nightly 01/15/24 1626    01/15/24 1727  insulin aspart U-100 pen 0-5 Units         -- SubQ Every 6 hours PRN 01/15/24 1627          Hold Oral hypoglycemics while patient is in the hospital.    History of embolic stroke L MCA; left basal ganglia; hospitalization [facility] 1/2019  Noted. Repeat CTH no acute changes. Has aphasia at baseline.   - continue statin, BP control  - heparin gtt for now given NSTEMI. Eliquis when NSTEMI resolved.         VTE Risk Mitigation (From admission, onward)           Ordered     IP VTE HIGH RISK PATIENT  Once         01/15/24 1626     Place sequential compression device  Until discontinued         01/15/24 1626     heparin 25,000 units in dextrose " 5% (100 units/ml) IV bolus from bag - ADDITIONAL PRN BOLUS - 60 units/kg  As needed (PRN)        Question:  Heparin Infusion Adjustment (DO NOT MODIFY ANSWER)  Answer:  [email or web address]\epic\Images\Pharmacy\HeparinInfusions\heparin LOW INTENSITY nomogram for [facility] [id number].pdf    01/15/24 1441     heparin 25,000 units in dextrose 5% (100 units/ml) IV bolus from bag - ADDITIONAL PRN BOLUS - 30 units/kg  As needed (PRN)        Question:  Heparin Infusion Adjustment (DO NOT MODIFY ANSWER)  Answer:  [email or web address]\epic\Images\Pharmacy\HeparinInfusions\heparin LOW INTENSITY nomogram for [facility] [id number].pdf    01/15/24 1441     heparin 25,000 units in dextrose 5% 250 mL (100 units/mL) infusion LOW INTENSITY nomogram - [facility]  Continuous        Question:  Begin at (units/kg/hr)  Answer:  12    01/15/24 1441                     Critical care time spent on the evaluation and treatment of severe organ dysfunction, review of pertinent labs and imaging studies, discussions with consulting providers and discussions with patient/family: 60 minutes       4:35 PM Called wife Cely Doshi to update her. Updated her on CHF exacerbation and NSTEMI.     Advance Care Planning     Date: 01/15/2024  Called his wife Cely. She is his next of kin. He does not have HCPOA document. She understands that she is his decision maker. She understands that he is critically ill. Discussed code status. Full code status. Time spent= 16 minutes            AdmissionCare    Guideline: Delirium, Inpatient    Based on the indications selected for the patient, the bed status of Admit to Inpatient was determined to be MET    The following indications were selected as present at the time of evaluation of the patient:      - Delirium of uncertain etiology that has not responded to appropriate treatment in emergency department or urgent care setting    AdmissionCare documentation entered by: Priscila Guillen    [facility] [place] [employer], 27th edition, Copyright © 2023 [facility] [place] [employer] [facility]  All Rights Reserved.  [id number]:01:49-06:00    Jacqueline Howe MD  Department of Hospital Medicine  [facility] [place] - Emergency Dept

## 2024-01-15 NOTE — CLINICAL REVIEW
Sepsis Screen (most recent)       Sepsis Screen () - 01/15/24 9175       Is the patient's history or complaint suggestive of a possible infection? Yes  -    Are there at least two of the following signs and symptoms present? Yes  -    Sepsis signs/symptoms - Tachycardia Tachycardia     >90  -    Sepsis signs/symptoms - WBC WBC < 4,000 or WBC > 12,000  -    Sepsis signs/symptoms - Altered Mental Status Altered Mental Status  -    Are any of the following organ dysfunction criteria present and not considered to be due to a chronic condition? Yes  -    Organ Dysfunction Criteria Lactate > 2.0  -    Organ Dysfunction Criteria - Resp Comp Respiratory Compromise: Requiring > 5L NC  -    Organ Dysfunction Criteria - O2 O2 Saturation < 95% on room air  -    Initiate Sepsis Protocol No  -    Reason sepsis not considered Pt. receiving appropriate management  -              User Key  (r) = Recorded By, (t) = Taken By, (c) = Cosigned By      Initials Name     Jenny Cruz RN

## 2024-01-15 NOTE — ASSESSMENT & PLAN NOTE
Severely reduced EF, now with troponin of 44 and elevated lactic, agree with cardiology consult  - continue diuresis, and rate control, BP control

## 2024-01-15 NOTE — ASSESSMENT & PLAN NOTE
Patient admitted with Hypoxic which is Acute.  he is not on home oxygen. Signs/symptoms of respiratory failure include- tachypnea, increased work of breathing, respiratory distress, and use of accessory muscles present on admission.   - Labs and images were reviewed. Patient Has not has a recent ABG.   - Supplemental oxygen was provided and noted-  on BiPAP    - Respiratory failure is due to- CHF   - he has EF 10% now with NSTEMI. CXR with pulmonary edema. Suspect resp failure is due to CHF exacerbation. Continue IV lasix 60mg BID  - does have elevated WBC. Flu negative. COVID pending. Afebrile but could have component of CAP. Will cover with CTX/doxycycline for now.   - Pulmonary is consulted

## 2024-01-15 NOTE — ASSESSMENT & PLAN NOTE
"Patient's FSGs are uncontrolled due to hyperglycemia on current medication regimen.  Last A1c reviewed-   Lab Results   Component Value Date    HGBA1C 11.9 (H) 01/01/2024     Most recent fingerstick glucose reviewed- No results for input(s): "POCTGLUCOSE" in the last 24 hours.  Current correctional scale  Low  Maintain anti-hyperglycemic dose as follows-   Antihyperglycemics (From admission, onward)      Start     Stop Route Frequency Ordered    01/15/24 2100  insulin detemir U-100 (Levemir) pen 10 Units         -- SubQ Nightly 01/15/24 1626    01/15/24 1727  insulin aspart U-100 pen 0-5 Units         -- SubQ Every 6 hours PRN 01/15/24 1627          Hold Oral hypoglycemics while patient is in the hospital.  "

## 2024-01-15 NOTE — Clinical Note
Diagnosis: Tachycardia [[id number]]   Future Attending Provider: CARMINA KAYE [[id number]]   Reason for IP Medical Treatment  (Clinical interventions that can only be accomplished in the IP setting? ) :: AMS   I certify that Inpatient services for greater than or equal to 2 midnights are medically necessary:: Yes   Plans for Post-Acute care--if anticipated (pick the single best option):: A. No post acute care anticipated at this time

## 2024-01-15 NOTE — ADMISSIONCARE
AdmissionCare    Guideline: Delirium, Inpatient    Based on the indications selected for the patient, the bed status of Admit to Inpatient was determined to be MET    The following indications were selected as present at the time of evaluation of the patient:      - Delirium of uncertain etiology that has not responded to appropriate treatment in emergency department or urgent care setting    AdmissionCare documentation entered by: Priscila Guillen    [facility], 27th edition, Copyright © 2023 [facility] [employer], [facility] All Rights Reserved.  [id number]:01:49-06:00

## 2024-01-15 NOTE — ASSESSMENT & PLAN NOTE
Rate uncontrolled at this time, agree with cardiology consult and attempting rate control to improve cardiac output.

## 2024-01-15 NOTE — ASSESSMENT & PLAN NOTE
Admitted with NSTEMI. Chest pain typical.   Recent Labs   Lab 01/15/24  1355   TROPONINI 44.515*     EKG Afib RVR   Started asa, plavix, heparin gtt, statin.   Trend troponins.   TTE pending.   Cardiology consulted.   Risk factors:   Lab Results   Component Value Date    HGBA1C 11.9 (H) 01/01/2024     LDL 97- above goal

## 2024-01-15 NOTE — ED NOTES
O2 sat 91% on NRB although pt still restless and c/o sob. +diaphoresis.  Denies CP.  Dr Case and resp notified.

## 2024-01-15 NOTE — HPI
Mr. Doshi is a 74 yo with severe HFrEF (EF 10% Jan. 1st), atrial fibrillation, uncontrolled DM, previous CVAs with residual deficits that presented with AMS and was found to have uncontrolled atrial fibrillation, troponin of 44, decompensated HF with pulmonary edema. This history is obtained from our ER staff and chart review. He is intermittently able to answer questions but not reliably.   He arrived very disheveled and dirty and his wife reported that they hadn't gotten any of his medications since he left last time.

## 2024-01-15 NOTE — ED PROVIDER NOTES
Encounter Date: 1/15/2024    SCRIBE #1 NOTE: Oc BARONE, am scribing for, and in the presence of,  Maegan Case MD.       History     Chief Complaint   Patient presents with    Altered Mental Status     Pt reports to Ed via EMS for altered mental status for past couple of days. Pt follows commands, no verbal response just grunting.      Mr. Kamar Doshi presents to ED for altered mental status that occurred over the past few days. Unknown of exact onset d/t patient's AMS. History provided by independent historian, EMS reports that they were activated by patient's wife for frequent falls and AMS. On arrival, they report that the patient smelled of urine and that his house was disheveled.They reports that the patient has a possible UTI. They note that the patient has a hx of high glucose in the past. Patient's wife denies any acute changes today. No known medications taken PTA. No alleviating or exacerbating factors noted. Denies abdominal pain, CP or any associated symptoms. Patient's PMHx is significant for Afib, HLD, HTN, and stroke.       The history is provided by the EMS personnel and a significant other. The history is limited by the condition of the patient. No  was used.     Review of patient's allergies indicates:  No Known Allergies  Past Medical History:   Diagnosis Date    Atrial fibrillation     CHF (congestive heart failure)     Clotting disorder     Diabetes mellitus     Hyperlipidemia     Hypertension     Stroke     Vision loss 01/01/2019     Past Surgical History:   Procedure Laterality Date    BRAIN SURGERY  01/01/2018     Family History   Problem Relation Age of Onset    Arthritis Mother     Arthritis Father     Heart disease Father     No Known Problems Sister     COPD Brother     No Known Problems Sister     Peripheral vascular disease Sister     Alcohol abuse Brother      Social History     Tobacco Use    Smoking status: Former     Current packs/day: 0.00      Average packs/day: 1 pack/day for 56.0 years (56.0 ttl pk-yrs)     Types: Cigarettes     Start date:      Quit date: 2019     Years since quittin.0    Smokeless tobacco: Never   Substance Use Topics    Alcohol use: No    Drug use: No     Review of Systems   Unable to perform ROS: Mental status change   Cardiovascular:  Negative for chest pain.   Gastrointestinal:  Negative for abdominal pain.   All other systems reviewed and are negative.      Physical Exam     Initial Vitals [01/15/24 1230]   BP Pulse Resp Temp SpO2   (!) 153/93 (!) 124 18 98.3 °F (36.8 °C) 97 %      MAP       --         Physical Exam    Nursing note and vitals reviewed.  Constitutional: He appears well-developed and well-nourished. He is not diaphoretic. No distress.   Socks removed with caked on dirt on bilateral feet with suspected feces. Underwear is dirty and caked with urine and suspected feces. Questionable dried vomit on chest wall. 3/4 bag NS given en route by EMS.    HENT:   Head: Normocephalic and atraumatic.   Eyes: EOM are normal. Pupils are equal, round, and reactive to light.   Cardiovascular:  Regular rhythm and normal heart sounds.   Tachycardia present.         Pulmonary/Chest: Breath sounds normal. No respiratory distress. He has no wheezes.   Abdominal: Abdomen is soft. He exhibits no distension. There is no abdominal tenderness.   Musculoskeletal:         General: No tenderness or edema. Normal range of motion.      Comments: Moves all extremities symmetrically.      Neurological: He is alert.   Orientated to person but not to place or time. Inconsistently follows simple commands. Able to raise both arms off bed. Bends hips and knees symmetrically, able to lift both legs off bed. Able to sit up with good tone. Gait not tested.    Skin: Skin is warm and dry.   Psychiatric: He has a normal mood and affect. His behavior is normal. Thought content normal.   Looks around the room confused.          ED Course   Critical  Care    Date/Time: 1/15/2024 4:58 PM    Performed by: Maegan Case MD  Authorized by: Maegan Case MD  Direct patient critical care time: 15 minutes  Additional history critical care time: 5 minutes  Ordering / reviewing critical care time: 5 minutes  Documentation critical care time: 10 minutes  Consulting other physicians critical care time: 5 minutes  Consult with family critical care time: 4 minutes  Total critical care time (exclusive of procedural time) : 44 minutes        Labs Reviewed   CBC W/ AUTO DIFFERENTIAL - Abnormal; Notable for the following components:       Result Value    WBC 13.80 (*)     Gran # (ANC) 11.2 (*)     Immature Grans (Abs) 0.06 (*)     Mono # 1.1 (*)     Gran % 81.2 (*)     Lymph % 10.4 (*)     All other components within normal limits   COMPREHENSIVE METABOLIC PANEL - Abnormal; Notable for the following components:    CO2 22 (*)     Glucose 258 (*)     Total Bilirubin 1.9 (*)      (*)     All other components within normal limits   LACTIC ACID, PLASMA - Abnormal; Notable for the following components:    Lactate (Lactic Acid) 3.4 (*)     All other components within normal limits   URINALYSIS, REFLEX TO URINE CULTURE - Abnormal; Notable for the following components:    Protein, UA 2+ (*)     Glucose, UA 4+ (*)     Ketones, UA 1+ (*)     Occult Blood UA 1+ (*)     All other components within normal limits    Narrative:     Specimen Source->Urine   B-TYPE NATRIURETIC PEPTIDE - Abnormal; Notable for the following components:     (*)     All other components within normal limits   TROPONIN I - Abnormal; Notable for the following components:    Troponin I 44.515 (*)     All other components within normal limits   PROTIME-INR - Abnormal; Notable for the following components:    Prothrombin Time 12.6 (*)     All other components within normal limits   URINALYSIS MICROSCOPIC - Abnormal; Notable for the following components:    RBC, UA 5 (*)     Granular Casts, UA 2  (*)     All other components within normal limits    Narrative:     Specimen Source->Urine   ISTAT LACTATE - Abnormal; Notable for the following components:    POC Lactate 3.14 (*)     All other components within normal limits   ISTAT PROCEDURE - Abnormal; Notable for the following components:    POC PCO2 34.0 (*)     POC PO2 327 (*)     POC HCO3 19.2 (*)     POC BE -5 (*)     POC TCO2 20 (*)     All other components within normal limits   POCT GLUCOSE - Abnormal; Notable for the following components:    POCT Glucose 378 (*)     All other components within normal limits   CULTURE, BLOOD   CULTURE, BLOOD   MAGNESIUM   LIPASE   APTT   APTT   TSH   LACTIC ACID, PLASMA   TROPONIN I   TROPONIN I   POCT INFLUENZA A/B MOLECULAR   SARS-COV-2 RDRP GENE   POCT GLUCOSE MONITORING CONTINUOUS     EKG Readings: (Independently Interpreted)   Atrial fibrillation. RBBB. Rate of 130. ST changes in V3 and V4 that is not seen on prior EKG.        Imaging Results              X-Ray Chest AP Portable (Final result)  Result time 01/15/24 14:43:52      Final result by Sammy Carreon MD (01/15/24 14:43:52)                   Impression:      Bilateral, symmetric pulmonary parenchymal ground-glass attenuation with increased interstitial opacities, findings that can be seen the setting of pulmonary edema, infection or inflammation.    Stable enlargement of the cardiac silhouette.      Electronically signed by: Sammy Carreon MD  Date:    01/15/2024  Time:    14:43               Narrative:    EXAMINATION:  XR CHEST AP PORTABLE    CLINICAL HISTORY:  Sepsis;    TECHNIQUE:  Single frontal view of the chest was performed.    COMPARISON:  Radiograph 03/04/2021.    FINDINGS:  Cardiac monitoring leads project over the bilateral hemithoraces.  Mediastinal structures are midline.  Mild prominence of the central pulmonary vasculature.  Cardiac silhouette remains enlarged.  Lung volumes are symmetric.  Bilateral, symmetric pulmonary parenchymal  ground-glass attenuation with increased interstitial opacities.  No consolidation.  No pneumothorax or large pleural effusion.  No free air beneath the diaphragm.  No acute osseous abnormalities.  Visualized soft tissues appear within normal limits.                                       CT Head Without Contrast (Final result)  Result time 01/15/24 13:24:31      Final result by Rahul Pereira DO (01/15/24 13:24:31)                   Impression:      No acute intracranial findings as detailed above specifically without evidence for acute intracranial hemorrhage or sulcal effacement to suggest large territory recent infarction.    Large encephalomalacia centered about the left basal ganglia suggestive for remote infarction.    Clinical correlation and further evaluation as warranted.      Electronically signed by: Rahul Pereira DO  Date:    01/15/2024  Time:    13:24               Narrative:    EXAMINATION:  CT HEAD WITHOUT CONTRAST    CLINICAL HISTORY:  Mental status change, unknown cause;    TECHNIQUE:  Multiple sequential 5 mm axial images of the head without contrast.  Coronal and sagittal reformatted imaging from the axial acquisition.    COMPARISON:  01/01/2024    FINDINGS:  Study is distorted by motion artifacts.  No evidence for acute intracranial hemorrhage or sulcal effacement.  There is a large region of encephalomalacia left centrum semiovale extending to the basal ganglia with compensatory enlargement of the left lateral ventricle suggestive for remote infarction    Study slightly distorted by motion.  Few scattered opacities in the ethmoid air cells likely represents mucosal thickening remaining visualized paranasal sinuses and mastoid air cells are clear    Probable partial cerumen opacification right external auditory canal                                       Medications   piperacillin-tazobactam (ZOSYN) 4.5 g in dextrose 5 % in water (D5W) 100 mL IVPB (MB+) (0 g Intravenous Stopped 1/15/24 4186)    heparin 25,000 units in dextrose 5% 250 mL (100 units/mL) infusion LOW INTENSITY nomogram - [facility] (12 Units/kg/hr × 81 kg (Adjusted) Intravenous New Bag 1/15/24 1454)   heparin 25,000 units in dextrose 5% (100 units/ml) IV bolus from bag - ADDITIONAL PRN BOLUS - 60 units/kg (has no administration in time range)   heparin 25,000 units in dextrose 5% (100 units/ml) IV bolus from bag - ADDITIONAL PRN BOLUS - 30 units/kg (has no administration in time range)   atorvastatin tablet 80 mg (has no administration in time range)   insulin detemir U-100 (Levemir) pen 10 Units (has no administration in time range)   metoprolol succinate (TOPROL-XL) 24 hr tablet 50 mg (has no administration in time range)   aspirin chewable tablet 81 mg (has no administration in time range)   nitroGLYCERIN SL tablet 0.4 mg (has no administration in time range)   clopidogreL tablet 75 mg (has no administration in time range)   glucagon (human recombinant) injection 1 mg (has no administration in time range)   insulin aspart U-100 pen 0-5 Units (has no administration in time range)   dextrose 10% bolus 125 mL 125 mL (has no administration in time range)   dextrose 10% bolus 250 mL 250 mL (has no administration in time range)   metoprolol injection 5 mg (has no administration in time range)   furosemide injection 60 mg (has no administration in time range)   cefTRIAXone (ROCEPHIN) 2 g in dextrose 5 % in water (D5W) 100 mL IVPB (MB+) (has no administration in time range)   doxycycline (VIBRAMYCIN) 100 mg in dextrose 5 % in water (D5W) 100 mL IVPB (MB+) (has no administration in time range)   acetaminophen tablet 650 mg (has no administration in time range)   ondansetron injection 4 mg (has no administration in time range)   sodium chloride 0.9% bolus 500 mL 500 mL (0 mLs Intravenous Stopped 1/15/24 1501)   vancomycin (VANCOCIN) 2,000 mg in dextrose 5 % (D5W) 500 mL IVPB (2,000 mg Intravenous New Bag 1/15/24 1501)   metoprolol injection 5 mg (5 mg  Intravenous Given 1/15/24 1405)   heparin 25,000 units in dextrose 5% (100 units/ml) IV bolus from bag INITIAL BOLUS (4,860 Units Intravenous Bolus from Bag 1/15/24 1455)   furosemide injection 40 mg (40 mg Intravenous Given 1/15/24 1505)   aspirin tablet 325 mg (325 mg Oral Given 1/15/24 1506)   clopidogreL tablet 600 mg (600 mg Oral Given 1/15/24 1506)   furosemide injection 20 mg (20 mg Intravenous Given 1/15/24 1537)   albuterol-ipratropium 2.5 mg-0.5 mg/3 mL nebulizer solution 3 mL (3 mLs Nebulization Given 1/15/24 1555)     Medical Decision Making  Initially suspect sepsis, workup initiated. Pt got 2/3 bag NS en route. 500cc NS ordered. Prior records reviewed, pt has EF 10-15% by 1/1/24 echo.     Wife arrived to bedside. She reports he got up around midnight to try to use restroom and was stumbling about and couldn't get up and walk at his baseline. She reports he got back in bed and slept and she found him on the floor at home later this morning by the bed after she had gone to get something to eat. She reports his baseline is holding on to things b/c their space is too small for a walker and he won't use a cane. She reports he hasn't been taking any meds since discharge b/c of problems at [facility].     Also of note, wife reports hx of one cataract and needs glasses to see, but forgot them at home.     Per last TTE done on Jan. 1st, 2023, patient has severely reduced systolic function with a visually estimated ejection fraction of 10 -15%.    Pt has returned from CT. No acute findings per radiology. Difficulty getting EKG per nursing due to machine problems. Initial EKG obtained, 13:40. Changes in v3/v4 compared w prior. Pt has a known hx of afib and RBBB. Obtaining repeat EKG now. Pt denies any chest pain or any pain. Pt is in no resp distress. All labs pending except lactate 3. Maegan Case MD, 01/15/2024 1:55 PM    Prior records reviewed. Metoprolol ordered, pt has responded well to that in past. Pt has  not taken any meds for 2 weeks.     Dr. Sommer, cardiology,  came to bedside to see pt. Trop returned, 44. Pt resting comfortably. Cxr w early possible pulm edema. Lasix ordered. Asa, plavix, heparin. Spoke w neurology, Dr. Ramesh, via pfc. Ok to heparinize, benefits outweigh risks. I spoke w wife and updated her on results. She reports she needs to leave to take care of another family member. I requested to ensure her phone number is on file to keep her posted.     Nursing called me to bedside. Pt now sitting up, new acute resp distress, new tachypnea. Has purewick w urine output. Pt placed on NRB, additional lasix ordered. Duoneb, bipap ordered.      Pt improving on bipap. Remains lucid near suspected baseline with clinical improvement. Will be admitted to ICU for further close monitoring and further care. HM and pulm/crit physicians came to ED to evaluate pt.   Vitals at admission: , 117/86, 100% on 60%fio2 bipap, RR18.     Amount and/or Complexity of Data Reviewed  Independent Historian: spouse and EMS  External Data Reviewed: ECG and notes.     Details: See MDM.  Labs: ordered. Decision-making details documented in ED Course.  Radiology: ordered. Decision-making details documented in ED Course.  ECG/medicine tests: ordered and independent interpretation performed. Decision-making details documented in ED Course.    Risk  OTC drugs.  Prescription drug management.            Scribe Attestation:   Scribe #1: I performed the above scribed service and the documentation accurately describes the services I performed. I attest to the accuracy of the note.                               Clinical Impression:  Final diagnoses:  [R00.0] Tachycardia  [R46.89] Altered behavior  [I21.4] NSTEMI (non-ST elevated myocardial infarction)  [R06.03] Acute respiratory distress (Primary)               I, Maegan Case MD, personally performed the services described in this documentation. All medical record entries made by the  scribe were at my direction and in my presence. I have reviewed the chart and agree that the record reflects my personal performance and is accurate and complete.             ED Disposition Condition    Admit Serious                Maegan Case MD  01/15/24 2973

## 2024-01-15 NOTE — ASSESSMENT & PLAN NOTE
Patient has Permanent atrial fibrillation which is uncontrolled. Patient is currently in atrial fibrillation.    Rate control: resume home metoprolol in AM if he can swallow. IV metoprolol 5mg Q6 ordered for now  Rhythm control: not needed- chronic Afib   Anticoagulation indicated. Anticoagulation done with heparin gtt. Note he has Rx for eliquis at home but does not take it.    [id number] - VASc score:  Congestive Heart Failure or EF < 35% YES  +1   Hypertension YES  +1   Age >= 75 NO   Diabetes Mellitus YES  +1   Stroke/TIA prior history YES  +2   Vascular disease (PAD, MI or Aortic Plaque)? Suspect yes   Age 65 - 74 YES  +1   Female NO   Total 6     Lab Results   Component Value Date    TSH 11.456 (H) 12/31/2023   - repeat TSH-- may need to consider levothyroxine

## 2024-01-15 NOTE — HPI
Mr Kamar Doshi is a 73 y.o. man with CHF EF 10%, history of CVA, DM who presented with altered mental status. He is currently able to wake up on BiPAP and answers simple questions. He denies pain and denies chest pain. Further history from ED records- per wife, he has aphasia, had been less interactive/more lethargic over past few days. Presented to ED. Soiled on ED admit. Initial concern for sepsis for Afib RVR. Given metoprolol, vanc, zosyn, 500cc IVF bolus. Labs resulted with NSTEMI. Given asa 325, plavix 600, and started heparin gtt. Developed respiratory distress, started BiPAP, and given neb and total 60mg IV lasix. Admitted to ICU for further workup.

## 2024-01-15 NOTE — ASSESSMENT & PLAN NOTE
Noted. Repeat CTH no acute changes. Has aphasia at baseline.   - continue statin, BP control  - heparin gtt for now given NSTEMI. Eliquis when NSTEMI resolved.

## 2024-01-15 NOTE — ASSESSMENT & PLAN NOTE
At baseline he has aphasia per notes. This is due to prior strokes. He currently will wake up and answer yes/no questions with nod. CTH no acute changes but does show prior CVA  - monitor mental status  - check ABG

## 2024-01-15 NOTE — SUBJECTIVE & OBJECTIVE
Past Medical History:   Diagnosis Date    Atrial fibrillation     CHF (congestive heart failure)     Clotting disorder     Diabetes mellitus     Hyperlipidemia     Hypertension     Stroke     Vision loss 2019       Past Surgical History:   Procedure Laterality Date    BRAIN SURGERY  2018       Review of patient's allergies indicates:  No Known Allergies    Family History       Problem Relation (Age of Onset)    Alcohol abuse Brother    Arthritis Mother, Father    COPD Brother    Heart disease Father    No Known Problems Sister, Sister    Peripheral vascular disease Sister          Tobacco Use    Smoking status: Former     Current packs/day: 0.00     Average packs/day: 1 pack/day for 56.0 years (56.0 ttl pk-yrs)     Types: Cigarettes     Start date:      Quit date:      Years since quittin.0    Smokeless tobacco: Never   Substance and Sexual Activity    Alcohol use: No    Drug use: No    Sexual activity: Never         Review of Systems   Unable to perform ROS: Acuity of condition     Objective:     Vital Signs (Most Recent):  Temp: 98.3 °F (36.8 °C) (01/15/24 1230)  Pulse: (!) 121 (01/15/24 1620)  Resp: 19 (01/15/24 1620)  BP: (!) 155/84 (01/15/24 1620)  SpO2: 100 % (01/15/24 1620) Vital Signs (24h Range):  Temp:  [98.3 °F (36.8 °C)] 98.3 °F (36.8 °C)  Pulse:  [103-149] 121  Resp:  [18-32] 19  SpO2:  [91 %-100 %] 100 %  BP: (120-157)/() 155/84     Weight: 86.2 kg (190 lb)  Body mass index is 25.77 kg/m².      Intake/Output Summary (Last 24 hours) at 1/15/2024 1625  Last data filed at 1/15/2024 1501  Gross per 24 hour   Intake 800 ml   Output --   Net 800 ml        Physical Exam  Constitutional:       Appearance: He is ill-appearing and toxic-appearing.   Eyes:      Conjunctiva/sclera: Conjunctivae normal.      Pupils: Pupils are equal, round, and reactive to light.   Cardiovascular:      Rate and Rhythm: Tachycardia present. Rhythm irregular.      Heart sounds: No murmur  heard.  Pulmonary:      Comments: On bipap with intermittent tachypnea, course breath sounds but no wheezing or rales on my exam     Abdominal:      General: Abdomen is flat. Bowel sounds are normal.   Skin:     General: Skin is warm and dry.      Capillary Refill: Capillary refill takes less than 2 seconds.   Neurological:      Comments: Will arouse and answer questions at the time of my exam, slow to answer          Vents:  Oxygen Concentration (%): 100 (01/15/24 1546)    Lines/Drains/Airways       Peripheral Intravenous Line  Duration                  Peripheral IV - Single Lumen 01/15/24 1519 20 G Right Antecubital <1 day         Peripheral IV - Single Lumen 01/15/24 18 G Distal;Left;Posterior Forearm <1 day                    Significant Labs:    CBC/Anemia Profile:  Recent Labs   Lab 01/15/24  1355   WBC 13.80*   HGB 15.9   HCT 48.8      MCV 91   RDW 12.2        Chemistries:  Recent Labs   Lab 01/15/24  1355      K 4.6      CO2 22*   BUN 13   CREATININE 1.0   CALCIUM 10.0   ALBUMIN 4.2   PROT 8.2   BILITOT 1.9*   ALKPHOS 73   ALT 24   *   MG 1.8       All pertinent labs within the past 24 hours have been reviewed.    Significant Imaging:   I have reviewed all pertinent imaging results/findings within the past 24 hours.

## 2024-01-15 NOTE — CONSULTS
[facility]  Critical Care Medicine  Consult Note    Patient Name: Kamar Doshi  MRN: [id number]  Admission Date: 1/15/2024  Hospital Length of Stay: 0 days  Code Status: Full Code  Attending Physician: Jacqueline Howe MD   Primary Care Provider: Bethel Harley MD   Principal Problem: NSTEMI (non-ST elevated myocardial infarction)    Inpatient consult to Pulmonology  Consult performed by: Johanna Shook MD  Consult ordered by: Jacqueline Howe MD        Subjective:     HPI:  Mr. Doshi is a 72 yo with severe HFrEF (EF 10% ), atrial fibrillation, uncontrolled DM, previous CVAs with residual deficits that presented with AMS and was found to have uncontrolled atrial fibrillation, troponin of 44, decompensated HF with pulmonary edema. This history is obtained from our ER staff and chart review. He is intermittently able to answer questions but not reliably.   He arrived very disheveled and dirty and his wife reported that they hadn't gotten any of his medications since he left last time.     Hospital/ICU Course:  No notes on file    Past Medical History:   Diagnosis Date    Atrial fibrillation     CHF (congestive heart failure)     Clotting disorder     Diabetes mellitus     Hyperlipidemia     Hypertension     Stroke     Vision loss 2019       Past Surgical History:   Procedure Laterality Date    BRAIN SURGERY  2018       Review of patient's allergies indicates:  No Known Allergies    Family History       Problem Relation (Age of Onset)    Alcohol abuse Brother    Arthritis Mother, Father    COPD Brother    Heart disease Father    No Known Problems Sister, Sister    Peripheral vascular disease Sister          Tobacco Use    Smoking status: Former     Current packs/day: 0.00     Average packs/day: 1 pack/day for 56.0 years (56.0 ttl pk-yrs)     Types: Cigarettes     Start date:      Quit date: 2019     Years since quittin.0    Smokeless tobacco: Never   Substance and  Sexual Activity    Alcohol use: No    Drug use: No    Sexual activity: Never         Review of Systems   Unable to perform ROS: Acuity of condition     Objective:     Vital Signs (Most Recent):  Temp: 98.3 °F (36.8 °C) (01/15/24 1230)  Pulse: (!) 121 (01/15/24 1620)  Resp: 19 (01/15/24 1620)  BP: (!) 155/84 (01/15/24 1620)  SpO2: 100 % (01/15/24 1620) Vital Signs (24h Range):  Temp:  [98.3 °F (36.8 °C)] 98.3 °F (36.8 °C)  Pulse:  [103-149] 121  Resp:  [18-32] 19  SpO2:  [91 %-100 %] 100 %  BP: (120-157)/() 155/84     Weight: 86.2 kg (190 lb)  Body mass index is 25.77 kg/m².      Intake/Output Summary (Last 24 hours) at 1/15/2024 1625  Last data filed at 1/15/2024 1501  Gross per 24 hour   Intake 800 ml   Output --   Net 800 ml        Physical Exam  Constitutional:       Appearance: He is ill-appearing and toxic-appearing.   Eyes:      Conjunctiva/sclera: Conjunctivae normal.      Pupils: Pupils are equal, round, and reactive to light.   Cardiovascular:      Rate and Rhythm: Tachycardia present. Rhythm irregular.      Heart sounds: No murmur heard.  Pulmonary:      Comments: On bipap with intermittent tachypnea, course breath sounds but no wheezing or rales on my exam     Abdominal:      General: Abdomen is flat. Bowel sounds are normal.   Skin:     General: Skin is warm and dry.      Capillary Refill: Capillary refill takes less than 2 seconds.   Neurological:      Comments: Will arouse and answer questions at the time of my exam, slow to answer          Vents:  Oxygen Concentration (%): 100 (01/15/24 1546)    Lines/Drains/Airways       Peripheral Intravenous Line  Duration                  Peripheral IV - Single Lumen 01/15/24 1519 20 G Right Antecubital <1 day         Peripheral IV - Single Lumen 01/15/24 18 G Distal;Left;Posterior Forearm <1 day                    Significant Labs:    CBC/Anemia Profile:  Recent Labs   Lab 01/15/24  1355   WBC 13.80*   HGB 15.9   HCT 48.8      MCV 91   RDW 12.2         Chemistries:  Recent Labs   Lab 01/15/24  1355      K 4.6      CO2 22*   BUN 13   CREATININE 1.0   CALCIUM 10.0   ALBUMIN 4.2   PROT 8.2   BILITOT 1.9*   ALKPHOS 73   ALT 24   *   MG 1.8       All pertinent labs within the past 24 hours have been reviewed.    Significant Imaging:   I have reviewed all pertinent imaging results/findings within the past 24 hours.    ABG  Recent Labs   Lab 01/15/24  1626   PH 7.361   PO2 327*   PCO2 34.0*   HCO3 19.2*   BE -5*     Assessment/Plan:     Pulmonary  Acute hypoxemic respiratory failure  No previously diagnosed pulmonary problems but does have some history of smoking  - all his current issues are likely related to his acutely decompensated heart failure and new NSTEMI  - bipap for now and will continue to wean as tolerated  -recommend bipap overnight and reassess in the am.  - if he decompensates and requires intubation he will be very high risk for chuyita-intubation arrest     Cardiac/Vascular  Acute on chronic combined systolic and diastolic heart failure  Severely reduced EF, now with troponin of 44 and elevated lactic, agree with cardiology consult  - continue diuresis, and rate control, BP control     Chronic atrial fibrillation with RVR  Rate uncontrolled at this time, agree with cardiology consult and attempting rate control to improve cardiac output.     Endocrine  Type 2 diabetes mellitus with diabetic cataract, without long-term current use of insulin  Uncontrolled DM is and will complicate his care in all aspects.          Critical Care Time: 40 minutes  Critical secondary to Patient has a condition that poses threat to life and bodily function: Acute Myocardial Infarction and Severe Respiratory Distress     Critical care was time spent personally by me on the following activities: development of treatment plan with patient or surrogate and bedside caregivers, discussions with consultants, evaluation of patient's response to treatment,  examination of patient, ordering and performing treatments and interventions, ordering and review of laboratory studies, ordering and review of radiographic studies, pulse oximetry, re-evaluation of patient's condition. This critical care time did not overlap with that of any other provider or involve time for any procedures.    Thank you for your consult. I will follow-up with patient. Please contact us if you have any additional questions.     Johanna Shook MD  Critical Care Medicine  [facility] [place] [facility]

## 2024-01-15 NOTE — ED NOTES
"Pt with a history of a CVA, only deficit is expressive aphasia. Afib, cataracts.  Pt lives at home with his wife who states pt ambulates on own at home. Pt sent to ED by wife who states pt has "not been himself" over last few days, was lethargic this am and did not want to get out of bed.  Pt denies any complaints.  Pt presents w/ dried feces on bilat feet and strong odor of urine.  Pt given bed bath.  Redness noted to right groin, left elbow and sacrum along w/ a small blister.  PUP pad placed to sacrum and bilat heels.  Pt placed in a diaper w/ an external male catheter device. Pt is AAOx3, occasionally misused words.  Pt follows commands, resp even and unlabored, skin warm and dry.  HOB elevated, SR up x 2, call bell within reach.   "

## 2024-01-15 NOTE — ASSESSMENT & PLAN NOTE
WBC elevated. Likely reactive to NSTEMI, but cannot rule out infection  - check COVID  - will cover for CAP with CTX, doxy

## 2024-01-15 NOTE — ASSESSMENT & PLAN NOTE
Patient admitted with shortness of breath, edema consistent with acute on chronic systolic and diastolic CHF. Cause of exacerbation is NSTEMI. Last TTE EF 10%    BNP  Recent Labs   Lab 01/15/24  1355   *     CXR: pulmonary edema  Recent Labs   Lab 01/15/24  1355   TROPONINI 44.515*     EKG personally reviewed and shows Afib RVR   Started CHF pathway  Start on IV lasix diuresis. Monitor ins and outs  TTE pending   Continue Bb  Cardiology consult

## 2024-01-15 NOTE — ASSESSMENT & PLAN NOTE
No previously diagnosed pulmonary problems but does have some history of smoking  - all his current issues are likely related to his acutely decompensated heart failure and new NSTEMI  - bipap for now and will continue to wean as tolerated  -recommend bipap overnight and reassess in the am.  - if he decompensates and requires intubation he will be very high risk for chuyita-intubation arrest

## 2024-01-15 NOTE — SUBJECTIVE & OBJECTIVE
"Past Medical History:   Diagnosis Date    Atrial fibrillation     Clotting disorder     Hyperlipidemia     Hypertension     Stroke     Vision loss 01/01/2019       Past Surgical History:   Procedure Laterality Date    BRAIN SURGERY  01/01/2018       Review of patient's allergies indicates:  No Known Allergies    No current facility-administered medications on file prior to encounter.     Current Outpatient Medications on File Prior to Encounter   Medication Sig    apixaban (ELIQUIS) 5 mg Tab Take 1 tablet (5 mg total) by mouth 2 (two) times daily.    atorvastatin (LIPITOR) 80 MG tablet Take 1 tablet (80 mg total) by mouth once daily. Take 1 tab by mouth every night    blood sugar diagnostic Strp To check BG 1 times daily, to use with insurance preferred meter    blood-glucose meter kit To check BG 1 times daily, to use with insurance preferred meter    brimonidine-timoloL (COMBIGAN) 0.2-0.5 % Drop Place 1 drop into the left eye 2 (two) times a day.    insulin (LANTUS SOLOSTAR U-100 INSULIN) glargine 100 units/mL (3mL) SubQ pen Inject 10 Units into the skin every evening.    lancets Misc To check BG 1 times daily, to use with insurance preferred meter    latanoprost 0.005 % ophthalmic solution Place 1 drop into both eyes nightly.    metFORMIN (GLUCOPHAGE) 500 MG tablet Take 1 tablet (500 mg total) by mouth daily with breakfast.    metoprolol succinate (TOPROL-XL) 50 MG 24 hr tablet Take 1 tablet (50 mg total) by mouth once daily.    pantoprazole (PROTONIX) 40 MG tablet TAKE 1 TABLET BY MOUTH ONCE DAILY IN THE MORNING BEFORE BREAKFAST    pen needle, diabetic 32 gauge x 5/32" Ndle Daily insulin administration     Family History       Problem Relation (Age of Onset)    Alcohol abuse Brother    Arthritis Mother, Father    COPD Brother    Heart disease Father    No Known Problems Sister, Sister    Peripheral vascular disease Sister          Tobacco Use    Smoking status: Former     Current packs/day: 0.00     Average " packs/day: 1 pack/day for 56.0 years (56.0 ttl pk-yrs)     Types: Cigarettes     Start date:      Quit date: 2019     Years since quittin.0    Smokeless tobacco: Never   Substance and Sexual Activity    Alcohol use: No    Drug use: No    Sexual activity: Never     Review of Systems   Cardiovascular:  Negative for chest pain.   Gastrointestinal:  Negative for abdominal pain.   Psychiatric/Behavioral:  Positive for confusion.      Objective:     Vital Signs (Most Recent):  Temp: 98.3 °F (36.8 °C) (01/15/24 1230)  Pulse: (!) 117 (01/15/24 1555)  Resp: (!) 32 (01/15/24 1555)  BP: (!) 151/114 (01/15/24 1544)  SpO2: 98 % (01/15/24 1549) Vital Signs (24h Range):  Temp:  [98.3 °F (36.8 °C)] 98.3 °F (36.8 °C)  Pulse:  [103-149] 117  Resp:  [18-32] 32  SpO2:  [91 %-98 %] 98 %  BP: (120-157)/() 151/114     Weight: 86.2 kg (190 lb)  Body mass index is 25.77 kg/m².     Physical Exam  Vitals and nursing note reviewed.   Constitutional:       General: He is in acute distress.      Appearance: He is ill-appearing. He is not diaphoretic.      Comments: Curled up on his side on BiPAP in ED stretcher.    HENT:      Head: Normocephalic and atraumatic.      Comments: BiPAP mask is on  Cardiovascular:      Rate and Rhythm: Tachycardia present. Rhythm irregular.      Comments: Afib RVR  Pulmonary:      Effort: Respiratory distress present.      Breath sounds: Rhonchi and rales present. No wheezing.      Comments: Increased effort. On bipap  Abdominal:      General: Bowel sounds are normal. There is no distension.      Palpations: Abdomen is soft. There is no mass.      Tenderness: There is no abdominal tenderness. There is no guarding.   Genitourinary:     Comments: Incontinence associated dermatitis in groin  Musculoskeletal:      Right lower leg: Edema present.      Left lower leg: No edema.   Skin:     General: Skin is warm and dry.   Neurological:      Mental Status: He is disoriented.      Comments: Able to nod yes/no  to questions.                 Significant Labs: All pertinent labs within the past 24 hours have been reviewed.    Significant Imaging: I have reviewed all pertinent imaging results/findings within the past 24 hours.

## 2024-01-16 LAB
ALBUMIN SERPL BCP-MCNC: 3.4 G/DL (ref 3.5–5.2)
ALP SERPL-CCNC: 56 U/L (ref 55–135)
ALT SERPL W/O P-5'-P-CCNC: 19 U/L (ref 10–44)
ANION GAP SERPL CALC-SCNC: 11 MMOL/L (ref 8–16)
APTT PPP: 37.9 SEC (ref 21–32)
APTT PPP: 44.1 SEC (ref 21–32)
APTT PPP: 47.3 SEC (ref 21–32)
APTT PPP: 47.3 SEC (ref 21–32)
ASCENDING AORTA: 3.69 CM
AST SERPL-CCNC: 44 U/L (ref 10–40)
AV INDEX (PROSTH): 0.63
AV MEAN GRADIENT: 2 MMHG
AV PEAK GRADIENT: 4 MMHG
AV VALVE AREA BY VELOCITY RATIO: 2.98 CM²
AV VALVE AREA: 2.61 CM²
AV VELOCITY RATIO: 0.72
BASOPHILS # BLD AUTO: 0.06 K/UL (ref 0–0.2)
BASOPHILS NFR BLD: 0.5 % (ref 0–1.9)
BILIRUB SERPL-MCNC: 1 MG/DL (ref 0.1–1)
BUN SERPL-MCNC: 14 MG/DL (ref 8–23)
CALCIUM SERPL-MCNC: 9 MG/DL (ref 8.7–10.5)
CHLORIDE SERPL-SCNC: 99 MMOL/L (ref 95–110)
CO2 SERPL-SCNC: 26 MMOL/L (ref 23–29)
CREAT SERPL-MCNC: 1 MG/DL (ref 0.5–1.4)
CV ECHO LV RWT: 0.3 CM
DIFFERENTIAL METHOD BLD: ABNORMAL
DOP CALC AO PEAK VEL: 0.94 M/S
DOP CALC AO VTI: 14.5 CM
DOP CALC LVOT AREA: 4.1 CM2
DOP CALC LVOT DIAMETER: 2.29 CM
DOP CALC LVOT PEAK VEL: 0.68 M/S
DOP CALC LVOT STROKE VOLUME: 37.87 CM3
DOP CALCLVOT PEAK VEL VTI: 9.2 CM
E WAVE DECELERATION TIME: 86.47 MSEC
E/A RATIO: 3.13
E/E' RATIO: 24 M/S
ECHO LV POSTERIOR WALL: 0.94 CM (ref 0.6–1.1)
EOSINOPHIL # BLD AUTO: 0 K/UL (ref 0–0.5)
EOSINOPHIL NFR BLD: 0.2 % (ref 0–8)
ERYTHROCYTE [DISTWIDTH] IN BLOOD BY AUTOMATED COUNT: 12.3 % (ref 11.5–14.5)
EST. GFR  (NO RACE VARIABLE): >60 ML/MIN/1.73 M^2
FRACTIONAL SHORTENING: 11 % (ref 28–44)
GLUCOSE SERPL-MCNC: 199 MG/DL (ref 70–110)
HCT VFR BLD AUTO: 44.3 % (ref 40–54)
HGB BLD-MCNC: 14.6 G/DL (ref 14–18)
IMM GRANULOCYTES # BLD AUTO: 0.04 K/UL (ref 0–0.04)
IMM GRANULOCYTES NFR BLD AUTO: 0.3 % (ref 0–0.5)
INTERVENTRICULAR SEPTUM: 0.97 CM (ref 0.6–1.1)
IVC DIAMETER: 2.06 CM
IVRT: 116.08 MSEC
LA MAJOR: 7.08 CM
LA MINOR: 6.76 CM
LA WIDTH: 4.4 CM
LACTATE SERPL-SCNC: 2.7 MMOL/L (ref 0.5–2.2)
LEFT ATRIUM SIZE: 4.95 CM
LEFT ATRIUM VOLUME: 128.04 CM3
LEFT INTERNAL DIMENSION IN SYSTOLE: 5.61 CM (ref 2.1–4)
LEFT VENTRICLE DIASTOLIC VOLUME: 202.9 ML
LEFT VENTRICLE SYSTOLIC VOLUME: 154.13 ML
LEFT VENTRICULAR INTERNAL DIMENSION IN DIASTOLE: 6.32 CM (ref 3.5–6)
LEFT VENTRICULAR MASS: 254.36 G
LV LATERAL E/E' RATIO: 24 M/S
LV SEPTAL E/E' RATIO: 24 M/S
LVOT MG: 1.08 MMHG
LVOT MV: 0.49 CM/S
LYMPHOCYTES # BLD AUTO: 2.8 K/UL (ref 1–4.8)
LYMPHOCYTES NFR BLD: 21.8 % (ref 18–48)
MAGNESIUM SERPL-MCNC: 1.7 MG/DL (ref 1.6–2.6)
MCH RBC QN AUTO: 29.8 PG (ref 27–31)
MCHC RBC AUTO-ENTMCNC: 33 G/DL (ref 32–36)
MCV RBC AUTO: 90 FL (ref 82–98)
MONOCYTES # BLD AUTO: 1.4 K/UL (ref 0.3–1)
MONOCYTES NFR BLD: 10.9 % (ref 4–15)
MV PEAK A VEL: 0.23 M/S
MV PEAK E VEL: 0.72 M/S
MV STENOSIS PRESSURE HALF TIME: 25.08 MS
MV VALVE AREA P 1/2 METHOD: 8.77 CM2
NEUTROPHILS # BLD AUTO: 8.6 K/UL (ref 1.8–7.7)
NEUTROPHILS NFR BLD: 66.3 % (ref 38–73)
NRBC BLD-RTO: 0 /100 WBC
PHOSPHATE SERPL-MCNC: 2.8 MG/DL (ref 2.7–4.5)
PISA TR MAX VEL: 1.98 M/S
PLATELET # BLD AUTO: 210 K/UL (ref 150–450)
PMV BLD AUTO: 10.1 FL (ref 9.2–12.9)
POCT GLUCOSE: 165 MG/DL (ref 70–110)
POCT GLUCOSE: 170 MG/DL (ref 70–110)
POCT GLUCOSE: 207 MG/DL (ref 70–110)
POCT GLUCOSE: 232 MG/DL (ref 70–110)
POCT GLUCOSE: 260 MG/DL (ref 70–110)
POCT GLUCOSE: 350 MG/DL (ref 70–110)
POCT GLUCOSE: 366 MG/DL (ref 70–110)
POTASSIUM SERPL-SCNC: 3.8 MMOL/L (ref 3.5–5.1)
PROT SERPL-MCNC: 6.9 G/DL (ref 6–8.4)
PV PEAK GRADIENT: 1 MMHG
PV PEAK VELOCITY: 0.58 M/S
RA MAJOR: 5.8 CM
RA PRESSURE ESTIMATED: 15 MMHG
RA WIDTH: 4 CM
RBC # BLD AUTO: 4.9 M/UL (ref 4.6–6.2)
RIGHT VENTRICULAR END-DIASTOLIC DIMENSION: 3.25 CM
RV TB RVSP: 17 MMHG
RV TISSUE DOPPLER FREE WALL SYSTOLIC VELOCITY 1 (APICAL 4 CHAMBER VIEW): 5.28 CM/S
SINUS: 3.62 CM
SODIUM SERPL-SCNC: 136 MMOL/L (ref 136–145)
STJ: 3.16 CM
TDI LATERAL: 0.03 M/S
TDI SEPTAL: 0.03 M/S
TDI: 0.03 M/S
TR MAX PG: 16 MMHG
TRICUSPID ANNULAR PLANE SYSTOLIC EXCURSION: 1.07 CM
TROPONIN I SERPL DL<=0.01 NG/ML-MCNC: 29.55 NG/ML (ref 0–0.03)
TV PEAK GRADIENT: 0 MMHG
TV REST PULMONARY ARTERY PRESSURE: 31 MMHG
WBC # BLD AUTO: 12.99 K/UL (ref 3.9–12.7)

## 2024-01-16 PROCEDURE — C1751 CATH, INF, PER/CENT/MIDLINE: HCPCS

## 2024-01-16 PROCEDURE — 25000003 PHARM REV CODE 250: Performed by: HOSPITALIST

## 2024-01-16 PROCEDURE — 02HV33Z INSERTION OF INFUSION DEVICE INTO SUPERIOR VENA CAVA, PERCUTANEOUS APPROACH: ICD-10-PCS | Performed by: STUDENT IN AN ORGANIZED HEALTH CARE EDUCATION/TRAINING PROGRAM

## 2024-01-16 PROCEDURE — 63600175 PHARM REV CODE 636 W HCPCS: Performed by: INTERNAL MEDICINE

## 2024-01-16 PROCEDURE — 63600175 PHARM REV CODE 636 W HCPCS: Performed by: HOSPITALIST

## 2024-01-16 PROCEDURE — 36415 COLL VENOUS BLD VENIPUNCTURE: CPT | Mod: XB | Performed by: STUDENT IN AN ORGANIZED HEALTH CARE EDUCATION/TRAINING PROGRAM

## 2024-01-16 PROCEDURE — 94761 N-INVAS EAR/PLS OXIMETRY MLT: CPT

## 2024-01-16 PROCEDURE — 85730 THROMBOPLASTIN TIME PARTIAL: CPT | Mod: 91 | Performed by: HOSPITALIST

## 2024-01-16 PROCEDURE — 85730 THROMBOPLASTIN TIME PARTIAL: CPT | Performed by: HOSPITALIST

## 2024-01-16 PROCEDURE — 99291 CRITICAL CARE FIRST HOUR: CPT | Mod: 25,,, | Performed by: INTERNAL MEDICINE

## 2024-01-16 PROCEDURE — 36415 COLL VENOUS BLD VENIPUNCTURE: CPT | Performed by: HOSPITALIST

## 2024-01-16 PROCEDURE — 36569 INSJ PICC 5 YR+ W/O IMAGING: CPT

## 2024-01-16 PROCEDURE — 84100 ASSAY OF PHOSPHORUS: CPT | Performed by: HOSPITALIST

## 2024-01-16 PROCEDURE — 99291 CRITICAL CARE FIRST HOUR: CPT | Mod: ,,, | Performed by: INTERNAL MEDICINE

## 2024-01-16 PROCEDURE — 84484 ASSAY OF TROPONIN QUANT: CPT | Performed by: HOSPITALIST

## 2024-01-16 PROCEDURE — 85730 THROMBOPLASTIN TIME PARTIAL: CPT | Mod: 91 | Performed by: STUDENT IN AN ORGANIZED HEALTH CARE EDUCATION/TRAINING PROGRAM

## 2024-01-16 PROCEDURE — 99900035 HC TECH TIME PER 15 MIN (STAT)

## 2024-01-16 PROCEDURE — 85025 COMPLETE CBC W/AUTO DIFF WBC: CPT | Performed by: HOSPITALIST

## 2024-01-16 PROCEDURE — 80053 COMPREHEN METABOLIC PANEL: CPT | Performed by: HOSPITALIST

## 2024-01-16 PROCEDURE — 27000221 HC OXYGEN, UP TO 24 HOURS

## 2024-01-16 PROCEDURE — 83605 ASSAY OF LACTIC ACID: CPT | Performed by: INTERNAL MEDICINE

## 2024-01-16 PROCEDURE — 94660 CPAP INITIATION&MGMT: CPT

## 2024-01-16 PROCEDURE — 25000003 PHARM REV CODE 250: Performed by: INTERNAL MEDICINE

## 2024-01-16 PROCEDURE — 63600175 PHARM REV CODE 636 W HCPCS: Performed by: EMERGENCY MEDICINE

## 2024-01-16 PROCEDURE — 20000000 HC ICU ROOM

## 2024-01-16 PROCEDURE — 83735 ASSAY OF MAGNESIUM: CPT | Performed by: HOSPITALIST

## 2024-01-16 RX ORDER — SODIUM CHLORIDE 0.9 % (FLUSH) 0.9 %
10 SYRINGE (ML) INJECTION
Status: DISCONTINUED | OUTPATIENT
Start: 2024-01-16 | End: 2024-01-18 | Stop reason: HOSPADM

## 2024-01-16 RX ORDER — INSULIN ASPART 100 [IU]/ML
0-10 INJECTION, SOLUTION INTRAVENOUS; SUBCUTANEOUS EVERY 4 HOURS PRN
Status: DISCONTINUED | OUTPATIENT
Start: 2024-01-16 | End: 2024-01-18 | Stop reason: HOSPADM

## 2024-01-16 RX ORDER — NOREPINEPHRINE BITARTRATE/D5W 4MG/250ML
0-3 PLASTIC BAG, INJECTION (ML) INTRAVENOUS CONTINUOUS
Status: DISCONTINUED | OUTPATIENT
Start: 2024-01-16 | End: 2024-01-17

## 2024-01-16 RX ORDER — NOREPINEPHRINE BITARTRATE/D5W 4MG/250ML
PLASTIC BAG, INJECTION (ML) INTRAVENOUS
Status: DISPENSED
Start: 2024-01-16 | End: 2024-01-16

## 2024-01-16 RX ORDER — GLUCAGON 1 MG
1 KIT INJECTION
Status: DISCONTINUED | OUTPATIENT
Start: 2024-01-16 | End: 2024-01-18 | Stop reason: HOSPADM

## 2024-01-16 RX ORDER — SODIUM CHLORIDE 0.9 % (FLUSH) 0.9 %
10 SYRINGE (ML) INJECTION EVERY 6 HOURS
Status: DISCONTINUED | OUTPATIENT
Start: 2024-01-17 | End: 2024-01-18 | Stop reason: HOSPADM

## 2024-01-16 RX ADMIN — PIPERACILLIN AND TAZOBACTAM 4.5 G: 4; .5 INJECTION, POWDER, LYOPHILIZED, FOR SOLUTION INTRAVENOUS; PARENTERAL at 06:01

## 2024-01-16 RX ADMIN — INSULIN ASPART 4 UNITS: 100 INJECTION, SOLUTION INTRAVENOUS; SUBCUTANEOUS at 04:01

## 2024-01-16 RX ADMIN — METOPROLOL SUCCINATE 50 MG: 50 TABLET, EXTENDED RELEASE ORAL at 09:01

## 2024-01-16 RX ADMIN — AMIODARONE HYDROCHLORIDE 0.5 MG/MIN: 1.8 INJECTION, SOLUTION INTRAVENOUS at 12:01

## 2024-01-16 RX ADMIN — NOREPINEPHRINE BITARTRATE 0.02 MCG/KG/MIN: 4 INJECTION, SOLUTION INTRAVENOUS at 12:01

## 2024-01-16 RX ADMIN — INSULIN ASPART 6 UNITS: 100 INJECTION, SOLUTION INTRAVENOUS; SUBCUTANEOUS at 04:01

## 2024-01-16 RX ADMIN — INSULIN DETEMIR 10 UNITS: 100 INJECTION, SOLUTION SUBCUTANEOUS at 09:01

## 2024-01-16 RX ADMIN — ATORVASTATIN CALCIUM 80 MG: 40 TABLET, FILM COATED ORAL at 09:01

## 2024-01-16 RX ADMIN — AMIODARONE HYDROCHLORIDE 0.5 MG/MIN: 1.8 INJECTION, SOLUTION INTRAVENOUS at 11:01

## 2024-01-16 RX ADMIN — INSULIN ASPART 4 UNITS: 100 INJECTION, SOLUTION INTRAVENOUS; SUBCUTANEOUS at 12:01

## 2024-01-16 RX ADMIN — ASPIRIN 81 MG CHEWABLE TABLET 81 MG: 81 TABLET CHEWABLE at 09:01

## 2024-01-16 RX ADMIN — FUROSEMIDE 60 MG: 10 INJECTION, SOLUTION INTRAMUSCULAR; INTRAVENOUS at 09:01

## 2024-01-16 RX ADMIN — INSULIN ASPART 4 UNITS: 100 INJECTION, SOLUTION INTRAVENOUS; SUBCUTANEOUS at 09:01

## 2024-01-16 RX ADMIN — INSULIN ASPART 2 UNITS: 100 INJECTION, SOLUTION INTRAVENOUS; SUBCUTANEOUS at 11:01

## 2024-01-16 RX ADMIN — DOXYCYCLINE 100 MG: 100 INJECTION, POWDER, LYOPHILIZED, FOR SOLUTION INTRAVENOUS at 04:01

## 2024-01-16 RX ADMIN — HEPARIN SODIUM 14 UNITS/KG/HR: 10000 INJECTION, SOLUTION INTRAVENOUS at 04:01

## 2024-01-16 RX ADMIN — CLOPIDOGREL BISULFATE 75 MG: 75 TABLET ORAL at 09:01

## 2024-01-16 RX ADMIN — CEFTRIAXONE 2 G: 2 INJECTION, POWDER, FOR SOLUTION INTRAMUSCULAR; INTRAVENOUS at 09:01

## 2024-01-16 NOTE — PLAN OF CARE
[place] - Intensive Care  Initial Discharge Assessment       Primary Care Provider: Bethel Harley MD  Case Management Assessment     PCP: see above  Pharmacy: [facility] on [address]    Patient Arrived From: Home with spouse  Existing Help at Home: spouse    Barriers to Discharge: none    Discharge Plan:    A. Home with family   B. Home Health (readmit within 30 days)      Discharge planning assessment completed with assistance from patient's spouse at the bedside.  Patient discharge on 12/30/2023 and was not able to get Rx from the pharmacy.  Patient's wife stated that [facility] did not have the Rxs.  Chart review shows that Rxs were sent to [facility] not [facility].  When medically cleared, patient might benefit from home health. He will need appointments as ordered by the discharging provder.        Admission Diagnosis: Acute respiratory distress [R06.03]  Tachycardia [R00.0]  Non-ST elevation myocardial infarction (NSTEMI) [I21.4]  NSTEMI (non-ST elevated myocardial infarction) [I21.4]  Altered behavior [R46.89]    Admission Date: 1/15/2024  Expected Discharge Date:     Transition of Care Barriers: None    Payor: [employer] [facility] / Plan: [employer] CHOICES / Product Type: Medicare Advantage /     Extended Emergency Contact Information  Primary Emergency Contact: Cely Doshi  Address: [address] [place] [address] [place] [address] [place]  Mobile Phone: [phone]  Relation: Spouse   needed? No    Discharge Plan A: Home with family  Discharge Plan B: Home Health (Readmit within 30 days)      [id number] [place] - [address] AT [facility] [address]  [address]  [place] [address]  Phone: [phone] Fax: [phone]    [id number] - [place] - [address]  [address]  [place] [address]  Phone: [phone] Fax: [phone]      Initial Assessment (most recent)       Adult Discharge  Assessment - 01/16/24 1447          Discharge Assessment    Assessment Type Discharge Planning Assessment     Confirmed/corrected address, phone number and insurance Yes     Confirmed Demographics Correct on Facesheet     Source of Information family;health record   Wife: Cely Doshi at the [facility];  [phone]    When was your last doctors appointment? --   1 week ago    Communicated NICHOLAS with patient/caregiver No     Reason For Admission Acute respiratory distress     People in Home spouse     Facility Arrived From: Home with spouse     Do you expect to return to your current living situation? Yes     Do you have help at home or someone to help you manage your care at home? Yes     Who are your caregiver(s) and their phone number(s)? wife     Prior to hospitilization cognitive status: Unable to Assess     Current cognitive status: Unable to Assess   Asleep    Walking or Climbing Stairs Difficulty yes     Walking or Climbing Stairs ambulation difficulty, requires equipment     Mobility Management Walker     Dressing/Bathing Difficulty no     Equipment Currently Used at Home walker, rolling     Readmission within 30 days? Yes   Discharge 12/31/2023    Patient currently being followed by outpatient case management? No     Do you currently have service(s) that help you manage your care at home? No     Do you take prescription medications? Yes     Do you have prescription coverage? Yes     Coverage Payor: [employer] [facility]     Do you have any problems affording any of your prescribed medications? No     Is the patient taking medications as prescribed? no     If no, which medications is patient not taking? Did not get Rxs from pharmacy following  last discharge because [facility] didn't have them.  Chart review showed that Rxs were sent to [facility] not [facility].     Who is going to help you get home at discharge? wife     How do you get to doctors appointments? family or friend  will provide     Are you on dialysis? No     Do you take coumadin? No   apixaban    Discharge Plan A Home with family     Discharge Plan B Home Health   Readmit within 30 days    DME Needed Upon Discharge  other (see comments)   TBD    Discharge Plan discussed with: Spouse/sig other     Name(s) and Number(s) Cely Doshi;  [phone]     Transition of Care Barriers None        OTHER    Name(s) of People in Home wife                     Readmission Assessment (most recent)       Readmission Assessment - 01/16/24 1548          Readmission    Why were you readmitted? Alarmed about signs/symptoms     When you left the hospital where did you go? Home with Family     Did patient/caregiver refused recommended DC plan? No     When did you start not feeling well? Fell between the bed and wall.  Did not hit head.  Was unable to walk.     Did you try to manage your symptoms your self? No     Did you call anyone? No     Why? Emergent situation     Did you try to see or did see a doctor or nurse before you came? No     Did you have  a follow-up appointment on discharge? Yes     Did you go? No     Was this a planned readmission? No

## 2024-01-16 NOTE — SUBJECTIVE & OBJECTIVE
Interval History: Pt states he is feeling ok. Some confusion more than baseline per wife at bedside. No cp or sob reported.    Review of Systems  Objective:     Vital Signs (Most Recent):  Temp: 98 °F (36.7 °C) (01/16/24 0500)  Pulse: 97 (01/16/24 1015)  Resp: (!) 22 (01/16/24 1015)  BP: 95/75 (01/16/24 1015)  SpO2: 98 % (01/16/24 1015) Vital Signs (24h Range):  Temp:  [97 °F (36.1 °C)-98.7 °F (37.1 °C)] 98 °F (36.7 °C)  Pulse:  [] 97  Resp:  [8-40] 22  SpO2:  [81 %-100 %] 98 %  BP: ()/() 95/75     Weight: 82.1 kg (181 lb)  Body mass index is 24.55 kg/m².    Intake/Output Summary (Last 24 hours) at 1/16/2024 1039  Last data filed at 1/16/2024 1000  Gross per 24 hour   Intake 2383.67 ml   Output 2450 ml   Net -66.33 ml         Physical Exam      Constitutional:       General: He is in acute distress.      Appearance: He is ill-appearing. He is not diaphoretic.      Comments: Curled up on his side on NC  HENT:      Head: Normocephalic and atraumatic.   Cardiovascular:      Rate and Rhythm: Tachycardia present. Rhythm irregular.      Comments: Afib RVR  Pulmonary:      Effort: no resp distress or accessory muscle use     Comments: on NC   Abdominal:      General: Bowel sounds are normal. There is no distension.      Palpations: Abdomen is soft. There is no mass.      Tenderness: There is no abdominal tenderness. There is no guarding.   Musculoskeletal:      Right lower leg: Edema present.      Left lower leg: No edema.   Skin:     General: Skin is warm and dry.   Neurological:      Mental Status: He is disoriented.      Comments: Able to nod yes/no to questions.    Significant Labs: All pertinent labs within the past 24 hours have been reviewed.    Significant Imaging: I have reviewed all pertinent imaging results/findings within the past 24 hours.

## 2024-01-16 NOTE — EICU
BP unrecordable     In cold shock with feeble thready pulse    Plan:  Initiate norepinephrine infusion to keep SBP greater the 90 mm of HG.  Arterial line placement.

## 2024-01-16 NOTE — PROGRESS NOTES
[facility] Medicine  Progress Note    Patient Name: Kamar Doshi  MRN: [id number]  Patient Class: IP- Inpatient   Admission Date: 1/15/2024  Length of Stay: 1 days  Attending Physician: Freddy Kern III, MD  Primary Care Provider: Bethel Harley MD        Subjective:     Principal Problem:NSTEMI (non-ST elevated myocardial infarction)        HPI:  Mr Kamar Doshi is a 73 y.o. man with CHF EF 10%, history of CVA, DM who presented with altered mental status. He is currently able to wake up on BiPAP and answers simple questions. He denies pain and denies chest pain. Further history from ED records- per wife, he has aphasia, had been less interactive/more lethargic over past few days. Presented to ED. Soiled on ED admit. Initial concern for sepsis for Afib RVR. Given metoprolol, vanc, zosyn, 500cc IVF bolus. Labs resulted with NSTEMI. Given asa 325, plavix 600, and started heparin gtt. Developed respiratory distress, started BiPAP, and given neb and total 60mg IV lasix. Admitted to ICU for further workup.     Overview/Hospital Course:  No notes on file    Interval History: Pt states he is feeling ok. Some confusion more than baseline per wife at bedside. No cp or sob reported.    Review of Systems  Objective:     Vital Signs (Most Recent):  Temp: 98 °F (36.7 °C) (01/16/24 0500)  Pulse: 97 (01/16/24 1015)  Resp: (!) 22 (01/16/24 1015)  BP: 95/75 (01/16/24 1015)  SpO2: 98 % (01/16/24 1015) Vital Signs (24h Range):  Temp:  [97 °F (36.1 °C)-98.7 °F (37.1 °C)] 98 °F (36.7 °C)  Pulse:  [] 97  Resp:  [8-40] 22  SpO2:  [81 %-100 %] 98 %  BP: ()/() 95/75     Weight: 82.1 kg (181 lb)  Body mass index is 24.55 kg/m².    Intake/Output Summary (Last 24 hours) at 1/16/2024 1039  Last data filed at 1/16/2024 1000  Gross per 24 hour   Intake 2383.67 ml   Output 2450 ml   Net -66.33 ml         Physical Exam      Constitutional:       General: He is in acute distress.       Appearance: He is ill-appearing. He is not diaphoretic.      Comments: Curled up on his side on NC  HENT:      Head: Normocephalic and atraumatic.   Cardiovascular:      Rate and Rhythm: Tachycardia present. Rhythm irregular.      Comments: Afib RVR  Pulmonary:      Effort: no resp distress or accessory muscle use     Comments: on NC   Abdominal:      General: Bowel sounds are normal. There is no distension.      Palpations: Abdomen is soft. There is no mass.      Tenderness: There is no abdominal tenderness. There is no guarding.   Musculoskeletal:      Right lower leg: Edema present.      Left lower leg: No edema.   Skin:     General: Skin is warm and dry.   Neurological:      Mental Status: He is disoriented.      Comments: Able to nod yes/no to questions.    Significant Labs: All pertinent labs within the past 24 hours have been reviewed.    Significant Imaging: I have reviewed all pertinent imaging results/findings within the past 24 hours.    Assessment/Plan:      * NSTEMI (non-ST elevated myocardial infarction)  Admitted with NSTEMI. Chest pain typical.   Recent Labs   Lab 01/16/24  0032   TROPONINI 29.547*       EKG Afib RVR   Started asa, plavix, heparin gtt, statin.   Trend troponins.   TTE pending.   Cardiology consulted.   Risk factors:   Lab Results   Component Value Date    HGBA1C 11.9 (H) 01/01/2024     LDL 97- above goal       A-fib  Cardiology consulted on heparin drip    Myocardial infarction  Cardiology consulted. Recommending medical management      Acute metabolic encephalopathy  At baseline he has aphasia per notes. This is due to prior strokes. He currently will wake up and answer yes/no questions with nod. CTH no acute changes but does show prior CVA  - monitor mental status        Leukocytosis  WBC elevated. Likely reactive to NSTEMI, but cannot rule out infection  - check COVID  - will cover for CAP with CTX, doxy      Acute hypoxemic respiratory failure  Patient admitted with Hypoxic which  is Acute.  he is not on home oxygen. Signs/symptoms of respiratory failure include- tachypnea, increased work of breathing, respiratory distress, and use of accessory muscles present on admission.   - Labs and images were reviewed. Patient Has not has a recent ABG.   - Supplemental oxygen was provided and noted-  on BiPAP    - Respiratory failure is due to- CHF   - he has EF 10% now with NSTEMI. CXR with pulmonary edema. Suspect resp failure is due to CHF exacerbation. Continue IV lasix 60mg BID  - does have elevated WBC. Flu negative. COVID pending. Afebrile but could have component of CAP. Will cover with CTX/doxycycline for now.   - Pulmonary is consulted       Abdominal aortic aneurysm (AAA) without rupture on CT 3/4/21  Noted. Avoid fluoroquinolones    Acute on chronic combined systolic and diastolic heart failure  Patient admitted with shortness of breath, edema consistent with acute on chronic systolic and diastolic CHF. Cause of exacerbation is NSTEMI. Last TTE EF 10%    BNP  Recent Labs   Lab 01/15/24  1355   *       CXR: pulmonary edema  Recent Labs   Lab 01/16/24  0032   TROPONINI 29.547*       EKG personally reviewed and shows Afib RVR   Started CHF pathway  Start on IV lasix diuresis. Monitor ins and outs  TTE pending   Continue Bb  Cardiology consult         Chronic atrial fibrillation with RVR  Patient has Permanent atrial fibrillation which is uncontrolled. Patient is currently in atrial fibrillation.    Rate control: resume home metoprolol in AM if he can swallow. IV metoprolol 5mg Q6 ordered for now  Rhythm control: not needed- chronic Afib   Anticoagulation indicated. Anticoagulation done with heparin gtt. Note he has Rx for eliquis at home but does not take it.    [id number] - VASc score:  Congestive Heart Failure or EF < 35% YES  +1   Hypertension YES  +1   Age >= 75 NO   Diabetes Mellitus YES  +1   Stroke/TIA prior history YES  +2   Vascular disease (PAD, MI or Aortic Plaque)? Suspect yes    Age 65 - 74 YES  +1   Female NO   Total 6     Lab Results   Component Value Date    TSH 8.398 (H) 01/15/2024   - Free t4 1.0      Type 2 diabetes mellitus with diabetic cataract, without long-term current use of insulin  Patient's FSGs are uncontrolled due to hyperglycemia on current medication regimen.  Last A1c reviewed-   Lab Results   Component Value Date    HGBA1C 11.9 (H) 01/01/2024     Most recent fingerstick glucose reviewed-   Recent Labs   Lab 01/15/24  2210 01/16/24  0016 01/16/24  0414 01/16/24  0852   POCTGLUCOSE 350* 366* 260* 232*     Current correctional scale  Low  Maintain anti-hyperglycemic dose as follows-   Antihyperglycemics (From admission, onward)      Start     Stop Route Frequency Ordered    01/16/24 0202  insulin aspart U-100 pen 0-10 Units         -- SubQ Every 4 hours PRN 01/16/24 0102    01/15/24 2100  insulin detemir U-100 (Levemir) pen 10 Units         -- SubQ Nightly 01/15/24 1626          Hold Oral hypoglycemics while patient is in the hospital.    History of embolic stroke L MCA; left basal ganglia; hospitalization [facility] 1/2019  Noted. Repeat CTH no acute changes. Has aphasia at baseline.   - continue statin, BP control  - heparin gtt for now given NSTEMI. Eliquis when NSTEMI resolved.         VTE Risk Mitigation (From admission, onward)           Ordered     IP VTE HIGH RISK PATIENT  Once         01/15/24 1626     Place sequential compression device  Until discontinued         01/15/24 1626     heparin 25,000 units in dextrose 5% (100 units/ml) IV bolus from bag - ADDITIONAL PRN BOLUS - 60 units/kg  As needed (PRN)        Question:  Heparin Infusion Adjustment (DO NOT MODIFY ANSWER)  Answer:  [email or web address]\Images\Pharmacy\HeparinInfusions\heparin LOW INTENSITY nomogram for [facility] [id number]    01/15/24 1441     heparin 25,000 units in dextrose 5% (100 units/ml) IV bolus from bag - ADDITIONAL PRN BOLUS - 30 units/kg  As needed (PRN)        Question:  Heparin Infusion Adjustment  (DO NOT MODIFY ANSWER)  Answer:  [email or web address]\epic\Images\Pharmacy\HeparinInfusions\heparin LOW INTENSITY nomogram for [facility] [id number].pdf    01/15/24 1441     heparin 25,000 units in dextrose 5% 250 mL (100 units/mL) infusion LOW INTENSITY nomogram - [facility]  Continuous        Question:  Begin at (units/kg/hr)  Answer:  12    01/15/24 1441                    Discharge Planning   NICHOLAS:      Code Status: Full Code   Is the patient medically ready for discharge?:     Reason for patient still in hospital (select all that apply): Laboratory test, Treatment, and Consult recommendations               Critical care time spent on the evaluation and treatment of severe organ dysfunction, review of pertinent labs and imaging studies, discussions with consulting providers and discussions with patient/family: 50 minutes.      Freddy Kern III, MD  Department of Hospital Medicine   [facility] - Intensive Care

## 2024-01-16 NOTE — PLAN OF CARE
The plan is to keep MAP >65, pending PICC line, to stay on hep drip, to monitor PTT, to monitor EKG and to monitor BG level.

## 2024-01-16 NOTE — ASSESSMENT & PLAN NOTE
Patient patient with late presentation of MI.  Aneurysmal changes on EKG, troponins initial at 44, downtrending to 35.  Also altered sensorium.  High-risk for intubation per critical care team and will not be able to tolerate any kind of procedures because of his altered sensorium.  Since this is late presentation of MI, and it is not having ongoing chest pain and the troponins are downtrending, the risk of coronary angiogram at the current time far exceeds any potential benefit.  Continue aspirin Plavix heparin drip.  Recheck 2D echo    1/16:  Continues to be chest pain-free.  Continue medical management

## 2024-01-16 NOTE — ASSESSMENT & PLAN NOTE
Patient has Permanent atrial fibrillation which is uncontrolled. Patient is currently in atrial fibrillation.    Rate control: resume home metoprolol in AM if he can swallow. IV metoprolol 5mg Q6 ordered for now  Rhythm control: not needed- chronic Afib   Anticoagulation indicated. Anticoagulation done with heparin gtt. Note he has Rx for eliquis at home but does not take it.    [id number] - VASc score:  Congestive Heart Failure or EF < 35% YES  +1   Hypertension YES  +1   Age >= 75 NO   Diabetes Mellitus YES  +1   Stroke/TIA prior history YES  +2   Vascular disease (PAD, MI or Aortic Plaque)? Suspect yes   Age 65 - 74 YES  +1   Female NO   Total 6     Lab Results   Component Value Date    TSH 8.398 (H) 01/15/2024   - Free t4 1.0

## 2024-01-16 NOTE — NURSING
Unable to obtain accurate blood pressure reading. Patient awakens to stimuli, pulses weak. Updated eICU and received orders for levophed, see mar.

## 2024-01-16 NOTE — ASSESSMENT & PLAN NOTE
No previously diagnosed pulmonary problems but does have some history of smoking  - all his current issues are likely related to his acutely decompensated heart failure and new NSTEMI  - improved today from yesterday, he would benefit from nightly NIV for his HF.   - can continue breathing treatments if he feels they help but no wheezing on my exams.

## 2024-01-16 NOTE — NURSING
Received patient from ER. Patient confused and thrashing in bed. Connected to ICU monitor, afib noted on monitor. RT placed patient on bipap. Ivs flushed and patent, amiodarone and heparin infusing, see mar. Bath and skin assessment complete. Bilateral groin sites excoriated, barrier cream applied. Mepilex placed to sacrum for preventive measures. Patient repositioned and turned. Call light within reach. Bed alarm on.

## 2024-01-16 NOTE — ASSESSMENT & PLAN NOTE
Last EF of 10%.  In 2018 had EF of 40-45%.  IV diuresis.  Will initiate guideline medical therapy when possible.

## 2024-01-16 NOTE — ASSESSMENT & PLAN NOTE
At baseline he has aphasia per notes. This is due to prior strokes. He currently will wake up and answer yes/no questions with nod. CTH no acute changes but does show prior CVA  - monitor mental status

## 2024-01-16 NOTE — SUBJECTIVE & OBJECTIVE
"Past Medical History:   Diagnosis Date    Atrial fibrillation     CHF (congestive heart failure)     Clotting disorder     Diabetes mellitus     Hyperlipidemia     Hypertension     Stroke     Vision loss 01/01/2019       Past Surgical History:   Procedure Laterality Date    BRAIN SURGERY  01/01/2018       Review of patient's allergies indicates:  No Known Allergies    No current facility-administered medications on file prior to encounter.     Current Outpatient Medications on File Prior to Encounter   Medication Sig    apixaban (ELIQUIS) 5 mg Tab Take 1 tablet (5 mg total) by mouth 2 (two) times daily.    atorvastatin (LIPITOR) 80 MG tablet Take 1 tablet (80 mg total) by mouth once daily. Take 1 tab by mouth every night    brimonidine-timoloL (COMBIGAN) 0.2-0.5 % Drop Place 1 drop into the left eye 2 (two) times a day.    insulin (LANTUS SOLOSTAR U-100 INSULIN) glargine 100 units/mL (3mL) SubQ pen Inject 10 Units into the skin every evening.    latanoprost 0.005 % ophthalmic solution Place 1 drop into both eyes nightly.    metFORMIN (GLUCOPHAGE) 500 MG tablet Take 1 tablet (500 mg total) by mouth daily with breakfast.    metoprolol succinate (TOPROL-XL) 50 MG 24 hr tablet Take 1 tablet (50 mg total) by mouth once daily.    pantoprazole (PROTONIX) 40 MG tablet TAKE 1 TABLET BY MOUTH ONCE DAILY IN THE MORNING BEFORE BREAKFAST    [DISCONTINUED] blood sugar diagnostic Strp To check BG 1 times daily, to use with insurance preferred meter    [DISCONTINUED] blood-glucose meter kit To check BG 1 times daily, to use with insurance preferred meter    [DISCONTINUED] lancets Misc To check BG 1 times daily, to use with insurance preferred meter    [DISCONTINUED] pen needle, diabetic 32 gauge x 5/32" Ndle Daily insulin administration     Family History       Problem Relation (Age of Onset)    Alcohol abuse Brother    Arthritis Mother, Father    COPD Brother    Heart disease Father    No Known Problems Sister, Sister    " Peripheral vascular disease Sister          Tobacco Use    Smoking status: Former     Current packs/day: 0.00     Average packs/day: 1 pack/day for 56.0 years (56.0 ttl pk-yrs)     Types: Cigarettes     Start date:      Quit date: 2019     Years since quittin.0    Smokeless tobacco: Never   Substance and Sexual Activity    Alcohol use: No    Drug use: No    Sexual activity: Never     Review of Systems   Unable to perform ROS: Mental status change   Cardiovascular:  Negative for chest pain.     Objective:     Vital Signs (Most Recent):  Temp: 98.7 °F (37.1 °C) (01/15/24 1656)  Pulse: 101 (01/15/24 2017)  Resp: 17 (01/15/24 2021)  BP: 121/84 (01/15/24 2017)  SpO2: 99 % (01/15/24 2017) Vital Signs (24h Range):  Temp:  [98.3 °F (36.8 °C)-98.7 °F (37.1 °C)] 98.7 °F (37.1 °C)  Pulse:  [] 101  Resp:  [12-40] 17  SpO2:  [91 %-100 %] 99 %  BP: ()/() 121/84     Weight: 86.2 kg (190 lb)  Body mass index is 25.77 kg/m².    SpO2: 99 %         Intake/Output Summary (Last 24 hours) at 1/15/2024 2125  Last data filed at 1/15/2024 2025  Gross per 24 hour   Intake 1550 ml   Output 900 ml   Net 650 ml       Lines/Drains/Airways       Peripheral Intravenous Line  Duration                  Peripheral IV - Single Lumen 01/15/24 1519 20 G Right Antecubital <1 day         Peripheral IV - Single Lumen 01/15/24 1706 20 G Posterior;Right Wrist <1 day         Peripheral IV - Single Lumen 01/15/24 18 G Distal;Left;Posterior Forearm <1 day                     Physical Exam  Vitals reviewed.   Constitutional:       Appearance: He is well-developed. He is ill-appearing and toxic-appearing.      Comments: Unkempt appearance.   HENT:      Head: Normocephalic.   Eyes:      Conjunctiva/sclera: Conjunctivae normal.   Cardiovascular:      Rate and Rhythm: Tachycardia present. Rhythm irregular.      Heart sounds: Normal heart sounds.   Pulmonary:      Effort: Respiratory distress present.   Abdominal:      General: Bowel sounds  are normal.      Palpations: Abdomen is soft.   Musculoskeletal:      Cervical back: Normal range of motion and neck supple.   Skin:     General: Skin is warm.   Neurological:      Mental Status: He is alert. He is disoriented.      Comments: Oriented only to self.          Significant Labs:         DATA:     Laboratory:  CBC:  Recent Labs   Lab 12/31/23  1447 12/31/23  1500 01/01/24  0235 01/15/24  1355   WBC 7.18  --  8.22 13.80 H   Hemoglobin 16.1  --  14.6 15.9   POC Hematocrit  --  55 H  --   --    Hematocrit 49.4  --  44.3 48.8   Platelets 263  --  247 225       CHEMISTRIES:  Recent Labs   Lab 03/04/21  0828 12/01/21  0959 12/31/23  1447 01/01/24  0235 01/15/24  1355   Glucose 267 H 394 H 275 H 278 H 258 H   Sodium 139 134 L 140 139 137   Potassium 4.1 5.1 3.6 4.0 4.6   BUN 20 11 9 10 13   Creatinine 1.2 1.1 1.1 1.0 1.0   eGFR if African American >60 >60.0  --   --   --    eGFR if non African American >60 >60.0  --   --   --    Calcium 9.7 10.1 10.1 9.3 10.0   Magnesium  --   --   --   --  1.8       CARDIAC BIOMARKERS:  Recent Labs   Lab 01/15/24  1355 01/15/24  1640 01/15/24  2014   Troponin I 44.515 H 40.118 H 35.094 H       COAGS:  Recent Labs   Lab 12/31/23  1447 01/15/24  1355   INR 1.1 1.2       LIPIDS/LFTS:  Recent Labs   Lab 12/01/21  0959 12/31/23  1447 01/01/24  0235 01/15/24  1355   Cholesterol 232 H 198  --   --    Triglycerides 275 H 319 H  --   --    HDL 40 37 L  --   --    LDL Cholesterol 137.0 97.2  --   --    Non-HDL Cholesterol 192 161  --   --    AST 20 19 15 100 H   ALT 23 14 16 24       Hemoglobin A1C   Date Value Ref Range Status   01/01/2024 11.9 (H) 4.0 - 5.6 % Final     Comment:     ADA Screening Guidelines:  5.7-6.4%  Consistent with prediabetes  >or=6.5%  Consistent with diabetes    High levels of fetal hemoglobin interfere with the HbA1C  assay. Heterozygous hemoglobin variants (HbS, HgC, etc)do  not significantly interfere with this assay.   However, presence of multiple variants  may affect accuracy.     12/01/2021 >14.0 (H) 4.0 - 5.6 % Final     Comment:     ADA Screening Guidelines:  5.7-6.4%  Consistent with prediabetes  >or=6.5%  Consistent with diabetes    High levels of fetal hemoglobin interfere with the HbA1C  assay. Heterozygous hemoglobin variants (HbS, HgC, etc)do  not significantly interfere with this assay.   However, presence of multiple variants may affect accuracy.     03/20/2019 7.3 (H) 4.0 - 5.6 % Final     Comment:     ADA Screening Guidelines:  5.7-6.4%  Consistent with prediabetes  >or=6.5%  Consistent with diabetes  High levels of fetal hemoglobin interfere with the HbA1C  assay. Heterozygous hemoglobin variants (HbS, HgC, etc)do  not significantly interfere with this assay.   However, presence of multiple variants may affect accuracy.         TSH  Recent Labs   Lab 12/31/23  1447 01/15/24  1640   TSH 11.456 H 8.398 H       The ASCVD Risk score (Melanie HEWITT, et al., 2019) failed to calculate for the following reasons:    The patient has a prior MI or stroke diagnosis       BNP    Lab Results   Component Value Date/Time     (H) 01/15/2024 01:55 PM    BNP 44 03/04/2021 08:28 AM            ECHO    Results for orders placed during the hospital encounter of 12/31/23    Echo    Interpretation Summary    Left Ventricle: The left ventricle is normal in size. Mildly increased wall thickness. There is mild concentric hypertrophy. Severe global hypokinesis present. There is severely reduced systolic function with a visually estimated ejection fraction of 10 -15%. Unable to assess diastolic function due to atrial fibrillation.    Right Ventricle: Mild right ventricular enlargement. Systolic function is severely reduced.    Mitral Valve: There is no stenosis. There is mild regurgitation.    Aorta: Aortic root is mildly dilated measuring 3.80 cm.    Pulmonary Artery: The estimated pulmonary artery systolic pressure is 32 mmHg.    Pt appears to be in AFib throughout  exam.      STRESS TEST    No results found for this or any previous visit.        CATH    No results found for this or any previous visit.

## 2024-01-16 NOTE — ASSESSMENT & PLAN NOTE
Patient admitted with shortness of breath, edema consistent with acute on chronic systolic and diastolic CHF. Cause of exacerbation is NSTEMI. Last TTE EF 10%    BNP  Recent Labs   Lab 01/15/24  1355   *       CXR: pulmonary edema  Recent Labs   Lab 01/16/24  0032   TROPONINI 29.547*       EKG personally reviewed and shows Afib RVR   Started CHF pathway  Start on IV lasix diuresis. Monitor ins and outs  TTE pending   Continue Bb  Cardiology consult

## 2024-01-16 NOTE — NURSING
[facility], [facility]  Nurses Note -- 4 Eyes      1/16/2024       Skin assessed on: Q Shift      [x] No Pressure Injuries Present    [x]Prevention Measures Documented    [] Yes LDA  for Pressure Injury Previously documented     [] Yes New Pressure Injury Discovered   [] LDA for New Pressure Injury Added      Attending RN:  Nilam Coburn, RN     Second RN:  Alma Barber RN

## 2024-01-16 NOTE — ASSESSMENT & PLAN NOTE
Patient's FSGs are uncontrolled due to hyperglycemia on current medication regimen.  Last A1c reviewed-   Lab Results   Component Value Date    HGBA1C 11.9 (H) 01/01/2024     Most recent fingerstick glucose reviewed-   Recent Labs   Lab 01/15/24  2210 01/16/24  0016 01/16/24  0414 01/16/24  0852   POCTGLUCOSE 350* 366* 260* 232*     Current correctional scale  Low  Maintain anti-hyperglycemic dose as follows-   Antihyperglycemics (From admission, onward)    Start     Stop Route Frequency Ordered    01/16/24 0202  insulin aspart U-100 pen 0-10 Units         -- SubQ Every 4 hours PRN 01/16/24 0102    01/15/24 2100  insulin detemir U-100 (Levemir) pen 10 Units         -- SubQ Nightly 01/15/24 1626        Hold Oral hypoglycemics while patient is in the hospital.

## 2024-01-16 NOTE — HPI
Patient is a pleasant 73-year-old man.  Cardiology consulted because of abnormal EKG.  Demonstrates anterior deep Q-waves with aneurysmal appearing changes.  History mostly obtained from the wife.  Patient only oriented to self, disheveled.  Wife brought patient to hospital because he had been not very responsive.  He denied any chest pains.  When he came initial concern by the ER team was sepsis, was given IV bolus which led to shortness of breath.  Subsequently IV diuresis was given.  And put on BiPAP.  Critical care also evaluated the patient and felt that patient would be very high-risk for chuyita intubation arrest and hence management with BiPAP was recommended.  Patient's troponin was 44 which has been downtrending.  This is consistent with his being a late presentation of MI without any chest pain, aneurysmal changes on EKG as well as downtrending troponins    Results for orders placed during the hospital encounter of 12/31/23    Echo    Interpretation Summary    Left Ventricle: The left ventricle is normal in size. Mildly increased wall thickness. There is mild concentric hypertrophy. Severe global hypokinesis present. There is severely reduced systolic function with a visually estimated ejection fraction of 10 -15%. Unable to assess diastolic function due to atrial fibrillation.    Right Ventricle: Mild right ventricular enlargement. Systolic function is severely reduced.    Mitral Valve: There is no stenosis. There is mild regurgitation.    Aorta: Aortic root is mildly dilated measuring 3.80 cm.    Pulmonary Artery: The estimated pulmonary artery systolic pressure is 32 mmHg.    Pt appears to be in AFib throughout exam.        HPI: Mr Kamar Doshi is a 73 y.o. man with CHF EF 10%, history of CVA, DM who presented with altered mental status. He is currently able to wake up on BiPAP and answers simple questions. He denies pain and denies chest pain. Further history from ED records- per wife, he has  aphasia, had been less interactive/more lethargic over past few days. Presented to ED. Soiled on ED admit. Initial concern for sepsis for Afib RVR. Given metoprolol, vanc, zosyn, 500cc IVF bolus. Labs resulted with NSTEMI. Given asa 325, plavix 600, and started heparin gtt. Developed respiratory distress, started BiPAP, and given neb and total 60mg IV lasix. Admitted to ICU for further workup.

## 2024-01-16 NOTE — EICU
73 year old male admitted with NSTEMI,Acute pulmonary edema requiring BIPAP support,atrial fibrillation in rapid ventricular rate requiring amiodarone infusion.  Cardiology consulted  Past H/O of atrial fibrillation on apixaban, CHF ejection fraction 10%, clotting disorder, diabetes mellitus, hyperlipidemia, hypertension, stroke..    Camera assessment revealed the patient on BiPAP support  Pulse rate 99 per minute, SpO2 100%, blood pressure 113/97, respiratory rate 20 per minute.    BiPAP 12/5, 60% FiO2, respiratory rate 16 per minute    Data  Troponin 44.515-> 40.118-> 35.094  EKG atrial fibrillation with rapid ventricular response with premature ventricular or aberrantly conducted complexes, 148 minute, right bundle-branch block, Q-waves in inferior leads  Chest x-ray with minimal diffuse interstitial changes.    Not 3.14   WBC 13.80   Prothrombin time 12.6   Localized over 58   Total bilirubin 1.9   Serum bicarbonate 22  AST//24      Echocardiogram December 2023 ejection fraction 10-15%, mild concentric hypertrophy, mild right ventricular enlargement with systolic function being severely reduced, mild mitral regurgitation, aortic root mildly dilated at 3.8 cm, pulmonary artery systolic pressure 32 mmHg  ABG 7.361/44/57  TSH 8.  398, FREE T4 NORMAL    ASSESSMENT AND PLAN   1. ALTERED MENTAL STATUS MAY BE RELATED TO HYPOXIA-continue BiPAP support, wean FiO2 as needed, may need morphine or haloperidol for maintaining BiPAP.  2. Non-STEMI-continue aspirin, atorvastatin, metoprolol, clopidogrel, heparin infusion, cardiology recommendations.    3. CHF-continue BiPAP support, diuresis as needed, heart failure regimen as recommended by Cardiology.  He has not on angiotensin-converting enzyme inhibitors or SGLT2 inhibitors at home.    4. Diabetes mellitus-maintain strict control   5. Hypertension-on metoprolol, Lasix, maintain strict blood pressure control.    6. Atrial fibrillation -on amiodarone infusion  and heparin infusion.  7. 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    DVT prophylaxis on heparin infusion.

## 2024-01-16 NOTE — EICU
Glucose 350<-[phone]<-378    On low dose aspart scale every 6 hours    Plan:  Initiated moderate dose scale every 4 hours

## 2024-01-16 NOTE — SUBJECTIVE & OBJECTIVE
Interval History: Mr. Doshi is feeling better today and is able to answer questions. He denies any shortness of breath or chest pain currently.      Objective:     Vital Signs (Most Recent):  Temp: 98.4 °F (36.9 °C) (01/16/24 1115)  Pulse: 87 (01/16/24 1145)  Resp: (!) 25 (01/16/24 1145)  BP: 110/69 (01/16/24 1145)  SpO2: 96 % (01/16/24 1145) Vital Signs (24h Range):  Temp:  [97 °F (36.1 °C)-98.7 °F (37.1 °C)] 98.4 °F (36.9 °C)  Pulse:  [] 87  Resp:  [8-40] 25  SpO2:  [81 %-100 %] 96 %  BP: ()/() 110/69     Weight: 82.1 kg (181 lb)  Body mass index is 24.55 kg/m².      Intake/Output Summary (Last 24 hours) at 1/16/2024 1504  Last data filed at 1/16/2024 1400  Gross per 24 hour   Intake 2073.27 ml   Output 2950 ml   Net -876.73 ml        Physical Exam  Vitals reviewed.   Constitutional:       General: He is not in acute distress.     Appearance: Normal appearance.   HENT:      Head: Normocephalic and atraumatic.      Nose: No congestion.      Mouth/Throat:      Mouth: Mucous membranes are moist.      Pharynx: Oropharynx is clear.   Eyes:      Extraocular Movements: Extraocular movements intact.      Conjunctiva/sclera: Conjunctivae normal.      Pupils: Pupils are equal, round, and reactive to light.   Cardiovascular:      Rate and Rhythm: Tachycardia present. Rhythm irregular.   Pulmonary:      Effort: No respiratory distress.      Breath sounds: Rhonchi present. No wheezing.   Abdominal:      General: Abdomen is flat. Bowel sounds are normal.   Musculoskeletal:         General: No swelling.   Skin:     General: Skin is warm and dry.      Capillary Refill: Capillary refill takes less than 2 seconds.   Neurological:      General: No focal deficit present.      Mental Status: He is alert.           Review of Systems    Vents:  Oxygen Concentration (%): 50 (01/16/24 0408)    Lines/Drains/Airways       Drain  Duration             Male External Urinary Catheter 01/16/24 0330 <1 day               Peripheral Intravenous Line  Duration                  Peripheral IV - Single Lumen 01/15/24 18 G Distal;Left;Posterior Forearm 1 day         Peripheral IV - Single Lumen 01/15/24 1519 20 G Right Antecubital <1 day         Peripheral IV - Single Lumen 01/15/24 1706 20 G Posterior;Right Wrist <1 day                    Significant Labs:    CBC/Anemia Profile:  Recent Labs   Lab 01/15/24  1355 01/16/24  0739   WBC 13.80* 12.99*   HGB 15.9 14.6   HCT 48.8 44.3    210   MCV 91 90   RDW 12.2 12.3        Chemistries:  Recent Labs   Lab 01/15/24  1355 01/16/24  0739    136   K 4.6 3.8    99   CO2 22* 26   BUN 13 14   CREATININE 1.0 1.0   CALCIUM 10.0 9.0   ALBUMIN 4.2 3.4*   PROT 8.2 6.9   BILITOT 1.9* 1.0   ALKPHOS 73 56   ALT 24 19   * 44*   MG 1.8 1.7   PHOS  --  2.8       All pertinent labs within the past 24 hours have been reviewed.    Significant Imaging:  I have reviewed all pertinent imaging results/findings within the past 24 hours.

## 2024-01-16 NOTE — PROGRESS NOTES
[facility] [place] [facility]  Critical Care Medicine  Progress Note    Patient Name: Kamar Doshi  MRN: [id number]  Admission Date: 1/15/2024  Hospital Length of Stay: 1 days  Code Status: Full Code  Attending Provider: Freddy Kern III, MD  Primary Care Provider: Bethel Harley MD   Principal Problem: NSTEMI (non-ST elevated myocardial infarction)    Subjective:     HPI:  Mr. Doshi is a 72 yo with severe HFrEF (EF 10% Jan. 1st), atrial fibrillation, uncontrolled DM, previous CVAs with residual deficits that presented with AMS and was found to have uncontrolled atrial fibrillation, troponin of 44, decompensated HF with pulmonary edema. This history is obtained from our ER staff and chart review. He is intermittently able to answer questions but not reliably.   He arrived very disheveled and dirty and his wife reported that they hadn't gotten any of his medications since he left last time.     Hospital/ICU Course:  No notes on file    Interval History: Mr. Doshi is feeling better today and is able to answer questions. He denies any shortness of breath or chest pain currently.      Objective:     Vital Signs (Most Recent):  Temp: 98.4 °F (36.9 °C) (01/16/24 1115)  Pulse: 87 (01/16/24 1145)  Resp: (!) 25 (01/16/24 1145)  BP: 110/69 (01/16/24 1145)  SpO2: 96 % (01/16/24 1145) Vital Signs (24h Range):  Temp:  [97 °F (36.1 °C)-98.7 °F (37.1 °C)] 98.4 °F (36.9 °C)  Pulse:  [] 87  Resp:  [8-40] 25  SpO2:  [81 %-100 %] 96 %  BP: ()/() 110/69     Weight: 82.1 kg (181 lb)  Body mass index is 24.55 kg/m².      Intake/Output Summary (Last 24 hours) at 1/16/2024 1504  Last data filed at 1/16/2024 1400  Gross per 24 hour   Intake 2073.27 ml   Output 2950 ml   Net -876.73 ml        Physical Exam  Vitals reviewed.   Constitutional:       General: He is not in acute distress.     Appearance: Normal appearance.   HENT:      Head: Normocephalic and atraumatic.      Nose: No congestion.       Mouth/Throat:      Mouth: Mucous membranes are moist.      Pharynx: Oropharynx is clear.   Eyes:      Extraocular Movements: Extraocular movements intact.      Conjunctiva/sclera: Conjunctivae normal.      Pupils: Pupils are equal, round, and reactive to light.   Cardiovascular:      Rate and Rhythm: Tachycardia present. Rhythm irregular.   Pulmonary:      Effort: No respiratory distress.      Breath sounds: Rhonchi present. No wheezing.   Abdominal:      General: Abdomen is flat. Bowel sounds are normal.   Musculoskeletal:         General: No swelling.   Skin:     General: Skin is warm and dry.      Capillary Refill: Capillary refill takes less than 2 seconds.   Neurological:      General: No focal deficit present.      Mental Status: He is alert.           Review of Systems    Vents:  Oxygen Concentration (%): 50 (01/16/24 0408)    Lines/Drains/Airways       Drain  Duration             Male External Urinary Catheter 01/16/24 0330 <1 day              Peripheral Intravenous Line  Duration                  Peripheral IV - Single Lumen 01/15/24 18 G Distal;Left;Posterior Forearm 1 day         Peripheral IV - Single Lumen 01/15/24 1519 20 G Right Antecubital <1 day         Peripheral IV - Single Lumen 01/15/24 1706 20 G Posterior;Right Wrist <1 day                    Significant Labs:    CBC/Anemia Profile:  Recent Labs   Lab 01/15/24  1355 01/16/24  0739   WBC 13.80* 12.99*   HGB 15.9 14.6   HCT 48.8 44.3    210   MCV 91 90   RDW 12.2 12.3        Chemistries:  Recent Labs   Lab 01/15/24  1355 01/16/24  0739    136   K 4.6 3.8    99   CO2 22* 26   BUN 13 14   CREATININE 1.0 1.0   CALCIUM 10.0 9.0   ALBUMIN 4.2 3.4*   PROT 8.2 6.9   BILITOT 1.9* 1.0   ALKPHOS 73 56   ALT 24 19   * 44*   MG 1.8 1.7   PHOS  --  2.8       All pertinent labs within the past 24 hours have been reviewed.    Significant Imaging:  I have reviewed all pertinent imaging results/findings within the past 24  hours.    ABG  Recent Labs   Lab 01/15/24  1626   PH 7.361   PO2 327*   PCO2 34.0*   HCO3 19.2*   BE -5*     Assessment/Plan:     Pulmonary  Acute hypoxemic respiratory failure  No previously diagnosed pulmonary problems but does have some history of smoking  - all his current issues are likely related to his acutely decompensated heart failure and new NSTEMI  - improved today from yesterday, he would benefit from nightly NIV for his HF.   - can continue breathing treatments if he feels they help but no wheezing on my exams.     Cardiac/Vascular  Acute on chronic combined systolic and diastolic heart failure  Severely reduced EF, now with troponin of 44 and elevated lactic, agree with cardiology consult  - continue diuresis, and rate control, BP control     Chronic atrial fibrillation with RVR  Cardiology following, improved today   - recs per them.    Endocrine  Type 2 diabetes mellitus with diabetic cataract, without long-term current use of insulin  Uncontrolled DM is and will complicate his care in all aspects.         Critical Care Time: 35 minutes  Critical secondary to Patient has a condition that poses threat to life and bodily function: Acute Myocardial Infarction and Severe Respiratory Distress      Critical care was time spent personally by me on the following activities: development of treatment plan with patient or surrogate and bedside caregivers, discussions with consultants, evaluation of patient's response to treatment, examination of patient, ordering and performing treatments and interventions, ordering and review of laboratory studies, ordering and review of radiographic studies, pulse oximetry, re-evaluation of patient's condition. This critical care time did not overlap with that of any other provider or involve time for any procedures.    Critical care will sign off.      Johanna Shook MD  Critical Care Medicine  [facility] - Intensive Care

## 2024-01-16 NOTE — SUBJECTIVE & OBJECTIVE
Interval History:  Chest pain-free.  In AFib.  Last night was put on epinephrine because of hypotension, it was titrated off early today.    Past Medical History:   Diagnosis Date    Atrial fibrillation     CHF (congestive heart failure)     Clotting disorder     Diabetes mellitus     Hyperlipidemia     Hypertension     Stroke     Vision loss 2019       Past Surgical History:   Procedure Laterality Date    BRAIN SURGERY  2018       Review of patient's allergies indicates:  No Known Allergies    No current facility-administered medications on file prior to encounter.     Current Outpatient Medications on File Prior to Encounter   Medication Sig    apixaban (ELIQUIS) 5 mg Tab Take 1 tablet (5 mg total) by mouth 2 (two) times daily.    atorvastatin (LIPITOR) 80 MG tablet Take 1 tablet (80 mg total) by mouth once daily. Take 1 tab by mouth every night    brimonidine-timoloL (COMBIGAN) 0.2-0.5 % Drop Place 1 drop into the left eye 2 (two) times a day.    insulin (LANTUS SOLOSTAR U-100 INSULIN) glargine 100 units/mL (3mL) SubQ pen Inject 10 Units into the skin every evening.    latanoprost 0.005 % ophthalmic solution Place 1 drop into both eyes nightly.    metFORMIN (GLUCOPHAGE) 500 MG tablet Take 1 tablet (500 mg total) by mouth daily with breakfast.    metoprolol succinate (TOPROL-XL) 50 MG 24 hr tablet Take 1 tablet (50 mg total) by mouth once daily.    pantoprazole (PROTONIX) 40 MG tablet TAKE 1 TABLET BY MOUTH ONCE DAILY IN THE MORNING BEFORE BREAKFAST     Family History       Problem Relation (Age of Onset)    Alcohol abuse Brother    Arthritis Mother, Father    COPD Brother    Heart disease Father    No Known Problems Sister, Sister    Peripheral vascular disease Sister          Tobacco Use    Smoking status: Former     Current packs/day: 0.00     Average packs/day: 1 pack/day for 56.0 years (56.0 ttl pk-yrs)     Types: Cigarettes     Start date:      Quit date:      Years since quittin.0     Smokeless tobacco: Never   Substance and Sexual Activity    Alcohol use: No    Drug use: No    Sexual activity: Never     Review of Systems   Unable to perform ROS: Mental status change   Cardiovascular:  Negative for chest pain.     Objective:     Vital Signs (Most Recent):  Temp: 98.3 °F (36.8 °C) (01/16/24 1510)  Pulse: 101 (01/16/24 1515)  Resp: (!) 27 (01/16/24 1515)  BP: (!) 103/57 (01/16/24 1515)  SpO2: 99 % (01/16/24 1515) Vital Signs (24h Range):  Temp:  [97 °F (36.1 °C)-98.7 °F (37.1 °C)] 98.3 °F (36.8 °C)  Pulse:  [] 101  Resp:  [8-40] 27  SpO2:  [81 %-100 %] 99 %  BP: ()/(46-99) 103/57     Weight: 82.1 kg (181 lb)  Body mass index is 24.55 kg/m².    SpO2: 99 %         Intake/Output Summary (Last 24 hours) at 1/16/2024 1555  Last data filed at 1/16/2024 1501  Gross per 24 hour   Intake 2101.31 ml   Output 3100 ml   Net -998.69 ml         Lines/Drains/Airways       Drain  Duration             Male External Urinary Catheter 01/16/24 0330 <1 day              Peripheral Intravenous Line  Duration                  Peripheral IV - Single Lumen 01/15/24 1519 20 G Right Antecubital 1 day         Peripheral IV - Single Lumen 01/15/24 18 G Distal;Left;Posterior Forearm 1 day         Peripheral IV - Single Lumen 01/15/24 1706 20 G Posterior;Right Wrist <1 day                     Physical Exam  Vitals reviewed.   Constitutional:       Appearance: He is well-developed. He is ill-appearing and toxic-appearing.      Comments: Unkempt appearance.   HENT:      Head: Normocephalic.   Eyes:      Conjunctiva/sclera: Conjunctivae normal.   Cardiovascular:      Rate and Rhythm: Tachycardia present. Rhythm irregular.      Heart sounds: Normal heart sounds.   Pulmonary:      Effort: Respiratory distress present.   Abdominal:      General: Bowel sounds are normal.      Palpations: Abdomen is soft.   Musculoskeletal:      Cervical back: Normal range of motion and neck supple.   Skin:     General: Skin is warm.    Neurological:      Mental Status: He is alert. He is disoriented.      Comments: Oriented only to self.          Significant Labs:         DATA:     Laboratory:  CBC:  Recent Labs   Lab 01/01/24  0235 01/15/24  1355 01/16/24  0739   WBC 8.22 13.80 H 12.99 H   Hemoglobin 14.6 15.9 14.6   Hematocrit 44.3 48.8 44.3   Platelets 247 225 210         CHEMISTRIES:  Recent Labs   Lab 03/04/21  0828 12/01/21  0959 12/31/23  1447 01/01/24  0235 01/15/24  1355 01/16/24  0739   Glucose 267 H 394 H   < > 278 H 258 H 199 H   Sodium 139 134 L   < > 139 137 136   Potassium 4.1 5.1   < > 4.0 4.6 3.8   BUN 20 11   < > 10 13 14   Creatinine 1.2 1.1   < > 1.0 1.0 1.0   eGFR if African American >60 >60.0  --   --   --   --    eGFR if non African American >60 >60.0  --   --   --   --    Calcium 9.7 10.1   < > 9.3 10.0 9.0   Magnesium  --   --   --   --  1.8 1.7    < > = values in this interval not displayed.         CARDIAC BIOMARKERS:  Recent Labs   Lab 01/15/24  1640 01/15/24  2014 01/16/24  0032   Troponin I 40.118 H 35.094 H 29.547 H         COAGS:  Recent Labs   Lab 12/31/23  1447 01/15/24  1355   INR 1.1 1.2         LIPIDS/LFTS:  Recent Labs   Lab 12/01/21  0959 12/31/23  1447 01/01/24  0235 01/15/24  1355 01/16/24  0739   Cholesterol 232 H 198  --   --   --    Triglycerides 275 H 319 H  --   --   --    HDL 40 37 L  --   --   --    LDL Cholesterol 137.0 97.2  --   --   --    Non-HDL Cholesterol 192 161  --   --   --    AST 20 19 15 100 H 44 H   ALT 23 14 16 24 19         Hemoglobin A1C   Date Value Ref Range Status   01/01/2024 11.9 (H) 4.0 - 5.6 % Final     Comment:     ADA Screening Guidelines:  5.7-6.4%  Consistent with prediabetes  >or=6.5%  Consistent with diabetes    High levels of fetal hemoglobin interfere with the HbA1C  assay. Heterozygous hemoglobin variants (HbS, HgC, etc)do  not significantly interfere with this assay.   However, presence of multiple variants may affect accuracy.     12/01/2021 >14.0 (H) 4.0 - 5.6 %  Final     Comment:     ADA Screening Guidelines:  5.7-6.4%  Consistent with prediabetes  >or=6.5%  Consistent with diabetes    High levels of fetal hemoglobin interfere with the HbA1C  assay. Heterozygous hemoglobin variants (HbS, HgC, etc)do  not significantly interfere with this assay.   However, presence of multiple variants may affect accuracy.     03/20/2019 7.3 (H) 4.0 - 5.6 % Final     Comment:     ADA Screening Guidelines:  5.7-6.4%  Consistent with prediabetes  >or=6.5%  Consistent with diabetes  High levels of fetal hemoglobin interfere with the HbA1C  assay. Heterozygous hemoglobin variants (HbS, HgC, etc)do  not significantly interfere with this assay.   However, presence of multiple variants may affect accuracy.         TSH  Recent Labs   Lab 12/31/23  1447 01/15/24  1640   TSH 11.456 H 8.398 H         The ASCVD Risk score (Melanie HEWITT, et al., 2019) failed to calculate for the following reasons:    The patient has a prior MI or stroke diagnosis       BNP    Lab Results   Component Value Date/Time     (H) 01/15/2024 01:55 PM    BNP 44 03/04/2021 08:28 AM            ECHO    Results for orders placed during the hospital encounter of 12/31/23    Echo    Interpretation Summary    Left Ventricle: The left ventricle is normal in size. Mildly increased wall thickness. There is mild concentric hypertrophy. Severe global hypokinesis present. There is severely reduced systolic function with a visually estimated ejection fraction of 10 -15%. Unable to assess diastolic function due to atrial fibrillation.    Right Ventricle: Mild right ventricular enlargement. Systolic function is severely reduced.    Mitral Valve: There is no stenosis. There is mild regurgitation.    Aorta: Aortic root is mildly dilated measuring 3.80 cm.    Pulmonary Artery: The estimated pulmonary artery systolic pressure is 32 mmHg.    Pt appears to be in AFib throughout exam.      Results for orders placed during the hospital encounter of  01/15/24    Echo    Interpretation Summary    Left Ventricle: The left ventricle is mildly dilated. Normal wall thickness. Regional wall motion abnormalities present. There is severely reduced systolic function with a visually estimated ejection fraction of 10 -15%. There is diastolic dysfunction.anteroseptal wall thinnned out and appears infarcted    Right Ventricle: Normal right ventricular cavity size. Systolic function is normal.    Left Atrium: Left atrium is severely dilated.    Right Atrium: Right atrium is moderately dilated.    Mitral Valve: There is mild regurgitation.    Tricuspid Valve: There is mild regurgitation.    Pulmonary Artery: The estimated pulmonary artery systolic pressure is 31 mmHg.    IVC/SVC: Elevated venous pressure at 15 mmHg.      STRESS TEST    No results found for this or any previous visit.        CATH    No results found for this or any previous visit.

## 2024-01-16 NOTE — PROGRESS NOTES
[facility] [place] Intensive Care  Cardiology  Progress Note    Patient Name: Kamar Doshi  MRN: [id number]  Admission Date: 1/15/2024  Hospital Length of Stay: 1 days  Code Status: Full Code   Attending Physician: Freddy Kern III, MD   Primary Care Physician: Bethel Harley MD  Expected Discharge Date:   Principal Problem:NSTEMI (non-ST elevated myocardial infarction)    Subjective:       Interval History:  Chest pain-free.  In AFib.  Last night was put on epinephrine because of hypotension, it was titrated off early today.    Past Medical History:   Diagnosis Date    Atrial fibrillation     CHF (congestive heart failure)     Clotting disorder     Diabetes mellitus     Hyperlipidemia     Hypertension     Stroke     Vision loss 01/01/2019       Past Surgical History:   Procedure Laterality Date    BRAIN SURGERY  01/01/2018       Review of patient's allergies indicates:  No Known Allergies    No current facility-administered medications on file prior to encounter.     Current Outpatient Medications on File Prior to Encounter   Medication Sig    apixaban (ELIQUIS) 5 mg Tab Take 1 tablet (5 mg total) by mouth 2 (two) times daily.    atorvastatin (LIPITOR) 80 MG tablet Take 1 tablet (80 mg total) by mouth once daily. Take 1 tab by mouth every night    brimonidine-timoloL (COMBIGAN) 0.2-0.5 % Drop Place 1 drop into the left eye 2 (two) times a day.    insulin (LANTUS SOLOSTAR U-100 INSULIN) glargine 100 units/mL (3mL) SubQ pen Inject 10 Units into the skin every evening.    latanoprost 0.005 % ophthalmic solution Place 1 drop into both eyes nightly.    metFORMIN (GLUCOPHAGE) 500 MG tablet Take 1 tablet (500 mg total) by mouth daily with breakfast.    metoprolol succinate (TOPROL-XL) 50 MG 24 hr tablet Take 1 tablet (50 mg total) by mouth once daily.    pantoprazole (PROTONIX) 40 MG tablet TAKE 1 TABLET BY MOUTH ONCE DAILY IN THE MORNING BEFORE BREAKFAST     Family History       Problem Relation (Age of Onset)     Alcohol abuse Brother    Arthritis Mother, Father    COPD Brother    Heart disease Father    No Known Problems Sister, Sister    Peripheral vascular disease Sister          Tobacco Use    Smoking status: Former     Current packs/day: 0.00     Average packs/day: 1 pack/day for 56.0 years (56.0 ttl pk-yrs)     Types: Cigarettes     Start date:      Quit date: 2019     Years since quittin.0    Smokeless tobacco: Never   Substance and Sexual Activity    Alcohol use: No    Drug use: No    Sexual activity: Never     Review of Systems   Unable to perform ROS: Mental status change   Cardiovascular:  Negative for chest pain.     Objective:     Vital Signs (Most Recent):  Temp: 98.3 °F (36.8 °C) (24)  Pulse: 101 (24)  Resp: (!) 27 (24)  BP: (!) 103/57 (24)  SpO2: 99 % (24) Vital Signs (24h Range):  Temp:  [97 °F (36.1 °C)-98.7 °F (37.1 °C)] 98.3 °F (36.8 °C)  Pulse:  [] 101  Resp:  [8-40] 27  SpO2:  [81 %-100 %] 99 %  BP: ()/(46-99) 103/57     Weight: 82.1 kg (181 lb)  Body mass index is 24.55 kg/m².    SpO2: 99 %         Intake/Output Summary (Last 24 hours) at 2024 1555  Last data filed at 2024 1501  Gross per 24 hour   Intake 2101.31 ml   Output 3100 ml   Net -998.69 ml         Lines/Drains/Airways       Drain  Duration             Male External Urinary Catheter 24 0330 <1 day              Peripheral Intravenous Line  Duration                  Peripheral IV - Single Lumen 01/15/24 1519 20 G Right Antecubital 1 day         Peripheral IV - Single Lumen 01/15/24 18 G Distal;Left;Posterior Forearm 1 day         Peripheral IV - Single Lumen 01/15/24 1706 20 G Posterior;Right Wrist <1 day                     Physical Exam  Vitals reviewed.   Constitutional:       Appearance: He is well-developed. He is ill-appearing and toxic-appearing.      Comments: Unkempt appearance.   HENT:      Head: Normocephalic.   Eyes:      Conjunctiva/sclera:  Conjunctivae normal.   Cardiovascular:      Rate and Rhythm: Tachycardia present. Rhythm irregular.      Heart sounds: Normal heart sounds.   Pulmonary:      Effort: Respiratory distress present.   Abdominal:      General: Bowel sounds are normal.      Palpations: Abdomen is soft.   Musculoskeletal:      Cervical back: Normal range of motion and neck supple.   Skin:     General: Skin is warm.   Neurological:      Mental Status: He is alert. He is disoriented.      Comments: Oriented only to self.          Significant Labs:         DATA:     Laboratory:  CBC:  Recent Labs   Lab 01/01/24  0235 01/15/24  1355 01/16/24  0739   WBC 8.22 13.80 H 12.99 H   Hemoglobin 14.6 15.9 14.6   Hematocrit 44.3 48.8 44.3   Platelets 247 225 210         CHEMISTRIES:  Recent Labs   Lab 03/04/21  0828 12/01/21  0959 12/31/23  1447 01/01/24  0235 01/15/24  1355 01/16/24  0739   Glucose 267 H 394 H   < > 278 H 258 H 199 H   Sodium 139 134 L   < > 139 137 136   Potassium 4.1 5.1   < > 4.0 4.6 3.8   BUN 20 11   < > 10 13 14   Creatinine 1.2 1.1   < > 1.0 1.0 1.0   eGFR if African American >60 >60.0  --   --   --   --    eGFR if non African American >60 >60.0  --   --   --   --    Calcium 9.7 10.1   < > 9.3 10.0 9.0   Magnesium  --   --   --   --  1.8 1.7    < > = values in this interval not displayed.         CARDIAC BIOMARKERS:  Recent Labs   Lab 01/15/24  1640 01/15/24  2014 01/16/24  0032   Troponin I 40.118 H 35.094 H 29.547 H         COAGS:  Recent Labs   Lab 12/31/23  1447 01/15/24  1355   INR 1.1 1.2         LIPIDS/LFTS:  Recent Labs   Lab 12/01/21  0959 12/31/23  1447 01/01/24  0235 01/15/24  1355 01/16/24  0739   Cholesterol 232 H 198  --   --   --    Triglycerides 275 H 319 H  --   --   --    HDL 40 37 L  --   --   --    LDL Cholesterol 137.0 97.2  --   --   --    Non-HDL Cholesterol 192 161  --   --   --    AST 20 19 15 100 H 44 H   ALT 23 14 16 24 19         Hemoglobin A1C   Date Value Ref Range Status   01/01/2024 11.9 (H) 4.0 -  5.6 % Final     Comment:     ADA Screening Guidelines:  5.7-6.4%  Consistent with prediabetes  >or=6.5%  Consistent with diabetes    High levels of fetal hemoglobin interfere with the HbA1C  assay. Heterozygous hemoglobin variants (HbS, HgC, etc)do  not significantly interfere with this assay.   However, presence of multiple variants may affect accuracy.     12/01/2021 >14.0 (H) 4.0 - 5.6 % Final     Comment:     ADA Screening Guidelines:  5.7-6.4%  Consistent with prediabetes  >or=6.5%  Consistent with diabetes    High levels of fetal hemoglobin interfere with the HbA1C  assay. Heterozygous hemoglobin variants (HbS, HgC, etc)do  not significantly interfere with this assay.   However, presence of multiple variants may affect accuracy.     03/20/2019 7.3 (H) 4.0 - 5.6 % Final     Comment:     ADA Screening Guidelines:  5.7-6.4%  Consistent with prediabetes  >or=6.5%  Consistent with diabetes  High levels of fetal hemoglobin interfere with the HbA1C  assay. Heterozygous hemoglobin variants (HbS, HgC, etc)do  not significantly interfere with this assay.   However, presence of multiple variants may affect accuracy.         TSH  Recent Labs   Lab 12/31/23  1447 01/15/24  1640   TSH 11.456 H 8.398 H         The ASCVD Risk score (Melanie DK, et al., 2019) failed to calculate for the following reasons:    The patient has a prior MI or stroke diagnosis       BNP    Lab Results   Component Value Date/Time     (H) 01/15/2024 01:55 PM    BNP 44 03/04/2021 08:28 AM            ECHO    Results for orders placed during the hospital encounter of 12/31/23    Echo    Interpretation Summary    Left Ventricle: The left ventricle is normal in size. Mildly increased wall thickness. There is mild concentric hypertrophy. Severe global hypokinesis present. There is severely reduced systolic function with a visually estimated ejection fraction of 10 -15%. Unable to assess diastolic function due to atrial fibrillation.    Right  Ventricle: Mild right ventricular enlargement. Systolic function is severely reduced.    Mitral Valve: There is no stenosis. There is mild regurgitation.    Aorta: Aortic root is mildly dilated measuring 3.80 cm.    Pulmonary Artery: The estimated pulmonary artery systolic pressure is 32 mmHg.    Pt appears to be in AFib throughout exam.      Results for orders placed during the hospital encounter of 01/15/24    Echo    Interpretation Summary    Left Ventricle: The left ventricle is mildly dilated. Normal wall thickness. Regional wall motion abnormalities present. There is severely reduced systolic function with a visually estimated ejection fraction of 10 -15%. There is diastolic dysfunction.anteroseptal wall thinnned out and appears infarcted    Right Ventricle: Normal right ventricular cavity size. Systolic function is normal.    Left Atrium: Left atrium is severely dilated.    Right Atrium: Right atrium is moderately dilated.    Mitral Valve: There is mild regurgitation.    Tricuspid Valve: There is mild regurgitation.    Pulmonary Artery: The estimated pulmonary artery systolic pressure is 31 mmHg.    IVC/SVC: Elevated venous pressure at 15 mmHg.      STRESS TEST    No results found for this or any previous visit.        CATH    No results found for this or any previous visit.     Assessment and Plan:     Brief HPI:     A-fib  AFib with RVR.  Amiodarone drip.  On Toprol-XL.  Will titrate beta blockers as needed and tolerated.    Myocardial infarction  Patient patient with late presentation of MI.  Aneurysmal changes on EKG, troponins initial at 44, downtrending to 35.  Also altered sensorium.  High-risk for intubation per critical care team and will not be able to tolerate any kind of procedures because of his altered sensorium.  Since this is late presentation of MI, and it is not having ongoing chest pain and the troponins are downtrending, the risk of coronary angiogram at the current time far exceeds any  potential benefit.  Continue aspirin Plavix heparin drip.  Recheck 2D echo    1/16:  Continues to be chest pain-free.  Continue medical management    Acute on chronic combined systolic and diastolic heart failure  Last EF of 10%.  In 2018 had EF of 40-45%.  IV diuresis.  Will initiate guideline medical therapy when possible.    Type 2 diabetes mellitus with diabetic cataract, without long-term current use of insulin        History of embolic stroke L MCA; left basal ganglia; hospitalization [facility] 1/2019            VTE Risk Mitigation (From admission, onward)           Ordered     IP VTE HIGH RISK PATIENT  Once         01/15/24 1626     Place sequential compression device  Until discontinued         01/15/24 1626     heparin 25,000 units in dextrose 5% (100 units/ml) IV bolus from bag - ADDITIONAL PRN BOLUS - 60 units/kg  As needed (PRN)        Question:  Heparin Infusion Adjustment (DO NOT MODIFY ANSWER)  Answer:  \\[employer]\epic\Images\Pharmacy\HeparinInfusions\heparin LOW INTENSITY nomogram for [facility] [id number].[email or web address]    01/15/24 1441     heparin 25,000 units in dextrose 5% (100 units/ml) IV bolus from bag - ADDITIONAL PRN BOLUS - 30 units/kg  As needed (PRN)        Question:  Heparin Infusion Adjustment (DO NOT MODIFY ANSWER)  Answer:  \[email or web address]\epic\Images\Pharmacy\HeparinInfusions\heparin LOW INTENSITY nomogram for [facility] [id number].pdf    01/15/24 1441     heparin 25,000 units in dextrose 5% 250 mL (100 units/mL) infusion LOW INTENSITY nomogram - [facility]  Continuous        Question:  Begin at (units/kg/hr)  Answer:  12    01/15/24 1441                    Kamari Sommer MD  Cardiology  [facility] - Intensive Care    Critical Care Time:  35 minutes     Critical care was time spent personally by me on the following activities: development of treatment plan with patient or surrogate and bedside caregivers, discussions with consultants, evaluation of patient's response to treatment, examination of patient, ordering and performing  treatments and interventions, ordering and review of laboratory studies, ordering and review of radiographic studies, pulse oximetry, re-evaluation of patient's condition. This critical care time did not overlap with that of any other provider or involve time for any procedures.

## 2024-01-16 NOTE — ASSESSMENT & PLAN NOTE
Patient patient with late presentation of MI.  Aneurysmal changes on EKG, troponins initial at 44, downtrending to 35.  Also altered sensorium.  High-risk for intubation per critical care team and will not be able to tolerate any kind of procedures because of his altered sensorium.  Since this is late presentation of MI, and it is not having ongoing chest pain and the troponins are downtrending, the risk of coronary angiogram at the current time far exceeds any potential benefit.  Continue aspirin Plavix heparin drip.  Recheck 2D echo

## 2024-01-16 NOTE — NURSING
[facility] Intensive Care  ICU Shift Summary  Date: 1/16/2024      Prehospitalization: Home  Admit Date / LOS : 1/15/2024/ 1 days    Diagnosis: NSTEMI (non-ST elevated myocardial infarction)    Consults:        Active: Cardio and Palliative       Needed: N/A     Code Status: Full Code   Advanced Directive: <no information>    LDA:  Lines/Drains/Airways       Drain  Duration             Male External Urinary Catheter 01/16/24 0330 <1 day              Peripheral Intravenous Line  Duration                  Peripheral IV - Single Lumen 01/15/24 1519 20 G Right Antecubital 1 day         Peripheral IV - Single Lumen 01/15/24 18 G Distal;Left;Posterior Forearm 1 day         Peripheral IV - Single Lumen 01/15/24 1706 20 G Posterior;Right Wrist <1 day                  Central Lines/Site/Justification:Patient Does Not Have Central Line  Urinary Cath/Order/Justification:Patient Does Not Have Urinary Catheter    Vasopressors/Infusions:    amiodarone in dextrose 5% 0.5 mg/min (01/16/24 1500)    heparin (porcine) in D5W 14 Units/kg/hr (01/16/24 1500)    NORepinephrine bitartrate-D5W 0.02 mcg/kg/min (01/16/24 1501)          GOALS: Volume/ Hemodynamic: MAP >                     RASS: N/A    Pain Management: PO       Pain Controlled: yes     Rhythm: A-Fib    Respiratory Device: Nasal Cannula    Oxygen Concentration (%):  [] 50                 Most Recent SBT/ SAT: N/A       MOVE Screen: AN  ICU Liberation: not applicable    VTE Prophylaxis: Pharm and Mechanical  Mobility: Bedrest  Stress Ulcer Prophylaxis: No    Isolation: No active isolations    Dietary:   Current Diet Order   Procedures    Diet Cardiac      Tolerance: yes  Advancement: yes    I & O (24h):    Intake/Output Summary (Last 24 hours) at 1/16/2024 1537  Last data filed at 1/16/2024 1501  Gross per 24 hour   Intake 2101.31 ml   Output 3100 ml   Net -998.69 ml        Restraints: No    Significant Dates:  Post Op Date: N/A  Rescue Date: N/A  Imaging/ Diagnostics:  "Elevated cardiac enzyme.     Noteworthy Labs:  none    COVID Test: (--)  CBC/Anemia Labs: Coags:    Recent Labs   Lab 01/15/24  1355 01/16/24  0739   WBC 13.80* 12.99*   HGB 15.9 14.6   HCT 48.8 44.3    210   MCV 91 90   RDW 12.2 12.3    Recent Labs   Lab 01/15/24  1355 01/15/24  2014 01/16/24  0739 01/16/24  1408   INR 1.2  --   --   --    APTT 27.3   < > 47.3*  47.3* 44.1*    < > = values in this interval not displayed.        Chemistries:   Recent Labs   Lab 01/15/24  1355 01/16/24  0739    136   K 4.6 3.8    99   CO2 22* 26   BUN 13 14   CREATININE 1.0 1.0   CALCIUM 10.0 9.0   PROT 8.2 6.9   BILITOT 1.9* 1.0   ALKPHOS 73 56   ALT 24 19   * 44*   MG 1.8 1.7   PHOS  --  2.8        Cardiac Enzymes: Ejection Fractions:    Recent Labs     01/15/24  2014 01/16/24  0032   TROPONINI 35.094* 29.547*    No results found for: "EF"     POCT Glucose: HbA1c:    Recent Labs   Lab 01/16/24  0414 01/16/24  0852 01/16/24  1146   POCTGLUCOSE 260* 232* 170*    Hemoglobin A1C   Date Value Ref Range Status   01/01/2024 11.9 (H) 4.0 - 5.6 % Final     Comment:     ADA Screening Guidelines:  5.7-6.4%  Consistent with prediabetes  >or=6.5%  Consistent with diabetes    High levels of fetal hemoglobin interfere with the HbA1C  assay. Heterozygous hemoglobin variants (HbS, HgC, etc)do  not significantly interfere with this assay.   However, presence of multiple variants may affect accuracy.             ICU LOS 18h  Level of Care: Critical Care    Chart Check: 12 HR Done  Shift Summary/Plan for the shift: The patient is on amiodarone, heparin and norepinephrine drips. The plan is to monitor his EKG, vitals and PTT.    "

## 2024-01-16 NOTE — CONSULTS
[facility] - Intensive Care  Cardiology  Consult Note    Patient Name: Kamar Doshi  MRN: [id number]  Admission Date: 1/15/2024  Hospital Length of Stay: 0 days  Code Status: Full Code   Attending Provider: Jacqueline Howe MD   Consulting Provider: Kamari Sommer MD  Primary Care Physician: Bethel Harley MD  Principal Problem:NSTEMI (non-ST elevated myocardial infarction)    Patient information was obtained from patient, relative(s), past medical records, and ER records.     Inpatient consult to Cardiology  Consult performed by: Kamari Sommer MD  Consult ordered by: Jacqueline Howe MD        Subjective:     Chief Complaint:  mi, afib     HPI:   Patient is a pleasant 73-year-old man.  Cardiology consulted because of abnormal EKG.  Demonstrates anterior deep Q-waves with aneurysmal appearing changes.  History mostly obtained from the wife.  Patient only oriented to self, disheveled.  Wife brought patient to hospital because he had been not very responsive.  He denied any chest pains.  When he came initial concern by the ER team was sepsis, was given IV bolus which led to shortness of breath.  Subsequently IV diuresis was given.  And put on BiPAP.  Critical care also evaluated the patient and felt that patient would be very high-risk for chuyita intubation arrest and hence management with BiPAP was recommended.  Patient's troponin was 44 which has been downtrending.  This is consistent with his being a late presentation of MI without any chest pain, aneurysmal changes on EKG as well as downtrending troponins    Results for orders placed during the hospital encounter of 12/31/23    Echo    Interpretation Summary    Left Ventricle: The left ventricle is normal in size. Mildly increased wall thickness. There is mild concentric hypertrophy. Severe global hypokinesis present. There is severely reduced systolic function with a visually estimated ejection fraction of 10 -15%. Unable to assess diastolic function due  to atrial fibrillation.    Right Ventricle: Mild right ventricular enlargement. Systolic function is severely reduced.    Mitral Valve: There is no stenosis. There is mild regurgitation.    Aorta: Aortic root is mildly dilated measuring 3.80 cm.    Pulmonary Artery: The estimated pulmonary artery systolic pressure is 32 mmHg.    Pt appears to be in AFib throughout exam.        HPI: Mr Kamar Doshi is a 73 y.o. man with CHF EF 10%, history of CVA, DM who presented with altered mental status. He is currently able to wake up on BiPAP and answers simple questions. He denies pain and denies chest pain. Further history from ED records- per wife, he has aphasia, had been less interactive/more lethargic over past few days. Presented to ED. Soiled on ED admit. Initial concern for sepsis for Afib RVR. Given metoprolol, vanc, zosyn, 500cc IVF bolus. Labs resulted with NSTEMI. Given asa 325, plavix 600, and started heparin gtt. Developed respiratory distress, started BiPAP, and given neb and total 60mg IV lasix. Admitted to ICU for further workup.      Past Medical History:   Diagnosis Date    Atrial fibrillation     CHF (congestive heart failure)     Clotting disorder     Diabetes mellitus     Hyperlipidemia     Hypertension     Stroke     Vision loss 01/01/2019       Past Surgical History:   Procedure Laterality Date    BRAIN SURGERY  01/01/2018       Review of patient's allergies indicates:  No Known Allergies    No current facility-administered medications on file prior to encounter.     Current Outpatient Medications on File Prior to Encounter   Medication Sig    apixaban (ELIQUIS) 5 mg Tab Take 1 tablet (5 mg total) by mouth 2 (two) times daily.    atorvastatin (LIPITOR) 80 MG tablet Take 1 tablet (80 mg total) by mouth once daily. Take 1 tab by mouth every night    brimonidine-timoloL (COMBIGAN) 0.2-0.5 % Drop Place 1 drop into the left eye 2 (two) times a day.    insulin (LANTUS SOLOSTAR U-100 INSULIN) glargine  "100 units/mL (3mL) SubQ pen Inject 10 Units into the skin every evening.    latanoprost 0.005 % ophthalmic solution Place 1 drop into both eyes nightly.    metFORMIN (GLUCOPHAGE) 500 MG tablet Take 1 tablet (500 mg total) by mouth daily with breakfast.    metoprolol succinate (TOPROL-XL) 50 MG 24 hr tablet Take 1 tablet (50 mg total) by mouth once daily.    pantoprazole (PROTONIX) 40 MG tablet TAKE 1 TABLET BY MOUTH ONCE DAILY IN THE MORNING BEFORE BREAKFAST    [DISCONTINUED] blood sugar diagnostic Strp To check BG 1 times daily, to use with insurance preferred meter    [DISCONTINUED] blood-glucose meter kit To check BG 1 times daily, to use with insurance preferred meter    [DISCONTINUED] lancets Misc To check BG 1 times daily, to use with insurance preferred meter    [DISCONTINUED] pen needle, diabetic 32 gauge x 5/32" Ndle Daily insulin administration     Family History       Problem Relation (Age of Onset)    Alcohol abuse Brother    Arthritis Mother, Father    COPD Brother    Heart disease Father    No Known Problems Sister, Sister    Peripheral vascular disease Sister          Tobacco Use    Smoking status: Former     Current packs/day: 0.00     Average packs/day: 1 pack/day for 56.0 years (56.0 ttl pk-yrs)     Types: Cigarettes     Start date:      Quit date: 2019     Years since quittin.0    Smokeless tobacco: Never   Substance and Sexual Activity    Alcohol use: No    Drug use: No    Sexual activity: Never     Review of Systems   Unable to perform ROS: Mental status change   Cardiovascular:  Negative for chest pain.     Objective:     Vital Signs (Most Recent):  Temp: 98.7 °F (37.1 °C) (01/15/24 165)  Pulse: 101 (01/15/24 2017)  Resp: 17 (01/15/24 2021)  BP: 121/84 (01/15/24 2017)  SpO2: 99 % (01/15/24 2017) Vital Signs (24h Range):  Temp:  [98.3 °F (36.8 °C)-98.7 °F (37.1 °C)] 98.7 °F (37.1 °C)  Pulse:  [] 101  Resp:  [12-40] 17  SpO2:  [91 %-100 %] 99 %  BP: ()/() 121/84 "     Weight: 86.2 kg (190 lb)  Body mass index is 25.77 kg/m².    SpO2: 99 %         Intake/Output Summary (Last 24 hours) at 1/15/2024 2125  Last data filed at 1/15/2024 2025  Gross per 24 hour   Intake 1550 ml   Output 900 ml   Net 650 ml       Lines/Drains/Airways       Peripheral Intravenous Line  Duration                  Peripheral IV - Single Lumen 01/15/24 1519 20 G Right Antecubital <1 day         Peripheral IV - Single Lumen 01/15/24 1706 20 G Posterior;Right Wrist <1 day         Peripheral IV - Single Lumen 01/15/24 18 G Distal;Left;Posterior Forearm <1 day                     Physical Exam  Vitals reviewed.   Constitutional:       Appearance: He is well-developed. He is ill-appearing and toxic-appearing.      Comments: Unkempt appearance.   HENT:      Head: Normocephalic.   Eyes:      Conjunctiva/sclera: Conjunctivae normal.   Cardiovascular:      Rate and Rhythm: Tachycardia present. Rhythm irregular.      Heart sounds: Normal heart sounds.   Pulmonary:      Effort: Respiratory distress present.   Abdominal:      General: Bowel sounds are normal.      Palpations: Abdomen is soft.   Musculoskeletal:      Cervical back: Normal range of motion and neck supple.   Skin:     General: Skin is warm.   Neurological:      Mental Status: He is alert. He is disoriented.      Comments: Oriented only to self.          Significant Labs:         DATA:     Laboratory:  CBC:  Recent Labs   Lab 12/31/23  1447 12/31/23  1500 01/01/24  0235 01/15/24  1355   WBC 7.18  --  8.22 13.80 H   Hemoglobin 16.1  --  14.6 15.9   POC Hematocrit  --  55 H  --   --    Hematocrit 49.4  --  44.3 48.8   Platelets 263  --  247 225       CHEMISTRIES:  Recent Labs   Lab 03/04/21  0828 12/01/21  0959 12/31/23  1447 01/01/24  0235 01/15/24  1355   Glucose 267 H 394 H 275 H 278 H 258 H   Sodium 139 134 L 140 139 137   Potassium 4.1 5.1 3.6 4.0 4.6   BUN 20 11 9 10 13   Creatinine 1.2 1.1 1.1 1.0 1.0   eGFR if African American >60 >60.0  --   --    --    eGFR if non African American >60 >60.0  --   --   --    Calcium 9.7 10.1 10.1 9.3 10.0   Magnesium  --   --   --   --  1.8       CARDIAC BIOMARKERS:  Recent Labs   Lab 01/15/24  1355 01/15/24  1640 01/15/24  2014   Troponin I 44.515 H 40.118 H 35.094 H       COAGS:  Recent Labs   Lab 12/31/23  1447 01/15/24  1355   INR 1.1 1.2       LIPIDS/LFTS:  Recent Labs   Lab 12/01/21  0959 12/31/23  1447 01/01/24  0235 01/15/24  1355   Cholesterol 232 H 198  --   --    Triglycerides 275 H 319 H  --   --    HDL 40 37 L  --   --    LDL Cholesterol 137.0 97.2  --   --    Non-HDL Cholesterol 192 161  --   --    AST 20 19 15 100 H   ALT 23 14 16 24       Hemoglobin A1C   Date Value Ref Range Status   01/01/2024 11.9 (H) 4.0 - 5.6 % Final     Comment:     ADA Screening Guidelines:  5.7-6.4%  Consistent with prediabetes  >or=6.5%  Consistent with diabetes    High levels of fetal hemoglobin interfere with the HbA1C  assay. Heterozygous hemoglobin variants (HbS, HgC, etc)do  not significantly interfere with this assay.   However, presence of multiple variants may affect accuracy.     12/01/2021 >14.0 (H) 4.0 - 5.6 % Final     Comment:     ADA Screening Guidelines:  5.7-6.4%  Consistent with prediabetes  >or=6.5%  Consistent with diabetes    High levels of fetal hemoglobin interfere with the HbA1C  assay. Heterozygous hemoglobin variants (HbS, HgC, etc)do  not significantly interfere with this assay.   However, presence of multiple variants may affect accuracy.     03/20/2019 7.3 (H) 4.0 - 5.6 % Final     Comment:     ADA Screening Guidelines:  5.7-6.4%  Consistent with prediabetes  >or=6.5%  Consistent with diabetes  High levels of fetal hemoglobin interfere with the HbA1C  assay. Heterozygous hemoglobin variants (HbS, HgC, etc)do  not significantly interfere with this assay.   However, presence of multiple variants may affect accuracy.         TSH  Recent Labs   Lab 12/31/23  1447 01/15/24  1640   TSH 11.456 H 8.398 H       The  ASCVD Risk score (Melanie HEWITT, et al., 2019) failed to calculate for the following reasons:    The patient has a prior MI or stroke diagnosis       BNP    Lab Results   Component Value Date/Time     (H) 01/15/2024 01:55 PM    BNP 44 03/04/2021 08:28 AM            ECHO    Results for orders placed during the hospital encounter of 12/31/23    Echo    Interpretation Summary    Left Ventricle: The left ventricle is normal in size. Mildly increased wall thickness. There is mild concentric hypertrophy. Severe global hypokinesis present. There is severely reduced systolic function with a visually estimated ejection fraction of 10 -15%. Unable to assess diastolic function due to atrial fibrillation.    Right Ventricle: Mild right ventricular enlargement. Systolic function is severely reduced.    Mitral Valve: There is no stenosis. There is mild regurgitation.    Aorta: Aortic root is mildly dilated measuring 3.80 cm.    Pulmonary Artery: The estimated pulmonary artery systolic pressure is 32 mmHg.    Pt appears to be in AFib throughout exam.      STRESS TEST    No results found for this or any previous visit.        CATH    No results found for this or any previous visit.     Assessment and Plan:     A-fib  AFib with RVR.  Amiodarone drip.  On Toprol-XL.  Will titrate beta blockers as needed and tolerated    Myocardial infarction  Patient patient with late presentation of MI.  Aneurysmal changes on EKG, troponins initial at 44, downtrending to 35.  Also altered sensorium.  High-risk for intubation per critical care team and will not be able to tolerate any kind of procedures because of his altered sensorium.  Since this is late presentation of MI, and it is not having ongoing chest pain and the troponins are downtrending, the risk of coronary angiogram at the current time far exceeds any potential benefit.  Continue aspirin Plavix heparin drip.  Recheck 2D echo    Acute on chronic combined systolic and diastolic heart  failure  Last EF of 10%.  In 2018 had EF of 40-45%.  IV diuresis.  Will initiate guideline medical therapy when possible.    Type 2 diabetes mellitus with diabetic cataract, without long-term current use of insulin        History of embolic stroke L MCA; left basal ganglia; hospitalization [facility] 1/2019            VTE Risk Mitigation (From admission, onward)           Ordered     IP VTE HIGH RISK PATIENT  Once         01/15/24 1626     Place sequential compression device  Until discontinued         01/15/24 1626     heparin 25,000 units in dextrose 5% (100 units/ml) IV bolus from bag - ADDITIONAL PRN BOLUS - 60 units/kg  As needed (PRN)        Question:  Heparin Infusion Adjustment (DO NOT MODIFY ANSWER)  Answer:  \\[email or web address]\epic\Images\Pharmacy\HeparinInfusions\heparin LOW INTENSITY nomogram for [facility] [id number]    01/15/24 1441     heparin 25,000 units in dextrose 5% (100 units/ml) IV bolus from bag - ADDITIONAL PRN BOLUS - 30 units/kg  As needed (PRN)        Question:  Heparin Infusion Adjustment (DO NOT MODIFY ANSWER)  Answer:  \[email or web address]\epic\Images\Pharmacy\HeparinInfusions\heparin LOW INTENSITY nomogram for [facility] [id number]    01/15/24 1441     heparin 25,000 units in dextrose 5% 250 mL (100 units/mL) infusion LOW INTENSITY nomogram - [facility]  Continuous        Question:  Begin at (units/kg/hr)  Answer:  12    01/15/24 1441                    Thank you for your consult. I will follow-up with patient. Please contact us if you have any additional questions.    Kamari Sommer MD  Cardiology   [facility] - [place] - Intensive Care    Critical Care Time:  45 minutes     Critical care was time spent personally by me on the following activities: development of treatment plan with patient or surrogate and bedside caregivers, discussions with consultants, evaluation of patient's response to treatment, examination of patient, ordering and performing treatments and interventions, ordering and review of laboratory studies, ordering and  review of radiographic studies, pulse oximetry, re-evaluation of patient's condition. This critical care time did not overlap with that of any other provider or involve time for any procedures.

## 2024-01-16 NOTE — ASSESSMENT & PLAN NOTE
Admitted with NSTEMI. Chest pain typical.   Recent Labs   Lab 01/16/24  0032   TROPONINI 29.547*       EKG Afib RVR   Started asa, plavix, heparin gtt, statin.   Trend troponins.   TTE pending.   Cardiology consulted.   Risk factors:   Lab Results   Component Value Date    HGBA1C 11.9 (H) 01/01/2024     LDL 97- above goal

## 2024-01-16 NOTE — ASSESSMENT & PLAN NOTE
AFib with RVR.  Amiodarone drip.  On Toprol-XL.  Will titrate beta blockers as needed and tolerated.

## 2024-01-16 NOTE — CARE UPDATE
[facility] [place]  Nurses Note -- 4 Eyes      1/16/2024       Skin assessed on: Admit      [] No Pressure Injuries Present    []Prevention Measures Documented    [] Yes LDA  for Pressure Injury Previously documented     [x] Yes New Pressure Injury Discovered   [x] LDA for New Pressure Injury Added      Attending RN:  Alma Barber RN     Second RN:  CLEMENTE

## 2024-01-17 PROBLEM — Z71.89 GOALS OF CARE, COUNSELING/DISCUSSION: Status: ACTIVE | Noted: 2024-01-17

## 2024-01-17 LAB
ALBUMIN SERPL BCP-MCNC: 3.2 G/DL (ref 3.5–5.2)
ALP SERPL-CCNC: 54 U/L (ref 55–135)
ALT SERPL W/O P-5'-P-CCNC: 17 U/L (ref 10–44)
ANION GAP SERPL CALC-SCNC: 12 MMOL/L (ref 8–16)
APTT PPP: 45 SEC (ref 21–32)
AST SERPL-CCNC: 30 U/L (ref 10–40)
BASOPHILS # BLD AUTO: 0.06 K/UL (ref 0–0.2)
BASOPHILS NFR BLD: 0.6 % (ref 0–1.9)
BILIRUB SERPL-MCNC: 0.6 MG/DL (ref 0.1–1)
BUN SERPL-MCNC: 16 MG/DL (ref 8–23)
CALCIUM SERPL-MCNC: 8.7 MG/DL (ref 8.7–10.5)
CHLORIDE SERPL-SCNC: 97 MMOL/L (ref 95–110)
CO2 SERPL-SCNC: 25 MMOL/L (ref 23–29)
CREAT SERPL-MCNC: 1 MG/DL (ref 0.5–1.4)
DIFFERENTIAL METHOD BLD: ABNORMAL
EOSINOPHIL # BLD AUTO: 0.1 K/UL (ref 0–0.5)
EOSINOPHIL NFR BLD: 0.5 % (ref 0–8)
ERYTHROCYTE [DISTWIDTH] IN BLOOD BY AUTOMATED COUNT: 12.2 % (ref 11.5–14.5)
EST. GFR  (NO RACE VARIABLE): >60 ML/MIN/1.73 M^2
GLUCOSE SERPL-MCNC: 248 MG/DL (ref 70–110)
HCT VFR BLD AUTO: 40.3 % (ref 40–54)
HGB BLD-MCNC: 13.5 G/DL (ref 14–18)
IMM GRANULOCYTES # BLD AUTO: 0.03 K/UL (ref 0–0.04)
IMM GRANULOCYTES NFR BLD AUTO: 0.3 % (ref 0–0.5)
LYMPHOCYTES # BLD AUTO: 2.8 K/UL (ref 1–4.8)
LYMPHOCYTES NFR BLD: 27.7 % (ref 18–48)
MAGNESIUM SERPL-MCNC: 1.6 MG/DL (ref 1.6–2.6)
MCH RBC QN AUTO: 30.2 PG (ref 27–31)
MCHC RBC AUTO-ENTMCNC: 33.5 G/DL (ref 32–36)
MCV RBC AUTO: 90 FL (ref 82–98)
MONOCYTES # BLD AUTO: 0.9 K/UL (ref 0.3–1)
MONOCYTES NFR BLD: 8.5 % (ref 4–15)
NEUTROPHILS # BLD AUTO: 6.3 K/UL (ref 1.8–7.7)
NEUTROPHILS NFR BLD: 62.4 % (ref 38–73)
NRBC BLD-RTO: 0 /100 WBC
PLATELET # BLD AUTO: 211 K/UL (ref 150–450)
PMV BLD AUTO: 10.2 FL (ref 9.2–12.9)
POCT GLUCOSE: 137 MG/DL (ref 70–110)
POCT GLUCOSE: 157 MG/DL (ref 70–110)
POCT GLUCOSE: 176 MG/DL (ref 70–110)
POCT GLUCOSE: 194 MG/DL (ref 70–110)
POTASSIUM SERPL-SCNC: 3.3 MMOL/L (ref 3.5–5.1)
PROT SERPL-MCNC: 6.7 G/DL (ref 6–8.4)
RBC # BLD AUTO: 4.47 M/UL (ref 4.6–6.2)
SODIUM SERPL-SCNC: 134 MMOL/L (ref 136–145)
WBC # BLD AUTO: 10.12 K/UL (ref 3.9–12.7)

## 2024-01-17 PROCEDURE — 85730 THROMBOPLASTIN TIME PARTIAL: CPT | Performed by: HOSPITALIST

## 2024-01-17 PROCEDURE — A4216 STERILE WATER/SALINE, 10 ML: HCPCS | Performed by: STUDENT IN AN ORGANIZED HEALTH CARE EDUCATION/TRAINING PROGRAM

## 2024-01-17 PROCEDURE — 80053 COMPREHEN METABOLIC PANEL: CPT | Performed by: HOSPITALIST

## 2024-01-17 PROCEDURE — 20000000 HC ICU ROOM

## 2024-01-17 PROCEDURE — 25000003 PHARM REV CODE 250: Performed by: STUDENT IN AN ORGANIZED HEALTH CARE EDUCATION/TRAINING PROGRAM

## 2024-01-17 PROCEDURE — 63600175 PHARM REV CODE 636 W HCPCS: Performed by: EMERGENCY MEDICINE

## 2024-01-17 PROCEDURE — 25000003 PHARM REV CODE 250: Performed by: INTERNAL MEDICINE

## 2024-01-17 PROCEDURE — 99291 CRITICAL CARE FIRST HOUR: CPT | Mod: ,,, | Performed by: INTERNAL MEDICINE

## 2024-01-17 PROCEDURE — 25000003 PHARM REV CODE 250: Performed by: HOSPITALIST

## 2024-01-17 PROCEDURE — 27000221 HC OXYGEN, UP TO 24 HOURS

## 2024-01-17 PROCEDURE — 99223 1ST HOSP IP/OBS HIGH 75: CPT | Mod: ,,, | Performed by: REGISTERED NURSE

## 2024-01-17 PROCEDURE — 85025 COMPLETE CBC W/AUTO DIFF WBC: CPT | Performed by: HOSPITALIST

## 2024-01-17 PROCEDURE — 63600175 PHARM REV CODE 636 W HCPCS: Performed by: HOSPITALIST

## 2024-01-17 PROCEDURE — 99900035 HC TECH TIME PER 15 MIN (STAT)

## 2024-01-17 PROCEDURE — 99497 ADVNCD CARE PLAN 30 MIN: CPT | Mod: 25,,, | Performed by: REGISTERED NURSE

## 2024-01-17 PROCEDURE — 83735 ASSAY OF MAGNESIUM: CPT | Performed by: HOSPITALIST

## 2024-01-17 RX ORDER — MUPIROCIN 20 MG/G
OINTMENT TOPICAL 2 TIMES DAILY
Status: DISCONTINUED | OUTPATIENT
Start: 2024-01-17 | End: 2024-01-18 | Stop reason: HOSPADM

## 2024-01-17 RX ORDER — POTASSIUM CHLORIDE 20 MEQ/1
40 TABLET, EXTENDED RELEASE ORAL ONCE
Status: COMPLETED | OUTPATIENT
Start: 2024-01-17 | End: 2024-01-17

## 2024-01-17 RX ORDER — TALC
6 POWDER (GRAM) TOPICAL NIGHTLY PRN
Status: DISCONTINUED | OUTPATIENT
Start: 2024-01-17 | End: 2024-01-18 | Stop reason: HOSPADM

## 2024-01-17 RX ADMIN — CEFTRIAXONE 2 G: 2 INJECTION, POWDER, FOR SOLUTION INTRAMUSCULAR; INTRAVENOUS at 08:01

## 2024-01-17 RX ADMIN — METOPROLOL SUCCINATE 50 MG: 50 TABLET, EXTENDED RELEASE ORAL at 08:01

## 2024-01-17 RX ADMIN — AMIODARONE HYDROCHLORIDE 0.5 MG/MIN: 1.8 INJECTION, SOLUTION INTRAVENOUS at 12:01

## 2024-01-17 RX ADMIN — HEPARIN SODIUM 14 UNITS/KG/HR: 10000 INJECTION, SOLUTION INTRAVENOUS at 01:01

## 2024-01-17 RX ADMIN — INSULIN ASPART 2 UNITS: 100 INJECTION, SOLUTION INTRAVENOUS; SUBCUTANEOUS at 07:01

## 2024-01-17 RX ADMIN — Medication 6 MG: at 08:01

## 2024-01-17 RX ADMIN — CLOPIDOGREL BISULFATE 75 MG: 75 TABLET ORAL at 08:01

## 2024-01-17 RX ADMIN — ATORVASTATIN CALCIUM 80 MG: 40 TABLET, FILM COATED ORAL at 08:01

## 2024-01-17 RX ADMIN — POTASSIUM CHLORIDE 40 MEQ: 1500 TABLET, EXTENDED RELEASE ORAL at 09:01

## 2024-01-17 RX ADMIN — DOXYCYCLINE 100 MG: 100 INJECTION, POWDER, LYOPHILIZED, FOR SOLUTION INTRAVENOUS at 04:01

## 2024-01-17 RX ADMIN — INSULIN DETEMIR 10 UNITS: 100 INJECTION, SOLUTION SUBCUTANEOUS at 09:01

## 2024-01-17 RX ADMIN — MUPIROCIN: 20 OINTMENT TOPICAL at 08:01

## 2024-01-17 RX ADMIN — Medication 6 MG: at 01:01

## 2024-01-17 RX ADMIN — Medication 10 ML: at 12:01

## 2024-01-17 RX ADMIN — Medication 10 ML: at 08:01

## 2024-01-17 RX ADMIN — APIXABAN 5 MG: 5 TABLET, FILM COATED ORAL at 08:01

## 2024-01-17 RX ADMIN — INSULIN ASPART 2 UNITS: 100 INJECTION, SOLUTION INTRAVENOUS; SUBCUTANEOUS at 11:01

## 2024-01-17 RX ADMIN — ASPIRIN 81 MG CHEWABLE TABLET 81 MG: 81 TABLET CHEWABLE at 08:01

## 2024-01-17 NOTE — ASSESSMENT & PLAN NOTE
Patient has Permanent atrial fibrillation which is uncontrolled. Patient is currently in atrial fibrillation.    Rate control: resume home metoprolol in AM if he can swallow. IV metoprolol 5mg Q6 ordered for now  Rhythm control: not needed- chronic Afib   Anticoagulation indicated. Anticoagulation done with heparin gtt. Note he has Rx for eliquis at home but does not take it.    [id number] - VASc score:  Congestive Heart Failure or EF < 35% YES  +1   Hypertension YES  +1   Age >= 75 NO   Diabetes Mellitus YES  +1   Stroke/TIA prior history YES  +2   Vascular disease (PAD, MI or Aortic Plaque)? Suspect yes   Age 65 - 74 YES  +1   Female NO   Total 6     Lab Results   Component Value Date    TSH 8.398 (H) 01/15/2024   - Free t4 1.0

## 2024-01-17 NOTE — PLAN OF CARE
Addendum:This patient has been screened for Case Management needs.  Based on (communication with nursing), patient would benefit from home health (SN to educate on medication administration, disease process (DM), and diet management; patient has had multiple falls and is on blood thinners; would benefit from [facility] because he has failed to keep or make doctor appointments;  patient would benefit from OP Case Management-

## 2024-01-17 NOTE — ASSESSMENT & PLAN NOTE
- EF 10%  - pt and wife with very limited understanding of CHF  - pt denies significant symptoms at home but does have steady decline in stamina and abilities   - discussed trajectory of life limiting condition (see GOC/ACP)   - hospice qualifying if/when aligns with GOC

## 2024-01-17 NOTE — EICU
Intervention Initiated From:  Bedside    Rossana intervened regarding:  Other    Nurse Notified:  No    Doctor Notified:  Yes    Comments: Telephone call received from bedside RN requesting confirmation of PICC placement and order if okay to use. Message relayed to Dr. Virk.

## 2024-01-17 NOTE — NURSING
[facility], [facility]  Nurses Note -- 4 Eyes      1/17/2024       Skin assessed on: Q Shift      [x] No Pressure Injuries Present    [x]Prevention Measures Documented    [] Yes LDA  for Pressure Injury Previously documented     [] Yes New Pressure Injury Discovered   [] LDA for New Pressure Injury Added      Attending RN:  Nilam Coburn RN     Second RN:  Nisha Lyn RN

## 2024-01-17 NOTE — NURSING
[facility] Intensive Care  ICU Shift Summary  Date: 1/17/2024      Prehospitalization: Home  Admit Date / LOS : 1/15/2024/ 2 days    Diagnosis: NSTEMI (non-ST elevated myocardial infarction)    Consults:        Active: Cardio and Palliative       Needed: N/A     Code Status: DNR   Advanced Directive: <no information>    LDA:  Lines/Drains/Airways       Peripherally Inserted Central Catheter Line  Duration             PICC Triple Lumen 01/16/24 1848 right basilic <1 day              Drain  Duration             Male External Urinary Catheter 01/16/24 0330 1 day              Peripheral Intravenous Line  Duration                  Peripheral IV - Single Lumen 01/15/24 1519 20 G Right Antecubital 1 day         Peripheral IV - Single Lumen 01/15/24 1706 20 G Posterior;Right Wrist 1 day                  Central Lines/Site/Justification:Patient Does Not Have Central Line  Urinary Cath/Order/Justification:Patient Does Not Have Urinary Catheter    Vasopressors/Infusions:    amiodarone in dextrose 5% 0.5 mg/min (01/17/24 1000)    heparin (porcine) in D5W 14 Units/kg/hr (01/17/24 1000)          GOALS: Volume/ Hemodynamic: MAP > 65                     RASS: N/A    Pain Management: PO       Pain Controlled: yes     Rhythm: A-Fib    Respiratory Device: Nasal Cannula    Oxygen Concentration (%):  [28] 28                 Most Recent SBT/ SAT: NA       MOVE Screen: NA  ICU Liberation: not applicable    VTE Prophylaxis: Pharm and Mechanical  Mobility: Bedrest  Stress Ulcer Prophylaxis: No    Isolation: No active isolations    Dietary:   Current Diet Order   Procedures    Diet Cardiac      Tolerance: yes  Advancement: yes    I & O (24h):    Intake/Output Summary (Last 24 hours) at 1/17/2024 1143  Last data filed at 1/17/2024 1000  Gross per 24 hour   Intake 1413.92 ml   Output 850 ml   Net 563.92 ml        Restraints: No    Significant Dates:  Post Op Date: N/A  Rescue Date: N/A  Imaging/ Diagnostics: N/A    Noteworthy Labs:   "none    COVID Test: (--)  CBC/Anemia Labs: Coags:    Recent Labs   Lab 01/16/24  0739 01/17/24  0449   WBC 12.99* 10.12   HGB 14.6 13.5*   HCT 44.3 40.3    211   MCV 90 90   RDW 12.3 12.2    Recent Labs   Lab 01/15/24  1355 01/15/24  2014 01/16/24  1408 01/17/24  0449   INR 1.2  --   --   --    APTT 27.3   < > 44.1* 45.0*    < > = values in this interval not displayed.        Chemistries:   Recent Labs   Lab 01/16/24  0739 01/17/24  0449    134*   K 3.8 3.3*   CL 99 97   CO2 26 25   BUN 14 16   CREATININE 1.0 1.0   CALCIUM 9.0 8.7   PROT 6.9 6.7   BILITOT 1.0 0.6   ALKPHOS 56 54*   ALT 19 17   AST 44* 30   MG 1.7 1.6   PHOS 2.8  --         Cardiac Enzymes: Ejection Fractions:    Recent Labs     01/15/24  2014 01/16/24  0032   TROPONINI 35.094* 29.547*    No results found for: "EF"     POCT Glucose: HbA1c:    Recent Labs   Lab 01/16/24  2121 01/17/24  0709 01/17/24  1101   POCTGLUCOSE 165* 176* 194*    Hemoglobin A1C   Date Value Ref Range Status   01/01/2024 11.9 (H) 4.0 - 5.6 % Final     Comment:     ADA Screening Guidelines:  5.7-6.4%  Consistent with prediabetes  >or=6.5%  Consistent with diabetes    High levels of fetal hemoglobin interfere with the HbA1C  assay. Heterozygous hemoglobin variants (HbS, HgC, etc)do  not significantly interfere with this assay.   However, presence of multiple variants may affect accuracy.             ICU LOS 1d 14h  Level of Care: Critical Care    Chart Check: 12 HR Done  Shift Summary/Plan for the shift: The plan is to monitor vitals, EKG, O2 SAT and BG. The patient was switched to DNR today.    "

## 2024-01-17 NOTE — ASSESSMENT & PLAN NOTE
"Consult - 1/17/2023  - consult received; interval chart reviewed in detail; discussed pt with MDT during ICU rounds   - met with patient and wife at bedside; introduction to palliative medicine team and role in current care and admission   - learned more about pt outside of current admission; he lives at home with wife and brother in law (who wife also cares for following stroke); they do not have any children but have 15 cats that they care for; pt shares little difficulty with ambulation at home and does not use walker but has a wheelchair for errands that involve a lot of walking (uses power scooter at grocery when available)   - overall both pt and wife seem to have a poor understanding of pt's multiple comorbidities and life-limiting conditions; very limited understanding of CHF especially  - simple overview of concepts of pts life-limiting conditions and trajectory discussed with both pt and wife   - GOC/ACP discussion; GOC for focus on spending time at home/outside of hospital and QOL; pt does not wish to make major life style changes in order to improve health status; understands that this may mean sacrificing some time and not optimizing his conditions   - pt feels that he has a good quality of life; he enjoys his wife taking him on car rides daily and will even sit in car while she run errands to get out of the house as he doesn't like being "stuck at home" all day; he looks forward to their trips to [place] twice a week, although his overall intake has been decreasing (which he admits is due to fatigue with prolonged eating)  - Given EF 10%, initiated code status discussion, which pt denies discussing with providers in the past (pt and wife had also never directly discussed before; in depeth discussion of full code vs DNR and associated interventions, risks, and potential outcomes; pt wishes for DNR; reviewed to confirm understanding of these wishes; pt confirms wishes for DNR and "when its his time" " (discussed with primary and DNR order placed)   - also discussed future healthcare goals as pt's shared life style goals as well as medical qualifications seem to align with hospice; discussed home palliative vs home hospice, philosophy of care of each; transparent recommendation that based off information pt has shared he seems to have GOC that align with hospice; pt and wife agreeable to informational session with a provider that can offer palliative and or hospice services at home based off GOC at this time   - Will need LAPOST completed prior to discharge (if not discharging with hospice and/or not completed with hospice)   - pt denies spiritual/Uatsdin needs, not important aspect of care/life per pt and wife  - emotional support provided   - Allowed time for questions/concerns; all addressed; expressed availability of myself/palliative team for additional questions/concerns   - update primary and CM/SW; informational session coordinate with [facility] by LASHONDA Haider; provided Zulay with [employer] with updates of prior discussion with pt/wife

## 2024-01-17 NOTE — PROCEDURES
Kamar Doshi is a 73 y.o. male patient.    Temp: 98.3 °F (36.8 °C) (01/16/24 1510)  Pulse: 99 (01/16/24 1845)  Resp: (!) 21 (01/16/24 1845)  BP: 109/71 (01/16/24 1830)  SpO2: 99 % (01/16/24 1845)  Weight: 82.1 kg (181 lb) (01/16/24 0601)    PICC  Date/Time: 1/16/2024 6:48 PM  Performed by: Ten Yoon RN  Consent Done: Yes  Time out: Immediately prior to procedure a time out was called to verify the correct patient, procedure, equipment, support staff and site/side marked as required  Indications: med administration and vascular access  Anesthesia: local infiltration  Local anesthetic: lidocaine 1% without epinephrine  Anesthetic Total (mL): 5  Preparation: skin prepped with ChloraPrep  Skin prep agent dried: skin prep agent completely dried prior to procedure  Sterile barriers: all five maximum sterile barriers used - cap, mask, sterile gown, sterile gloves, and large sterile sheet  Hand hygiene: hand hygiene performed prior to central venous catheter insertion  Location details: right basilic  Catheter type: triple lumen  Catheter size: 5 Fr  Catheter Length: 40cm    Ultrasound guidance: yes  Vessel Caliber: medium, compressibility normal  Needle advanced into vessel with real time Ultrasound guidance.  Guidewire confirmed in vessel.  Sterile sheath used.  Number of attempts: 1  Post-procedure: blood return through all ports, chlorhexidine patch and sterile dressing applied            Name ten yoon  1/16/2024

## 2024-01-17 NOTE — PLAN OF CARE
Pt free from fall/injury, remains on bedrest, confused but redirectable and answers most questions appropriately. No s/s of infection noted, afebrile, VSS with the exception of BP. Attempted to wean off of Levophed, after one hour had to restart at 0.02 mcg/kg/min r/t MAP 56. Turns independently. Restless at times but easily redirectable.    Problem: Infection  Goal: Absence of Infection Signs and Symptoms  Outcome: Ongoing, Progressing     Problem: Dysrhythmia (Acute Coronary Syndrome)  Goal: Normalized Cardiac Rhythm  Outcome: Ongoing, Progressing     Problem: Arrhythmia/Dysrhythmia (Cardiac Catheterization)  Goal: Stable Heart Rate and Rhythm  Outcome: Ongoing, Progressing     Problem: Diabetes Comorbidity  Goal: Blood Glucose Level Within Targeted Range  Outcome: Ongoing, Progressing     Problem: Skin Injury Risk Increased  Goal: Skin Health and Integrity  Outcome: Ongoing, Progressing     Problem: Fall Injury Risk  Goal: Absence of Fall and Fall-Related Injury  Outcome: Ongoing, Progressing

## 2024-01-17 NOTE — ASSESSMENT & PLAN NOTE
Patient's FSGs are uncontrolled due to hyperglycemia on current medication regimen.  Last A1c reviewed-   Lab Results   Component Value Date    HGBA1C 11.9 (H) 01/01/2024     Most recent fingerstick glucose reviewed-   Recent Labs   Lab 01/16/24  1146 01/16/24  1548 01/16/24  2121 01/17/24  0709   POCTGLUCOSE 170* 207* 165* 176*       Current correctional scale  Low  Maintain anti-hyperglycemic dose as follows-   Antihyperglycemics (From admission, onward)    Start     Stop Route Frequency Ordered    01/16/24 0202  insulin aspart U-100 pen 0-10 Units         -- SubQ Every 4 hours PRN 01/16/24 0102    01/15/24 2100  insulin detemir U-100 (Levemir) pen 10 Units         -- SubQ Nightly 01/15/24 1626        Hold Oral hypoglycemics while patient is in the hospital.

## 2024-01-17 NOTE — EICU
CXR from  19.19 1/6024 reviewed. Right PICC catheter tip projects over the distal SVC OK to use the picc line.  1.30 am  Melatonin  6m po prn ordered for insomnia.

## 2024-01-17 NOTE — ASSESSMENT & PLAN NOTE
- pt and wife with poor health literacy and understanding of DM management   - drinks sodas regular during the day, elevated A1C on admit   - discussed current life style/diet vs recommended DM care vs focus QOL; pt values continuing his current life style and QOL over optimization of his DM (see GOC/ACP)   - continued management per hospital primary

## 2024-01-17 NOTE — EICU
Intervention Initiated From:  Bedside    Rossana intervened regarding:  Medication    Nurse Notified:  No    Doctor Notified:  Yes    Comments: Telephone call received from bedside RN requesting order for med to help patient to rest and sleep. Message relayed to Dr. Virk.

## 2024-01-17 NOTE — PLAN OF CARE
TOMAS Ordaz spoke with patient and wife at bedside.   She asked  to request a hospice/Palliative care meeting with [facility].  Referral sent via [employer].       01/17/24 4391   Discharge Reassessment   Assessment Type Discharge Planning Reassessment   Did the patient's condition or plan change since previous assessment? No   Discharge Plan discussed with:   (in MDT's and with MARCOS Obregon)   Discharge Plan A Hospice/home;Home with family  (vs Palliative care at home)   Discharge Plan B Home with family;Home Health   DME Needed Upon Discharge    (tbd)   Transition of Care Barriers Does not adhere to care plan;Other (see comments)  (both patient and wife have a limited understanding of patient's condition)   Why the patient remains in the hospital Requires continued medical care   Post-Acute Status   Post-Acute Authorization Other  (hospice vs Palliative care at home)   Other Status See Comments  (Per Ondina's request referral sent to [facility] to discuss hopice vs Palliative care)

## 2024-01-17 NOTE — HPI
"From H&P: " Mr Kamar Doshi is a 73 y.o. man with CHF EF 10%, history of CVA, DM who presented with altered mental status. He is currently able to wake up on BiPAP and answers simple questions. He denies pain and denies chest pain. Further history from ED records- per wife, he has aphasia, had been less interactive/more lethargic over past few days. Presented to ED. Soiled on ED admit. Initial concern for sepsis for Afib RVR. Given metoprolol, vanc, zosyn, 500cc IVF bolus. Labs resulted with NSTEMI. Given asa 325, plavix 600, and started heparin gtt. Developed respiratory distress, started BiPAP, and given neb and total 60mg IV lasix. Admitted to ICU for further workup.  "    Palliative medicine consulted for goals of care discussion and advance care planning; for details of visit, see advance care planning section of plan.   "

## 2024-01-17 NOTE — SUBJECTIVE & OBJECTIVE
Interval History: Pt states he is feeling much better today. Hr better controlled. No cp or sob.    Review of Systems  Objective:     Vital Signs (Most Recent):  Temp: 97.9 °F (36.6 °C) (01/17/24 0705)  Pulse: 105 (01/17/24 1000)  Resp: (!) 23 (01/17/24 1000)  BP: 121/87 (01/17/24 1000)  SpO2: 100 % (01/17/24 1000) Vital Signs (24h Range):  Temp:  [97.5 °F (36.4 °C)-98.4 °F (36.9 °C)] 97.9 °F (36.6 °C)  Pulse:  [] 105  Resp:  [11-36] 23  SpO2:  [84 %-100 %] 100 %  BP: ()/(51-96) 121/87     Weight: 82.1 kg (181 lb)  Body mass index is 25.97 kg/m².    Intake/Output Summary (Last 24 hours) at 1/17/2024 1039  Last data filed at 1/17/2024 0830  Gross per 24 hour   Intake 1287.32 ml   Output 1100 ml   Net 187.32 ml         Physical Exam       Appearance: He is ill-appearing. He is not diaphoretic.   HENT:      Head: Normocephalic and atraumatic.   Cardiovascular:      Rate and Rhythm: regular present. Rhythm irregular.      Comments: Afib  Pulmonary:      Effort: no resp distress or accessory muscle use     Comments: on NC   Abdominal:      General: Bowel sounds are normal. There is no distension.      Palpations: Abdomen is soft. There is no mass.      Tenderness: There is no abdominal tenderness. There is no guarding.   Musculoskeletal:      Right lower leg: Edema present.      Left lower leg: No edema.   Skin:     General: Skin is warm and dry.     Significant Labs: All pertinent labs within the past 24 hours have been reviewed.    Significant Imaging: I have reviewed all pertinent imaging results/findings within the past 24 hours.

## 2024-01-17 NOTE — ASSESSMENT & PLAN NOTE
- cardiology following; intervention risk outweighs benefit at this time per Cardiology, medical management recommended   - noted; continued management per cardiology and primary

## 2024-01-17 NOTE — CONSULTS
[facility] - Intensive Care  Palliative Medicine  Consult Note    Patient Name: Kamar Doshi  MRN: [id number]  Admission Date: 1/15/2024  Hospital Length of Stay: 2 days  Code Status: DNR   Attending Provider: Freddy Kern III, MD  Consulting Provider: Ondina Coleman NP  Primary Care Physician: Bethel Harley MD  Principal Problem:NSTEMI (non-ST elevated myocardial infarction)    Patient information was obtained from patient, spouse/SO, past medical records, ER records, and primary team.      Inpatient consult to Palliative Care  Consult performed by: Ondina Coleman NP  Consult ordered by: Freddy Kern III, MD  Reason for consult: goals of care and advanced care planning        Assessment/Plan:   Advance Care Planning     Palliative Care  Goals of care, counseling/discussion  Consult - 1/17/2023  - consult received; interval chart reviewed in detail; discussed pt with MDT during ICU rounds   - met with patient and wife at bedside; introduction to palliative medicine team and role in current care and admission   - learned more about pt outside of current admission; he lives at home with wife and brother in law (who wife also cares for following stroke); they do not have any children but have 15 cats that they care for; pt shares little difficulty with ambulation at home and does not use walker but has a wheelchair for errands that involve a lot of walking (uses power scooter at grocery when available)   - overall both pt and wife seem to have a poor understanding of pt's multiple comorbidities and life-limiting conditions; very limited understanding of CHF especially  - simple overview of concepts of pts life-limiting conditions and trajectory discussed with both pt and wife   - GOC/ACP discussion; GOC for focus on spending time at home/outside of hospital and QOL; pt does not wish to make major life style changes in order to improve health status; understands that this may mean sacrificing  "some time and not optimizing his conditions   - pt feels that he has a good quality of life; he enjoys his wife taking him on car rides daily and will even sit in car while she run errands to get out of the house as he doesn't like being "stuck at home" all day; he looks forward to their trips to [facility] twice a week, although his overall intake has been decreasing (which he admits is due to fatigue with prolonged eating)  - Given EF 10%, initiated code status discussion, which pt denies discussing with providers in the past (pt and wife had also never directly discussed before; in depeth discussion of full code vs DNR and associated interventions, risks, and potential outcomes; pt wishes for DNR; reviewed to confirm understanding of these wishes; pt confirms wishes for DNR and "when its his time" (discussed with primary and DNR order placed)   - also discussed future healthcare goals as pt's shared life style goals as well as medical qualifications seem to align with hospice; discussed home palliative vs home hospice, philosophy of care of each; transparent recommendation that based off information pt has shared he seems to have GOC that align with hospice; pt and wife agreeable to informational session with a provider that can offer palliative and or hospice services at home based off GOC at this time   - Will need LAPOST completed prior to discharge (if not discharging with hospice and/or not completed with hospice)   - pt denies spiritual/Evangelical needs, not important aspect of care/life per pt and wife  - emotional support provided   - Allowed time for questions/concerns; all addressed; expressed availability of myself/palliative team for additional questions/concerns   - update primary and CM/SW; informational session coordinate with [facility] by LASHONDA Haider; provided Zulay with [facility] with updates of prior discussion with pt/wife     Neuro  History of embolic stroke L MCA; left basal ganglia; " hospitalization [facility] 1/2019  - history noted; pt denies major deficits     Pulmonary  Acute hypoxemic respiratory failure  - improved, on NC; denies home O2 use   - continued management per hospital primary     Cardiac/Vascular  * NSTEMI (non-ST elevated myocardial infarction)  - cardiology following; intervention risk outweighs benefit at this time per Cardiology, medical management recommended   - noted; continued management per cardiology and primary     A-fib  - cardiology following; improved rate now   - denies symptoms when in Afib   - noted; continued management per hospital primary     Acute on chronic combined systolic and diastolic heart failure  - EF 10%  - pt and wife with very limited understanding of CHF  - pt denies significant symptoms at home but does have steady decline in stamina and abilities   - discussed trajectory of life limiting condition (see GOC/ACP)   - hospice qualifying if/when aligns with [facility]     Endocrine  Type 2 diabetes mellitus with diabetic cataract, without long-term current use of insulin  - pt and wife with poor health literacy and understanding of DM management   - drinks sodas regular during the day, elevated A1C on admit   - discussed current life style/diet vs recommended DM care vs focus QOL; pt values continuing his current life style and QOL over optimization of his DM (see GOC/ACP)   - continued management per hospital primary     Thank you for your consult. I will follow-up with patient. Please contact us if you have any additional questions.    Total visit time: 86 minutes    70 min visit time including: face to face time in discussion of symptom assessment, and exploring options and burdens of offered treatments.  This also includes non-face to face time preparing to see the patient including chart review, obtaining and/or reviewing separately obtained history, documenting clinical information in the electronic or other health record, independently interpreting  "results and communicating results to the patient/family/caregiver, family discussions by phone if not able to be present, coordination of care with other specialists, and discharge planning.     16 min ACP time spent: goals of care, advanced care planning, emotional support, formulating and communicating prognosis, exploring burden/ benefit of various approaches of treatment.     Ondina Coleman NP  Palliative Medicine  [facility]    Subjective:     HPI:   From H&P: " Mr Kamar Doshi is a 73 y.o. man with CHF EF 10%, history of CVA, DM who presented with altered mental status. He is currently able to wake up on BiPAP and answers simple questions. He denies pain and denies chest pain. Further history from ED records- per wife, he has aphasia, had been less interactive/more lethargic over past few days. Presented to ED. Soiled on ED admit. Initial concern for sepsis for Afib RVR. Given metoprolol, vanc, zosyn, 500cc IVF bolus. Labs resulted with NSTEMI. Given asa 325, plavix 600, and started heparin gtt. Developed respiratory distress, started BiPAP, and given neb and total 60mg IV lasix. Admitted to ICU for further workup.  "    Palliative medicine consulted for goals of care discussion and advance care planning; for details of visit, see advance care planning section of plan.     Hospital Course:  No notes on file    Past Medical History:   Diagnosis Date    Atrial fibrillation     CHF (congestive heart failure)     Clotting disorder     Diabetes mellitus     Hyperlipidemia     Hypertension     Stroke     Vision loss 01/01/2019       Past Surgical History:   Procedure Laterality Date    BRAIN SURGERY  01/01/2018       Review of patient's allergies indicates:  No Known Allergies    Medications:  Continuous Infusions:   amiodarone in dextrose 5% 0.5 mg/min (01/17/24 1208)    heparin (porcine) in D5W 14 Units/kg/hr (01/17/24 1200)     Scheduled Meds:   aspirin  81 mg Oral Daily    " atorvastatin  80 mg Oral Daily    cefTRIAXone (Rocephin) IV (PEDS and ADULTS)  2 g Intravenous Q24H    clopidogreL  75 mg Oral Daily    doxycycline (VIBRAMYCIN) IVPB  100 mg Intravenous Q12H    insulin detemir U-100  10 Units Subcutaneous QHS    metoprolol succinate  50 mg Oral Daily    sodium chloride 0.9%  10 mL Intravenous Q6H     PRN Meds:acetaminophen, dextrose 10%, dextrose 10%, glucagon (human recombinant), heparin (PORCINE), heparin (PORCINE), insulin aspart U-100, melatonin, nitroGLYCERIN, ondansetron, Flushing PICC/Midline Protocol **AND** sodium chloride 0.9% **AND** sodium chloride 0.9%    Family History       Problem Relation (Age of Onset)    Alcohol abuse Brother    Arthritis Mother, Father    COPD Brother    Heart disease Father    No Known Problems Sister, Sister    Peripheral vascular disease Sister          Tobacco Use    Smoking status: Former     Current packs/day: 0.00     Average packs/day: 1 pack/day for 56.0 years (56.0 ttl pk-yrs)     Types: Cigarettes     Start date:      Quit date:      Years since quittin.0    Smokeless tobacco: Never   Substance and Sexual Activity    Alcohol use: No    Drug use: No    Sexual activity: Never       Review of Systems   Constitutional:  Positive for appetite change and fatigue.   Respiratory:  Negative for cough and shortness of breath.    Cardiovascular:  Negative for chest pain and palpitations.   Gastrointestinal:  Negative for nausea and vomiting.   Musculoskeletal:  Negative for gait problem.   Neurological:  Negative for weakness and headaches.   Psychiatric/Behavioral:  Negative for confusion. The patient is not nervous/anxious.      Objective:     Vital Signs (Most Recent):  Temp: 98.3 °F (36.8 °C) (24 1115)  Pulse: 84 (24 1115)  Resp: (!) 28 (24 1115)  BP: 129/73 (24 1115)  SpO2: 100 % (24 1115) Vital Signs (24h Range):  Temp:  [97.5 °F (36.4 °C)-98.3 °F (36.8 °C)] 98.3 °F (36.8 °C)  Pulse:  []  84  Resp:  [11-36] 28  SpO2:  [84 %-100 %] 100 %  BP: ()/(51-96) 129/73     Weight: 82.1 kg (181 lb)  Body mass index is 25.97 kg/m².       Physical Exam  Vitals and nursing note reviewed.   Constitutional:       General: He is awake.      Appearance: He is ill-appearing.      Interventions: Nasal cannula in place.   HENT:      Head: Normocephalic and atraumatic.   Pulmonary:      Effort: Pulmonary effort is normal. No respiratory distress.   Abdominal:      Hernia: Hernia: chronically.   Neurological:      Mental Status: He is alert and oriented to person, place, and time.   Psychiatric:         Mood and Affect: Mood normal.         Thought Content: Thought content normal.         Judgment: Judgment normal.        Advance Care Planning  Advance Directives:   Living Will: No    LaPOST: No (Needs LAPOST prior to discharge if doesn't enlist in hospice prior to discharge)    Do Not Resuscitate Status: Yes    Medical Power of : No (wife is legal surrogate decision maker and preferred POA)      Decision Making:  Patient answered questions  Goals of Care: The patient endorses that what is most important right now is to focus on spending time at home, avoiding the hospital, remaining as independent as possible, symptom/pain control, and quality of life, even if it means sacrificing a little time    Accordingly, we have decided that the best plan to meet the patient's goals includes continuing with treatment and optimization while inpatient.  Open to further discussion of resources of home hospice follow hospital stay/workup.          Significant Labs: All pertinent labs within the past 24 hours have been reviewed.  CBC:   Recent Labs   Lab 01/17/24  0449   WBC 10.12   HGB 13.5*   HCT 40.3   MCV 90        BMP:  Recent Labs   Lab 01/17/24  0449   *   *   K 3.3*   CL 97   CO2 25   BUN 16   CREATININE 1.0   CALCIUM 8.7   MG 1.6     LFT:  Lab Results   Component Value Date    AST 30 01/17/2024     ALKPHOS 54 (L) 01/17/2024    BILITOT 0.6 01/17/2024     Albumin:   Albumin   Date Value Ref Range Status   01/17/2024 3.2 (L) 3.5 - 5.2 g/dL Final     Protein:   Total Protein   Date Value Ref Range Status   01/17/2024 6.7 6.0 - 8.4 g/dL Final     Lactic acid:   Lab Results   Component Value Date    LACTATE 2.7 (H) 01/16/2024    LACTATE 2.8 (H) 01/15/2024       Significant Imaging: I have reviewed all pertinent imaging results/findings within the past 24 hours.    I spent a total of 86 minutes on the day of the visit. This includes face to face time in discussion of goals of care, symptom assessment, coordination of care and emotional support.  This also includes non-face to face time preparing to see the patient (eg, review of tests/imaging), obtaining and/or reviewing separately obtained history, documenting clinical information in the electronic or other health record, independently interpreting results and communicating results to the patient/family/caregiver, or care coordinator.    Ondina Coleman NP  Palliative Medicine  [facility] - Intensive Care

## 2024-01-17 NOTE — PROGRESS NOTES
[facility] Medicine  Progress Note    Patient Name: Kamar Doshi  MRN: [id number]  Patient Class: IP- Inpatient   Admission Date: 1/15/2024  Length of Stay: 2 days  Attending Physician: Freddy Kern III, MD  Primary Care Provider: Bethel Harley MD        Subjective:     Principal Problem:NSTEMI (non-ST elevated myocardial infarction)        HPI:  Mr Kamar Doshi is a 73 y.o. man with CHF EF 10%, history of CVA, DM who presented with altered mental status. He is currently able to wake up on BiPAP and answers simple questions. He denies pain and denies chest pain. Further history from ED records- per wife, he has aphasia, had been less interactive/more lethargic over past few days. Presented to ED. Soiled on ED admit. Initial concern for sepsis for Afib RVR. Given metoprolol, vanc, zosyn, 500cc IVF bolus. Labs resulted with NSTEMI. Given asa 325, plavix 600, and started heparin gtt. Developed respiratory distress, started BiPAP, and given neb and total 60mg IV lasix. Admitted to ICU for further workup.     Overview/Hospital Course:  No notes on file    Interval History: Pt states he is feeling much better today. Hr better controlled. No cp or sob.    Review of Systems  Objective:     Vital Signs (Most Recent):  Temp: 97.9 °F (36.6 °C) (01/17/24 0705)  Pulse: 105 (01/17/24 1000)  Resp: (!) 23 (01/17/24 1000)  BP: 121/87 (01/17/24 1000)  SpO2: 100 % (01/17/24 1000) Vital Signs (24h Range):  Temp:  [97.5 °F (36.4 °C)-98.4 °F (36.9 °C)] 97.9 °F (36.6 °C)  Pulse:  [] 105  Resp:  [11-36] 23  SpO2:  [84 %-100 %] 100 %  BP: ()/(51-96) 121/87     Weight: 82.1 kg (181 lb)  Body mass index is 25.97 kg/m².    Intake/Output Summary (Last 24 hours) at 1/17/2024 1039  Last data filed at 1/17/2024 0830  Gross per 24 hour   Intake 1287.32 ml   Output 1100 ml   Net 187.32 ml         Physical Exam       Appearance: He is ill-appearing. He is not diaphoretic.   HENT:      Head:  Normocephalic and atraumatic.   Cardiovascular:      Rate and Rhythm: regular present. Rhythm irregular.      Comments: Afib  Pulmonary:      Effort: no resp distress or accessory muscle use     Comments: on NC   Abdominal:      General: Bowel sounds are normal. There is no distension.      Palpations: Abdomen is soft. There is no mass.      Tenderness: There is no abdominal tenderness. There is no guarding.   Musculoskeletal:      Right lower leg: Edema present.      Left lower leg: No edema.   Skin:     General: Skin is warm and dry.     Significant Labs: All pertinent labs within the past 24 hours have been reviewed.    Significant Imaging: I have reviewed all pertinent imaging results/findings within the past 24 hours.    Assessment/Plan:      * NSTEMI (non-ST elevated myocardial infarction)  Admitted with NSTEMI. Chest pain typical.   Recent Labs   Lab 01/16/24  0032   TROPONINI 29.547*       EKG Afib RVR   Started asa, plavix, heparin gtt, statin.   Trend troponins.   TTE pending.   Cardiology consulted.   Risk factors:   Lab Results   Component Value Date    HGBA1C 11.9 (H) 01/01/2024     LDL 97- above goal       A-fib  Cardiology consulted on heparin drip.transition back to eliquis after 48 hours on heparin drip for ACS    Myocardial infarction  Cardiology consulted. Recommending medical management      Acute metabolic encephalopathy  At baseline he has aphasia per notes. This is due to prior strokes. He currently will wake up and answer yes/no questions with nod. CTH no acute changes but does show prior CVA  - monitor mental status        Leukocytosis  WBC elevated. Likely reactive to NSTEMI, but cannot rule out infection  - check COVID  - will cover for CAP with CTX, doxy      Acute hypoxemic respiratory failure  Patient admitted with Hypoxic which is Acute.  he is not on home oxygen. Signs/symptoms of respiratory failure include- tachypnea, increased work of breathing, respiratory distress, and use of  accessory muscles present on admission.   - Labs and images were reviewed. Patient Has not has a recent ABG.   - Supplemental oxygen was provided and noted-  on BiPAP    - Respiratory failure is due to- CHF   - he has EF 10% now with NSTEMI. CXR with pulmonary edema. Suspect resp failure is due to CHF exacerbation. Continue IV lasix 60mg BID  - does have elevated WBC. Flu negative. COVID pending. Afebrile but could have component of CAP. Will cover with CTX/doxycycline for now.   - Pulmonary is consulted       Abdominal aortic aneurysm (AAA) without rupture on CT 3/4/21  Noted. Avoid fluoroquinolones    Acute on chronic combined systolic and diastolic heart failure  Patient admitted with shortness of breath, edema consistent with acute on chronic systolic and diastolic CHF. Cause of exacerbation is NSTEMI. Last TTE EF 10%    BNP  Recent Labs   Lab 01/15/24  1355   *       CXR: pulmonary edema  Recent Labs   Lab 01/16/24  0032   TROPONINI 29.547*       EKG personally reviewed and shows Afib RVR   Started CHF pathway  Start on IV lasix diuresis. Monitor ins and outs  TTE pending   Continue Bb  Cardiology consult         Chronic atrial fibrillation with RVR  Patient has Permanent atrial fibrillation which is uncontrolled. Patient is currently in atrial fibrillation.    Rate control: resume home metoprolol in AM if he can swallow. IV metoprolol 5mg Q6 ordered for now  Rhythm control: not needed- chronic Afib   Anticoagulation indicated. Anticoagulation done with heparin gtt. Note he has Rx for eliquis at home but does not take it.    [id number] - VASc score:  Congestive Heart Failure or EF < 35% YES  +1   Hypertension YES  +1   Age >= 75 NO   Diabetes Mellitus YES  +1   Stroke/TIA prior history YES  +2   Vascular disease (PAD, MI or Aortic Plaque)? Suspect yes   Age 65 - 74 YES  +1   Female NO   Total 6     Lab Results   Component Value Date    TSH 8.398 (H) 01/15/2024   - Free t4 1.0      Type 2 diabetes mellitus  with diabetic cataract, without long-term current use of insulin  Patient's FSGs are uncontrolled due to hyperglycemia on current medication regimen.  Last A1c reviewed-   Lab Results   Component Value Date    HGBA1C 11.9 (H) 01/01/2024     Most recent fingerstick glucose reviewed-   Recent Labs   Lab 01/16/24  1146 01/16/24  1548 01/16/24  2121 01/17/24  0709   POCTGLUCOSE 170* 207* 165* 176*       Current correctional scale  Low  Maintain anti-hyperglycemic dose as follows-   Antihyperglycemics (From admission, onward)      Start     Stop Route Frequency Ordered    01/16/24 0202  insulin aspart U-100 pen 0-10 Units         -- SubQ Every 4 hours PRN 01/16/24 0102    01/15/24 2100  insulin detemir U-100 (Levemir) pen 10 Units         -- SubQ Nightly 01/15/24 1626          Hold Oral hypoglycemics while patient is in the hospital.    History of embolic stroke L MCA; left basal ganglia; hospitalization [facility] 1/2019  Noted. Repeat CTH no acute changes. Has aphasia at baseline.   - continue statin, BP control  - heparin gtt for now given NSTEMI. Eliquis when NSTEMI resolved.         VTE Risk Mitigation (From admission, onward)           Ordered     IP VTE HIGH RISK PATIENT  Once         01/15/24 1626     Place sequential compression device  Until discontinued         01/15/24 1626     heparin 25,000 units in dextrose 5% (100 units/ml) IV bolus from bag - ADDITIONAL PRN BOLUS - 60 units/kg  As needed (PRN)        Question:  Heparin Infusion Adjustment (DO NOT MODIFY ANSWER)  Answer:  \\[email or web address]\epic\Images\Pharmacy\HeparinInfusions\heparin LOW INTENSITY nomogram for [facility] [id number]    01/15/24 1441     heparin 25,000 units in dextrose 5% (100 units/ml) IV bolus from bag - ADDITIONAL PRN BOLUS - 30 units/kg  As needed (PRN)        Question:  Heparin Infusion Adjustment (DO NOT MODIFY ANSWER)  Answer:  \\[email or web address]\epic\Images\Pharmacy\HeparinInfusions\heparin LOW INTENSITY nomogram for [facility] [id number].pdf    01/15/24 1441      heparin 25,000 units in dextrose 5% 250 mL (100 units/mL) infusion LOW INTENSITY nomogram - [facility]  Continuous        Question:  Begin at (units/kg/hr)  Answer:  12    01/15/24 1441                    Discharge Planning   NICHOLAS: 1/19/2024     Code Status: DNR   Is the patient medically ready for discharge?:     Reason for patient still in hospital (select all that apply): Laboratory test, Treatment, and Consult recommendations  Discharge Plan A: Home with family            Critical care time spent on the evaluation and treatment of severe organ dysfunction, review of pertinent labs and imaging studies, discussions with consulting providers and discussions with patient/family: 40 minutes.      Freddy Kern III, MD  Department of Hospital Medicine   [facility] - Intensive Care

## 2024-01-17 NOTE — ASSESSMENT & PLAN NOTE
- cardiology following; improved rate now   - denies symptoms when in Afib   - noted; continued management per hospital primary

## 2024-01-17 NOTE — SUBJECTIVE & OBJECTIVE
Past Medical History:   Diagnosis Date    Atrial fibrillation     CHF (congestive heart failure)     Clotting disorder     Diabetes mellitus     Hyperlipidemia     Hypertension     Stroke     Vision loss 2019       Past Surgical History:   Procedure Laterality Date    BRAIN SURGERY  2018       Review of patient's allergies indicates:  No Known Allergies    Medications:  Continuous Infusions:   amiodarone in dextrose 5% 0.5 mg/min (24 1208)    heparin (porcine) in D5W 14 Units/kg/hr (24 1200)     Scheduled Meds:   aspirin  81 mg Oral Daily    atorvastatin  80 mg Oral Daily    cefTRIAXone (Rocephin) IV (PEDS and ADULTS)  2 g Intravenous Q24H    clopidogreL  75 mg Oral Daily    doxycycline (VIBRAMYCIN) IVPB  100 mg Intravenous Q12H    insulin detemir U-100  10 Units Subcutaneous QHS    metoprolol succinate  50 mg Oral Daily    sodium chloride 0.9%  10 mL Intravenous Q6H     PRN Meds:acetaminophen, dextrose 10%, dextrose 10%, glucagon (human recombinant), heparin (PORCINE), heparin (PORCINE), insulin aspart U-100, melatonin, nitroGLYCERIN, ondansetron, Flushing PICC/Midline Protocol **AND** sodium chloride 0.9% **AND** sodium chloride 0.9%    Family History       Problem Relation (Age of Onset)    Alcohol abuse Brother    Arthritis Mother, Father    COPD Brother    Heart disease Father    No Known Problems Sister, Sister    Peripheral vascular disease Sister          Tobacco Use    Smoking status: Former     Current packs/day: 0.00     Average packs/day: 1 pack/day for 56.0 years (56.0 ttl pk-yrs)     Types: Cigarettes     Start date:      Quit date: 2019     Years since quittin.0    Smokeless tobacco: Never   Substance and Sexual Activity    Alcohol use: No    Drug use: No    Sexual activity: Never       Review of Systems   Constitutional:  Positive for appetite change and fatigue.   Respiratory:  Negative for cough and shortness of breath.    Cardiovascular:  Negative for chest pain and  palpitations.   Gastrointestinal:  Negative for nausea and vomiting.   Musculoskeletal:  Negative for gait problem.   Neurological:  Negative for weakness and headaches.   Psychiatric/Behavioral:  Negative for confusion. The patient is not nervous/anxious.      Objective:     Vital Signs (Most Recent):  Temp: 98.3 °F (36.8 °C) (01/17/24 1115)  Pulse: 84 (01/17/24 1115)  Resp: (!) 28 (01/17/24 1115)  BP: 129/73 (01/17/24 1115)  SpO2: 100 % (01/17/24 1115) Vital Signs (24h Range):  Temp:  [97.5 °F (36.4 °C)-98.3 °F (36.8 °C)] 98.3 °F (36.8 °C)  Pulse:  [] 84  Resp:  [11-36] 28  SpO2:  [84 %-100 %] 100 %  BP: ()/(51-96) 129/73     Weight: 82.1 kg (181 lb)  Body mass index is 25.97 kg/m².       Physical Exam  Vitals and nursing note reviewed.   Constitutional:       General: He is awake.      Appearance: He is ill-appearing.      Interventions: Nasal cannula in place.   HENT:      Head: Normocephalic and atraumatic.   Pulmonary:      Effort: Pulmonary effort is normal. No respiratory distress.   Abdominal:      Hernia: Hernia: chronically.   Neurological:      Mental Status: He is alert and oriented to person, place, and time.   Psychiatric:         Mood and Affect: Mood normal.         Thought Content: Thought content normal.         Judgment: Judgment normal.        Advance Care Planning   Advance Directives:   Living Will: No    [place]: No (Needs LAPOST prior to discharge if doesn't enlist in hospice prior to discharge)    Do Not Resuscitate Status: Yes    Medical Power of : No (wife is legal surrogate decision maker and preferred POA)      Decision Making:  Patient answered questions  Goals of Care: The patient endorses that what is most important right now is to focus on spending time at home, avoiding the hospital, remaining as independent as possible, symptom/pain control, and quality of life, even if it means sacrificing a little time    Accordingly, we have decided that the best plan to meet  the patient's goals includes continuing with treatment and optimization while inpatient.  Open to further discussion of resources of home hospice follow hospital stay/workup.          Significant Labs: All pertinent labs within the past 24 hours have been reviewed.  CBC:   Recent Labs   Lab 01/17/24 0449   WBC 10.12   HGB 13.5*   HCT 40.3   MCV 90        BMP:  Recent Labs   Lab 01/17/24 0449   *   *   K 3.3*   CL 97   CO2 25   BUN 16   CREATININE 1.0   CALCIUM 8.7   MG 1.6     LFT:  Lab Results   Component Value Date    AST 30 01/17/2024    ALKPHOS 54 (L) 01/17/2024    BILITOT 0.6 01/17/2024     Albumin:   Albumin   Date Value Ref Range Status   01/17/2024 3.2 (L) 3.5 - 5.2 g/dL Final     Protein:   Total Protein   Date Value Ref Range Status   01/17/2024 6.7 6.0 - 8.4 g/dL Final     Lactic acid:   Lab Results   Component Value Date    LACTATE 2.7 (H) 01/16/2024    LACTATE 2.8 (H) 01/15/2024       Significant Imaging: I have reviewed all pertinent imaging results/findings within the past 24 hours.

## 2024-01-17 NOTE — NURSING
Pt restless in bed, tossing and turning, getting tangled in equipment, BP not taking because of movement, pulling bipap off on accident, ect. Notified eICU and requesting something for sleep

## 2024-01-18 VITALS
HEART RATE: 88 BPM | DIASTOLIC BLOOD PRESSURE: 74 MMHG | HEIGHT: 70 IN | TEMPERATURE: 98 F | RESPIRATION RATE: 20 BRPM | SYSTOLIC BLOOD PRESSURE: 116 MMHG | WEIGHT: 181 LBS | OXYGEN SATURATION: 95 % | BODY MASS INDEX: 25.91 KG/M2

## 2024-01-18 LAB
ALBUMIN SERPL BCP-MCNC: 3.4 G/DL (ref 3.5–5.2)
ALP SERPL-CCNC: 57 U/L (ref 55–135)
ALT SERPL W/O P-5'-P-CCNC: 15 U/L (ref 10–44)
ANION GAP SERPL CALC-SCNC: 10 MMOL/L (ref 8–16)
AST SERPL-CCNC: 24 U/L (ref 10–40)
BASOPHILS # BLD AUTO: 0.06 K/UL (ref 0–0.2)
BASOPHILS NFR BLD: 0.7 % (ref 0–1.9)
BILIRUB SERPL-MCNC: 0.6 MG/DL (ref 0.1–1)
BUN SERPL-MCNC: 20 MG/DL (ref 8–23)
CALCIUM SERPL-MCNC: 9.3 MG/DL (ref 8.7–10.5)
CHLORIDE SERPL-SCNC: 99 MMOL/L (ref 95–110)
CO2 SERPL-SCNC: 27 MMOL/L (ref 23–29)
CREAT SERPL-MCNC: 1.1 MG/DL (ref 0.5–1.4)
DIFFERENTIAL METHOD BLD: ABNORMAL
EOSINOPHIL # BLD AUTO: 0.1 K/UL (ref 0–0.5)
EOSINOPHIL NFR BLD: 1 % (ref 0–8)
ERYTHROCYTE [DISTWIDTH] IN BLOOD BY AUTOMATED COUNT: 12.2 % (ref 11.5–14.5)
EST. GFR  (NO RACE VARIABLE): >60 ML/MIN/1.73 M^2
GLUCOSE SERPL-MCNC: 163 MG/DL (ref 70–110)
HCT VFR BLD AUTO: 40.5 % (ref 40–54)
HGB BLD-MCNC: 13.4 G/DL (ref 14–18)
IMM GRANULOCYTES # BLD AUTO: 0.02 K/UL (ref 0–0.04)
IMM GRANULOCYTES NFR BLD AUTO: 0.2 % (ref 0–0.5)
LYMPHOCYTES # BLD AUTO: 2.1 K/UL (ref 1–4.8)
LYMPHOCYTES NFR BLD: 25.7 % (ref 18–48)
MAGNESIUM SERPL-MCNC: 1.6 MG/DL (ref 1.6–2.6)
MCH RBC QN AUTO: 29.6 PG (ref 27–31)
MCHC RBC AUTO-ENTMCNC: 33.1 G/DL (ref 32–36)
MCV RBC AUTO: 89 FL (ref 82–98)
MONOCYTES # BLD AUTO: 0.9 K/UL (ref 0.3–1)
MONOCYTES NFR BLD: 11.6 % (ref 4–15)
NEUTROPHILS # BLD AUTO: 4.9 K/UL (ref 1.8–7.7)
NEUTROPHILS NFR BLD: 60.8 % (ref 38–73)
NRBC BLD-RTO: 0 /100 WBC
PLATELET # BLD AUTO: 197 K/UL (ref 150–450)
PMV BLD AUTO: 10 FL (ref 9.2–12.9)
POCT GLUCOSE: 146 MG/DL (ref 70–110)
POCT GLUCOSE: 146 MG/DL (ref 70–110)
POTASSIUM SERPL-SCNC: 3.7 MMOL/L (ref 3.5–5.1)
PROT SERPL-MCNC: 6.9 G/DL (ref 6–8.4)
RBC # BLD AUTO: 4.53 M/UL (ref 4.6–6.2)
SODIUM SERPL-SCNC: 136 MMOL/L (ref 136–145)
WBC # BLD AUTO: 8.03 K/UL (ref 3.9–12.7)

## 2024-01-18 PROCEDURE — 63600175 PHARM REV CODE 636 W HCPCS: Performed by: EMERGENCY MEDICINE

## 2024-01-18 PROCEDURE — 99900035 HC TECH TIME PER 15 MIN (STAT)

## 2024-01-18 PROCEDURE — 80053 COMPREHEN METABOLIC PANEL: CPT | Performed by: HOSPITALIST

## 2024-01-18 PROCEDURE — 83735 ASSAY OF MAGNESIUM: CPT | Performed by: HOSPITALIST

## 2024-01-18 PROCEDURE — 85025 COMPLETE CBC W/AUTO DIFF WBC: CPT | Performed by: STUDENT IN AN ORGANIZED HEALTH CARE EDUCATION/TRAINING PROGRAM

## 2024-01-18 PROCEDURE — 25000003 PHARM REV CODE 250: Performed by: STUDENT IN AN ORGANIZED HEALTH CARE EDUCATION/TRAINING PROGRAM

## 2024-01-18 PROCEDURE — 63600175 PHARM REV CODE 636 W HCPCS: Performed by: HOSPITALIST

## 2024-01-18 PROCEDURE — 25000003 PHARM REV CODE 250: Performed by: HOSPITALIST

## 2024-01-18 PROCEDURE — 99233 SBSQ HOSP IP/OBS HIGH 50: CPT | Mod: ,,, | Performed by: REGISTERED NURSE

## 2024-01-18 PROCEDURE — 99497 ADVNCD CARE PLAN 30 MIN: CPT | Mod: ,,, | Performed by: REGISTERED NURSE

## 2024-01-18 PROCEDURE — 99291 CRITICAL CARE FIRST HOUR: CPT | Mod: ,,, | Performed by: INTERNAL MEDICINE

## 2024-01-18 PROCEDURE — 51798 US URINE CAPACITY MEASURE: CPT

## 2024-01-18 PROCEDURE — 25000003 PHARM REV CODE 250: Performed by: INTERNAL MEDICINE

## 2024-01-18 PROCEDURE — 94761 N-INVAS EAR/PLS OXIMETRY MLT: CPT

## 2024-01-18 PROCEDURE — A4216 STERILE WATER/SALINE, 10 ML: HCPCS | Performed by: STUDENT IN AN ORGANIZED HEALTH CARE EDUCATION/TRAINING PROGRAM

## 2024-01-18 PROCEDURE — 51701 INSERT BLADDER CATHETER: CPT

## 2024-01-18 PROCEDURE — 63600175 PHARM REV CODE 636 W HCPCS: Performed by: REGISTERED NURSE

## 2024-01-18 RX ORDER — LORAZEPAM 2 MG/ML
1 INJECTION INTRAMUSCULAR ONCE
Status: COMPLETED | OUTPATIENT
Start: 2024-01-18 | End: 2024-01-18

## 2024-01-18 RX ORDER — OXYBUTYNIN CHLORIDE 5 MG/1
5 TABLET ORAL 3 TIMES DAILY
Status: COMPLETED | OUTPATIENT
Start: 2024-01-18 | End: 2024-01-18

## 2024-01-18 RX ORDER — CLOPIDOGREL BISULFATE 75 MG/1
75 TABLET ORAL DAILY
Qty: 30 TABLET | Refills: 11 | Status: SHIPPED | OUTPATIENT
Start: 2024-01-19 | End: 2025-01-18

## 2024-01-18 RX ORDER — NAPROXEN SODIUM 220 MG/1
81 TABLET, FILM COATED ORAL DAILY
Qty: 30 TABLET | Refills: 11 | Status: SHIPPED | OUTPATIENT
Start: 2024-01-19 | End: 2025-01-18

## 2024-01-18 RX ORDER — LORAZEPAM 2 MG/ML
2 INJECTION INTRAMUSCULAR
Status: DISCONTINUED | OUTPATIENT
Start: 2024-01-18 | End: 2024-01-18 | Stop reason: HOSPADM

## 2024-01-18 RX ADMIN — ATORVASTATIN CALCIUM 80 MG: 40 TABLET, FILM COATED ORAL at 08:01

## 2024-01-18 RX ADMIN — AMIODARONE HYDROCHLORIDE 0.5 MG/MIN: 1.8 INJECTION, SOLUTION INTRAVENOUS at 12:01

## 2024-01-18 RX ADMIN — METOPROLOL SUCCINATE 50 MG: 50 TABLET, EXTENDED RELEASE ORAL at 08:01

## 2024-01-18 RX ADMIN — LORAZEPAM 1 MG: 2 INJECTION INTRAMUSCULAR; INTRAVENOUS at 01:01

## 2024-01-18 RX ADMIN — OXYBUTYNIN CHLORIDE 5 MG: 5 TABLET ORAL at 04:01

## 2024-01-18 RX ADMIN — APIXABAN 5 MG: 5 TABLET, FILM COATED ORAL at 08:01

## 2024-01-18 RX ADMIN — CLOPIDOGREL BISULFATE 75 MG: 75 TABLET ORAL at 08:01

## 2024-01-18 RX ADMIN — Medication 10 ML: at 12:01

## 2024-01-18 RX ADMIN — OXYBUTYNIN CHLORIDE 5 MG: 5 TABLET ORAL at 08:01

## 2024-01-18 RX ADMIN — CEFTRIAXONE 2 G: 2 INJECTION, POWDER, FOR SOLUTION INTRAMUSCULAR; INTRAVENOUS at 08:01

## 2024-01-18 RX ADMIN — Medication 10 ML: at 11:01

## 2024-01-18 RX ADMIN — LORAZEPAM 2 MG: 2 INJECTION INTRAMUSCULAR; INTRAVENOUS at 04:01

## 2024-01-18 RX ADMIN — LORAZEPAM 1 MG: 2 INJECTION INTRAMUSCULAR; INTRAVENOUS at 12:01

## 2024-01-18 RX ADMIN — MUPIROCIN: 20 OINTMENT TOPICAL at 08:01

## 2024-01-18 RX ADMIN — DOXYCYCLINE 100 MG: 100 INJECTION, POWDER, LYOPHILIZED, FOR SOLUTION INTRAVENOUS at 04:01

## 2024-01-18 RX ADMIN — ASPIRIN 81 MG CHEWABLE TABLET 81 MG: 81 TABLET CHEWABLE at 08:01

## 2024-01-18 NOTE — NURSING
Pt restless, continuously trying to get up out of bed, stating that he has to urinate. Assisted pt to sit up on side of the bed to help facilitate this, but no success. Bladder scanned pt, 350 ml showing in bladder. Turned on faucet to try to help with urination as well, no success. Explained to pt that he may need a strait cath if he still feels like he has to urinate but cannot because he hasn't urinated since 3pm per report, he states that he would like to keep trying on his own before doing this. I explained to him that if he has another episode where he is anxious and trying to get out of bed r/t urination we will scan him again and then perform the strait cath. In the meantime we will do a bath, give his meds, along with melatonin to try to get him to relax and get some sleep. He agreed with this plan. Notified Dr. Wagner, verbal agreement from MD regarding strait cath plan

## 2024-01-18 NOTE — ASSESSMENT & PLAN NOTE
Patient patient with late presentation of MI.  Aneurysmal changes on EKG, troponins initial at 44, downtrending to 35.  Also altered sensorium.  High-risk for intubation per critical care team and will not be able to tolerate any kind of procedures because of his altered sensorium.  Since this is late presentation of MI, and it is not having ongoing chest pain and the troponins are downtrending, the risk of coronary angiogram at the current time far exceeds any potential benefit.  Continue aspirin Plavix heparin drip.  Recheck 2D echo    1/16:  Continues to be chest pain-free.  Continue medical management    1/17:  Continues to be chest pain-free.  Continue medical management.  Will do stress test as an outpatient to see if he has any significant areas of ischemia to see if he would benefit from revascularization.

## 2024-01-18 NOTE — SUBJECTIVE & OBJECTIVE
Interval History:  No chest pains  Heart rate better controlled.  Amiodarone drip discontinued      Past Medical History:   Diagnosis Date    Atrial fibrillation     CHF (congestive heart failure)     Clotting disorder     Diabetes mellitus     Hyperlipidemia     Hypertension     Stroke     Vision loss 2019       Past Surgical History:   Procedure Laterality Date    BRAIN SURGERY  2018       Review of patient's allergies indicates:  No Known Allergies    No current facility-administered medications on file prior to encounter.     Current Outpatient Medications on File Prior to Encounter   Medication Sig    apixaban (ELIQUIS) 5 mg Tab Take 1 tablet (5 mg total) by mouth 2 (two) times daily.    atorvastatin (LIPITOR) 80 MG tablet Take 1 tablet (80 mg total) by mouth once daily. Take 1 tab by mouth every night    brimonidine-timoloL (COMBIGAN) 0.2-0.5 % Drop Place 1 drop into the left eye 2 (two) times a day.    insulin (LANTUS SOLOSTAR U-100 INSULIN) glargine 100 units/mL (3mL) SubQ pen Inject 10 Units into the skin every evening.    latanoprost 0.005 % ophthalmic solution Place 1 drop into both eyes nightly.    metFORMIN (GLUCOPHAGE) 500 MG tablet Take 1 tablet (500 mg total) by mouth daily with breakfast.    metoprolol succinate (TOPROL-XL) 50 MG 24 hr tablet Take 1 tablet (50 mg total) by mouth once daily.    pantoprazole (PROTONIX) 40 MG tablet TAKE 1 TABLET BY MOUTH ONCE DAILY IN THE MORNING BEFORE BREAKFAST     Family History       Problem Relation (Age of Onset)    Alcohol abuse Brother    Arthritis Mother, Father    COPD Brother    Heart disease Father    No Known Problems Sister, Sister    Peripheral vascular disease Sister          Tobacco Use    Smoking status: Former     Current packs/day: 0.00     Average packs/day: 1 pack/day for 56.0 years (56.0 ttl pk-yrs)     Types: Cigarettes     Start date:      Quit date: 2019     Years since quittin.0    Smokeless tobacco: Never   Substance and  Sexual Activity    Alcohol use: No    Drug use: No    Sexual activity: Never     Review of Systems   Unable to perform ROS: Mental status change   Cardiovascular:  Negative for chest pain.     Objective:     Vital Signs (Most Recent):  Temp: 98.4 °F (36.9 °C) (01/18/24 1105)  Pulse: 88 (01/18/24 1700)  Resp: 20 (01/18/24 1700)  BP: 116/74 (01/18/24 1700)  SpO2: 95 % (01/18/24 1700) Vital Signs (24h Range):  Temp:  [97.9 °F (36.6 °C)-98.4 °F (36.9 °C)] 98.4 °F (36.9 °C)  Pulse:  [] 88  Resp:  [11-38] 20  SpO2:  [92 %-100 %] 95 %  BP: ()/(48-91) 116/74     Weight: 82.1 kg (181 lb)  Body mass index is 25.97 kg/m².    SpO2: 95 %         Intake/Output Summary (Last 24 hours) at 1/18/2024 1935  Last data filed at 1/18/2024 1730  Gross per 24 hour   Intake 597.51 ml   Output 1230 ml   Net -632.49 ml         Lines/Drains/Airways       None                    Physical Exam  Vitals reviewed.   Constitutional:       Appearance: He is well-developed. He is ill-appearing and toxic-appearing.      Comments: Unkempt appearance.   HENT:      Head: Normocephalic.   Eyes:      Conjunctiva/sclera: Conjunctivae normal.   Cardiovascular:      Rate and Rhythm: Tachycardia present. Rhythm irregular.      Heart sounds: Normal heart sounds.   Pulmonary:      Effort: Respiratory distress present.   Abdominal:      General: Bowel sounds are normal.      Palpations: Abdomen is soft.   Musculoskeletal:      Cervical back: Normal range of motion and neck supple.   Skin:     General: Skin is warm.   Neurological:      Mental Status: He is alert. He is disoriented.      Comments: Oriented only to self.          Significant Labs:         DATA:     Laboratory:  CBC:  Recent Labs   Lab 01/16/24  0739 01/17/24  0449 01/18/24  0400   WBC 12.99 H 10.12 8.03   Hemoglobin 14.6 13.5 L 13.4 L   Hematocrit 44.3 40.3 40.5   Platelets 210 211 197         CHEMISTRIES:  Recent Labs   Lab 03/04/21  0828 12/01/21  0959 12/31/23  1447 01/16/24  0739  01/17/24  0449 01/18/24  0400   Glucose 267 H 394 H   < > 199 H 248 H 163 H   Sodium 139 134 L   < > 136 134 L 136   Potassium 4.1 5.1   < > 3.8 3.3 L 3.7   BUN 20 11   < > 14 16 20   Creatinine 1.2 1.1   < > 1.0 1.0 1.1   eGFR if African American >60 >60.0  --   --   --   --    eGFR if non African American >60 >60.0  --   --   --   --    Calcium 9.7 10.1   < > 9.0 8.7 9.3   Magnesium  --   --    < > 1.7 1.6 1.6    < > = values in this interval not displayed.         CARDIAC BIOMARKERS:  Recent Labs   Lab 01/15/24  1640 01/15/24  2014 01/16/24  0032   Troponin I 40.118 H 35.094 H 29.547 H         COAGS:  Recent Labs   Lab 12/31/23  1447 01/15/24  1355   INR 1.1 1.2         LIPIDS/LFTS:  Recent Labs   Lab 12/01/21  0959 12/31/23  1447 01/01/24  0235 01/16/24  0739 01/17/24  0449 01/18/24  0400   Cholesterol 232 H 198  --   --   --   --    Triglycerides 275 H 319 H  --   --   --   --    HDL 40 37 L  --   --   --   --    LDL Cholesterol 137.0 97.2  --   --   --   --    Non-HDL Cholesterol 192 161  --   --   --   --    AST 20 19   < > 44 H 30 24   ALT 23 14   < > 19 17 15    < > = values in this interval not displayed.         Hemoglobin A1C   Date Value Ref Range Status   01/01/2024 11.9 (H) 4.0 - 5.6 % Final     Comment:     ADA Screening Guidelines:  5.7-6.4%  Consistent with prediabetes  >or=6.5%  Consistent with diabetes    High levels of fetal hemoglobin interfere with the HbA1C  assay. Heterozygous hemoglobin variants (HbS, HgC, etc)do  not significantly interfere with this assay.   However, presence of multiple variants may affect accuracy.     12/01/2021 >14.0 (H) 4.0 - 5.6 % Final     Comment:     ADA Screening Guidelines:  5.7-6.4%  Consistent with prediabetes  >or=6.5%  Consistent with diabetes    High levels of fetal hemoglobin interfere with the HbA1C  assay. Heterozygous hemoglobin variants (HbS, HgC, etc)do  not significantly interfere with this assay.   However, presence of multiple variants may affect  accuracy.     03/20/2019 7.3 (H) 4.0 - 5.6 % Final     Comment:     ADA Screening Guidelines:  5.7-6.4%  Consistent with prediabetes  >or=6.5%  Consistent with diabetes  High levels of fetal hemoglobin interfere with the HbA1C  assay. Heterozygous hemoglobin variants (HbS, HgC, etc)do  not significantly interfere with this assay.   However, presence of multiple variants may affect accuracy.         TSH  Recent Labs   Lab 12/31/23  1447 01/15/24  1640   TSH 11.456 H 8.398 H         The ASCVD Risk score (Melanie HEWITT, et al., 2019) failed to calculate for the following reasons:    The patient has a prior MI or stroke diagnosis       BNP    Lab Results   Component Value Date/Time     (H) 01/15/2024 01:55 PM    BNP 44 03/04/2021 08:28 AM            ECHO    Results for orders placed during the hospital encounter of 12/31/23    Echo    Interpretation Summary    Left Ventricle: The left ventricle is normal in size. Mildly increased wall thickness. There is mild concentric hypertrophy. Severe global hypokinesis present. There is severely reduced systolic function with a visually estimated ejection fraction of 10 -15%. Unable to assess diastolic function due to atrial fibrillation.    Right Ventricle: Mild right ventricular enlargement. Systolic function is severely reduced.    Mitral Valve: There is no stenosis. There is mild regurgitation.    Aorta: Aortic root is mildly dilated measuring 3.80 cm.    Pulmonary Artery: The estimated pulmonary artery systolic pressure is 32 mmHg.    Pt appears to be in AFib throughout exam.      Results for orders placed during the hospital encounter of 01/15/24    Echo    Interpretation Summary    Left Ventricle: The left ventricle is mildly dilated. Normal wall thickness. Regional wall motion abnormalities present. There is severely reduced systolic function with a visually estimated ejection fraction of 10 -15%. There is diastolic dysfunction.anteroseptal wall thinnned out and appears  infarcted    Right Ventricle: Normal right ventricular cavity size. Systolic function is normal.    Left Atrium: Left atrium is severely dilated.    Right Atrium: Right atrium is moderately dilated.    Mitral Valve: There is mild regurgitation.    Tricuspid Valve: There is mild regurgitation.    Pulmonary Artery: The estimated pulmonary artery systolic pressure is 31 mmHg.    IVC/SVC: Elevated venous pressure at 15 mmHg.      STRESS TEST    No results found for this or any previous visit.        CATH    No results found for this or any previous visit.

## 2024-01-18 NOTE — SUBJECTIVE & OBJECTIVE
Medications:  Continuous Infusions:  Scheduled Meds:   apixaban  5 mg Oral BID    aspirin  81 mg Oral Daily    atorvastatin  80 mg Oral Daily    cefTRIAXone (Rocephin) IV (PEDS and ADULTS)  2 g Intravenous Q24H    clopidogreL  75 mg Oral Daily    doxycycline (VIBRAMYCIN) IVPB  100 mg Intravenous Q12H    insulin detemir U-100  10 Units Subcutaneous QHS    metoprolol succinate  50 mg Oral Daily    mupirocin   Nasal BID    sodium chloride 0.9%  10 mL Intravenous Q6H     PRN Meds:acetaminophen, dextrose 10%, dextrose 10%, glucagon (human recombinant), insulin aspart U-100, lorazepam, melatonin, nitroGLYCERIN, ondansetron, Flushing PICC/Midline Protocol **AND** sodium chloride 0.9% **AND** sodium chloride 0.9%    Objective:     Vital Signs (Most Recent):  Temp: 98.4 °F (36.9 °C) (01/18/24 1105)  Pulse: 88 (01/18/24 1500)  Resp: (!) 29 (01/18/24 1500)  BP: 119/71 (01/18/24 1500)  SpO2: (!) 94 % (01/18/24 1500) Vital Signs (24h Range):  Temp:  [97.9 °F (36.6 °C)-98.4 °F (36.9 °C)] 98.4 °F (36.9 °C)  Pulse:  [] 88  Resp:  [11-38] 29  SpO2:  [92 %-100 %] 94 %  BP: ()/(48-88) 119/71     Weight: 82.1 kg (181 lb)  Body mass index is 25.97 kg/m².       Physical Exam  Vitals and nursing note reviewed.   Constitutional:       General: He is awake.      Appearance: He is ill-appearing.      Interventions: Nasal cannula in place.   HENT:      Head: Normocephalic and atraumatic.   Pulmonary:      Effort: Pulmonary effort is normal. No respiratory distress.   Abdominal:      Hernia: Hernia: chronically.   Neurological:      Mental Status: He is alert. He is disoriented.      Comments: [facility] delirium             Advance Care Planning   Advance Directives:   Living Will: No    [place]: No (Needs LAPOST prior to discharge if doesn't enlist in hospice prior to discharge)    Do Not Resuscitate Status: Yes    Medical Power of : No (wife is legal surrogate decision maker and preferred POA)      Decision Making:   Patient answered questions  Goals of Care: What is most important right now is to focus on spending time at home, avoiding the hospital, remaining as independent as possible, symptom/pain control, quality of life, even if it means sacrificing a little time. Accordingly, we have decided that the best plan to meet the patient's goals includes enrolling in hospice care.          Significant Labs: All pertinent labs within the past 24 hours have been reviewed.  CBC:   Recent Labs   Lab 01/18/24  0400   WBC 8.03   HGB 13.4*   HCT 40.5   MCV 89        BMP:  Recent Labs   Lab 01/18/24  0400   *      K 3.7   CL 99   CO2 27   BUN 20   CREATININE 1.1   CALCIUM 9.3   MG 1.6     LFT:  Lab Results   Component Value Date    AST 24 01/18/2024    ALKPHOS 57 01/18/2024    BILITOT 0.6 01/18/2024     Albumin:   Albumin   Date Value Ref Range Status   01/18/2024 3.4 (L) 3.5 - 5.2 g/dL Final     Protein:   Total Protein   Date Value Ref Range Status   01/18/2024 6.9 6.0 - 8.4 g/dL Final     Lactic acid:   Lab Results   Component Value Date    LACTATE 2.7 (H) 01/16/2024    LACTATE 2.8 (H) 01/15/2024       Significant Imaging: I have reviewed all pertinent imaging results/findings within the past 24 hours.

## 2024-01-18 NOTE — PLAN OF CARE
Per Zulay at [facility] patient's wife agreed to home hospice.  Orders sent to [facility].  Awaiting equipment delivery.  Wife waiting for family help to make room for the hospital bed.

## 2024-01-18 NOTE — NURSING
Pt became restless again, trying to get out of bed again trying to urinate. Again he could not go, sat up on side the bed again with no success. Bladder scan shows 370 ml. In/Out cath performed as per previous discussion with Dr. Wagner. 350 ml of urine out. Difficulty advancing catheter initially, painful to pt, paused insertion and pulled back, and readvanced at a more strait downward angle and was able to advance. There was urine leakage around the catheter insertion site, indicative of possible bladder spasm. Pt voiced relief of symptoms after emptying of bladder

## 2024-01-18 NOTE — NURSING
[facility] [place]  Nurses Note -- 4 Eyes      1/17/2024       Skin assessed on: Q Shift      [x] No Pressure Injuries Present    [x]Prevention Measures Documented    [] Yes LDA  for Pressure Injury Previously documented     [] Yes New Pressure Injury Discovered   [] LDA for New Pressure Injury Added      Attending RN:  Nisha Lyn RN     Second RN:  LASHONDA Morrow

## 2024-01-18 NOTE — SUBJECTIVE & OBJECTIVE
Interval History:  Chest pain-free.  More oriented today.  Sundowning at night.  Per nurse.  No chest pains      Past Medical History:   Diagnosis Date    Atrial fibrillation     CHF (congestive heart failure)     Clotting disorder     Diabetes mellitus     Hyperlipidemia     Hypertension     Stroke     Vision loss 2019       Past Surgical History:   Procedure Laterality Date    BRAIN SURGERY  2018       Review of patient's allergies indicates:  No Known Allergies    No current facility-administered medications on file prior to encounter.     Current Outpatient Medications on File Prior to Encounter   Medication Sig    apixaban (ELIQUIS) 5 mg Tab Take 1 tablet (5 mg total) by mouth 2 (two) times daily.    atorvastatin (LIPITOR) 80 MG tablet Take 1 tablet (80 mg total) by mouth once daily. Take 1 tab by mouth every night    brimonidine-timoloL (COMBIGAN) 0.2-0.5 % Drop Place 1 drop into the left eye 2 (two) times a day.    insulin (LANTUS SOLOSTAR U-100 INSULIN) glargine 100 units/mL (3mL) SubQ pen Inject 10 Units into the skin every evening.    latanoprost 0.005 % ophthalmic solution Place 1 drop into both eyes nightly.    metFORMIN (GLUCOPHAGE) 500 MG tablet Take 1 tablet (500 mg total) by mouth daily with breakfast.    metoprolol succinate (TOPROL-XL) 50 MG 24 hr tablet Take 1 tablet (50 mg total) by mouth once daily.    pantoprazole (PROTONIX) 40 MG tablet TAKE 1 TABLET BY MOUTH ONCE DAILY IN THE MORNING BEFORE BREAKFAST     Family History       Problem Relation (Age of Onset)    Alcohol abuse Brother    Arthritis Mother, Father    COPD Brother    Heart disease Father    No Known Problems Sister, Sister    Peripheral vascular disease Sister          Tobacco Use    Smoking status: Former     Current packs/day: 0.00     Average packs/day: 1 pack/day for 56.0 years (56.0 ttl pk-yrs)     Types: Cigarettes     Start date:      Quit date:      Years since quittin.0    Smokeless tobacco: Never    Substance and Sexual Activity    Alcohol use: No    Drug use: No    Sexual activity: Never     Review of Systems   Unable to perform ROS: Mental status change   Cardiovascular:  Negative for chest pain.     Objective:     Vital Signs (Most Recent):  Temp: 98.2 °F (36.8 °C) (01/17/24 1515)  Pulse: 85 (01/17/24 1800)  Resp: (!) 25 (01/17/24 1800)  BP: 99/61 (01/17/24 1800)  SpO2: 100 % (01/17/24 1800) Vital Signs (24h Range):  Temp:  [97.5 °F (36.4 °C)-98.3 °F (36.8 °C)] 98.2 °F (36.8 °C)  Pulse:  [] 85  Resp:  [11-38] 25  SpO2:  [84 %-100 %] 100 %  BP: ()/(51-96) 99/61     Weight: 82.1 kg (181 lb)  Body mass index is 25.97 kg/m².    SpO2: 100 %         Intake/Output Summary (Last 24 hours) at 1/17/2024 1845  Last data filed at 1/17/2024 1800  Gross per 24 hour   Intake 1162.39 ml   Output 450 ml   Net 712.39 ml         Lines/Drains/Airways       Peripherally Inserted Central Catheter Line  Duration             PICC Triple Lumen 01/16/24 1848 right basilic <1 day              Drain  Duration             Male External Urinary Catheter 01/16/24 0330 1 day              Peripheral Intravenous Line  Duration                  Peripheral IV - Single Lumen 01/15/24 1519 20 G Right Antecubital 2 days         Peripheral IV - Single Lumen 01/15/24 1706 20 G Posterior;Right Wrist 2 days                     Physical Exam  Vitals reviewed.   Constitutional:       Appearance: He is well-developed. He is ill-appearing and toxic-appearing.      Comments: Unkempt appearance.   HENT:      Head: Normocephalic.   Eyes:      Conjunctiva/sclera: Conjunctivae normal.   Cardiovascular:      Rate and Rhythm: Tachycardia present. Rhythm irregular.      Heart sounds: Normal heart sounds.   Pulmonary:      Effort: Respiratory distress present.   Abdominal:      General: Bowel sounds are normal.      Palpations: Abdomen is soft.   Musculoskeletal:      Cervical back: Normal range of motion and neck supple.   Skin:     General: Skin  is warm.   Neurological:      Mental Status: He is alert. He is disoriented.      Comments: Oriented only to self.          Significant Labs:         DATA:     Laboratory:  CBC:  Recent Labs   Lab 01/15/24  1355 01/16/24  0739 01/17/24  0449   WBC 13.80 H 12.99 H 10.12   Hemoglobin 15.9 14.6 13.5 L   Hematocrit 48.8 44.3 40.3   Platelets 225 210 211         CHEMISTRIES:  Recent Labs   Lab 03/04/21  0828 12/01/21  0959 12/31/23  1447 01/15/24  1355 01/16/24  0739 01/17/24  0449   Glucose 267 H 394 H   < > 258 H 199 H 248 H   Sodium 139 134 L   < > 137 136 134 L   Potassium 4.1 5.1   < > 4.6 3.8 3.3 L   BUN 20 11   < > 13 14 16   Creatinine 1.2 1.1   < > 1.0 1.0 1.0   eGFR if African American >60 >60.0  --   --   --   --    eGFR if non African American >60 >60.0  --   --   --   --    Calcium 9.7 10.1   < > 10.0 9.0 8.7   Magnesium  --   --   --  1.8 1.7 1.6    < > = values in this interval not displayed.         CARDIAC BIOMARKERS:  Recent Labs   Lab 01/15/24  1640 01/15/24  2014 01/16/24  0032   Troponin I 40.118 H 35.094 H 29.547 H         COAGS:  Recent Labs   Lab 12/31/23  1447 01/15/24  1355   INR 1.1 1.2         LIPIDS/LFTS:  Recent Labs   Lab 12/01/21  0959 12/31/23  1447 01/01/24  0235 01/15/24  1355 01/16/24  0739 01/17/24  0449   Cholesterol 232 H 198  --   --   --   --    Triglycerides 275 H 319 H  --   --   --   --    HDL 40 37 L  --   --   --   --    LDL Cholesterol 137.0 97.2  --   --   --   --    Non-HDL Cholesterol 192 161  --   --   --   --    AST 20 19   < > 100 H 44 H 30   ALT 23 14   < > 24 19 17    < > = values in this interval not displayed.         Hemoglobin A1C   Date Value Ref Range Status   01/01/2024 11.9 (H) 4.0 - 5.6 % Final     Comment:     ADA Screening Guidelines:  5.7-6.4%  Consistent with prediabetes  >or=6.5%  Consistent with diabetes    High levels of fetal hemoglobin interfere with the HbA1C  assay. Heterozygous hemoglobin variants (HbS, HgC, etc)do  not significantly interfere  with this assay.   However, presence of multiple variants may affect accuracy.     12/01/2021 >14.0 (H) 4.0 - 5.6 % Final     Comment:     ADA Screening Guidelines:  5.7-6.4%  Consistent with prediabetes  >or=6.5%  Consistent with diabetes    High levels of fetal hemoglobin interfere with the HbA1C  assay. Heterozygous hemoglobin variants (HbS, HgC, etc)do  not significantly interfere with this assay.   However, presence of multiple variants may affect accuracy.     03/20/2019 7.3 (H) 4.0 - 5.6 % Final     Comment:     ADA Screening Guidelines:  5.7-6.4%  Consistent with prediabetes  >or=6.5%  Consistent with diabetes  High levels of fetal hemoglobin interfere with the HbA1C  assay. Heterozygous hemoglobin variants (HbS, HgC, etc)do  not significantly interfere with this assay.   However, presence of multiple variants may affect accuracy.         TSH  Recent Labs   Lab 12/31/23  1447 01/15/24  1640   TSH 11.456 H 8.398 H         The ASCVD Risk score (Melanie DK, et al., 2019) failed to calculate for the following reasons:    The patient has a prior MI or stroke diagnosis       BNP    Lab Results   Component Value Date/Time     (H) 01/15/2024 01:55 PM    BNP 44 03/04/2021 08:28 AM            ECHO    Results for orders placed during the hospital encounter of 12/31/23    Echo    Interpretation Summary    Left Ventricle: The left ventricle is normal in size. Mildly increased wall thickness. There is mild concentric hypertrophy. Severe global hypokinesis present. There is severely reduced systolic function with a visually estimated ejection fraction of 10 -15%. Unable to assess diastolic function due to atrial fibrillation.    Right Ventricle: Mild right ventricular enlargement. Systolic function is severely reduced.    Mitral Valve: There is no stenosis. There is mild regurgitation.    Aorta: Aortic root is mildly dilated measuring 3.80 cm.    Pulmonary Artery: The estimated pulmonary artery systolic pressure is  32 mmHg.    Pt appears to be in AFib throughout exam.      Results for orders placed during the hospital encounter of 01/15/24    Echo    Interpretation Summary    Left Ventricle: The left ventricle is mildly dilated. Normal wall thickness. Regional wall motion abnormalities present. There is severely reduced systolic function with a visually estimated ejection fraction of 10 -15%. There is diastolic dysfunction.anteroseptal wall thinnned out and appears infarcted    Right Ventricle: Normal right ventricular cavity size. Systolic function is normal.    Left Atrium: Left atrium is severely dilated.    Right Atrium: Right atrium is moderately dilated.    Mitral Valve: There is mild regurgitation.    Tricuspid Valve: There is mild regurgitation.    Pulmonary Artery: The estimated pulmonary artery systolic pressure is 31 mmHg.    IVC/SVC: Elevated venous pressure at 15 mmHg.      STRESS TEST    No results found for this or any previous visit.        CATH    No results found for this or any previous visit.

## 2024-01-18 NOTE — PROGRESS NOTES
[facility] Intensive Care  Cardiology  Progress Note    Patient Name: Kamar Doshi  MRN: [id number]  Admission Date: 1/15/2024  Hospital Length of Stay: 2 days  Code Status: DNR   Attending Physician: Freddy Kern III, MD   Primary Care Physician: Bethel Harley MD  Expected Discharge Date: 1/19/2024  Principal Problem:NSTEMI (non-ST elevated myocardial infarction)    Subjective:       Interval History:  Chest pain-free.  More oriented today.  Sundowning at night.  Per nurse.  No chest pains      Past Medical History:   Diagnosis Date    Atrial fibrillation     CHF (congestive heart failure)     Clotting disorder     Diabetes mellitus     Hyperlipidemia     Hypertension     Stroke     Vision loss 01/01/2019       Past Surgical History:   Procedure Laterality Date    BRAIN SURGERY  01/01/2018       Review of patient's allergies indicates:  No Known Allergies    No current facility-administered medications on file prior to encounter.     Current Outpatient Medications on File Prior to Encounter   Medication Sig    apixaban (ELIQUIS) 5 mg Tab Take 1 tablet (5 mg total) by mouth 2 (two) times daily.    atorvastatin (LIPITOR) 80 MG tablet Take 1 tablet (80 mg total) by mouth once daily. Take 1 tab by mouth every night    brimonidine-timoloL (COMBIGAN) 0.2-0.5 % Drop Place 1 drop into the left eye 2 (two) times a day.    insulin (LANTUS SOLOSTAR U-100 INSULIN) glargine 100 units/mL (3mL) SubQ pen Inject 10 Units into the skin every evening.    latanoprost 0.005 % ophthalmic solution Place 1 drop into both eyes nightly.    metFORMIN (GLUCOPHAGE) 500 MG tablet Take 1 tablet (500 mg total) by mouth daily with breakfast.    metoprolol succinate (TOPROL-XL) 50 MG 24 hr tablet Take 1 tablet (50 mg total) by mouth once daily.    pantoprazole (PROTONIX) 40 MG tablet TAKE 1 TABLET BY MOUTH ONCE DAILY IN THE MORNING BEFORE BREAKFAST     Family History       Problem Relation (Age of Onset)    Alcohol abuse Brother     Arthritis Mother, Father    COPD Brother    Heart disease Father    No Known Problems Sister, Sister    Peripheral vascular disease Sister          Tobacco Use    Smoking status: Former     Current packs/day: 0.00     Average packs/day: 1 pack/day for 56.0 years (56.0 ttl pk-yrs)     Types: Cigarettes     Start date:      Quit date: 2019     Years since quittin.0    Smokeless tobacco: Never   Substance and Sexual Activity    Alcohol use: No    Drug use: No    Sexual activity: Never     Review of Systems   Unable to perform ROS: Mental status change   Cardiovascular:  Negative for chest pain.     Objective:     Vital Signs (Most Recent):  Temp: 98.2 °F (36.8 °C) (24 1515)  Pulse: 85 (24 1800)  Resp: (!) 25 (24 1800)  BP: 99/61 (24 1800)  SpO2: 100 % (24 1800) Vital Signs (24h Range):  Temp:  [97.5 °F (36.4 °C)-98.3 °F (36.8 °C)] 98.2 °F (36.8 °C)  Pulse:  [] 85  Resp:  [11-38] 25  SpO2:  [84 %-100 %] 100 %  BP: ()/(51-96) 99/61     Weight: 82.1 kg (181 lb)  Body mass index is 25.97 kg/m².    SpO2: 100 %         Intake/Output Summary (Last 24 hours) at 2024 1845  Last data filed at 2024 1800  Gross per 24 hour   Intake 1162.39 ml   Output 450 ml   Net 712.39 ml         Lines/Drains/Airways       Peripherally Inserted Central Catheter Line  Duration             PICC Triple Lumen 24 1848 right basilic <1 day              Drain  Duration             Male External Urinary Catheter 24 0330 1 day              Peripheral Intravenous Line  Duration                  Peripheral IV - Single Lumen 01/15/24 1519 20 G Right Antecubital 2 days         Peripheral IV - Single Lumen 01/15/24 1706 20 G Posterior;Right Wrist 2 days                     Physical Exam  Vitals reviewed.   Constitutional:       Appearance: He is well-developed. He is ill-appearing and toxic-appearing.      Comments: Unkempt appearance.   HENT:      Head: Normocephalic.   Eyes:       Conjunctiva/sclera: Conjunctivae normal.   Cardiovascular:      Rate and Rhythm: Tachycardia present. Rhythm irregular.      Heart sounds: Normal heart sounds.   Pulmonary:      Effort: Respiratory distress present.   Abdominal:      General: Bowel sounds are normal.      Palpations: Abdomen is soft.   Musculoskeletal:      Cervical back: Normal range of motion and neck supple.   Skin:     General: Skin is warm.   Neurological:      Mental Status: He is alert. He is disoriented.      Comments: Oriented only to self.          Significant Labs:         DATA:     Laboratory:  CBC:  Recent Labs   Lab 01/15/24  1355 01/16/24  0739 01/17/24  0449   WBC 13.80 H 12.99 H 10.12   Hemoglobin 15.9 14.6 13.5 L   Hematocrit 48.8 44.3 40.3   Platelets 225 210 211         CHEMISTRIES:  Recent Labs   Lab 03/04/21  0828 12/01/21  0959 12/31/23  1447 01/15/24  1355 01/16/24  0739 01/17/24  0449   Glucose 267 H 394 H   < > 258 H 199 H 248 H   Sodium 139 134 L   < > 137 136 134 L   Potassium 4.1 5.1   < > 4.6 3.8 3.3 L   BUN 20 11   < > 13 14 16   Creatinine 1.2 1.1   < > 1.0 1.0 1.0   eGFR if African American >60 >60.0  --   --   --   --    eGFR if non African American >60 >60.0  --   --   --   --    Calcium 9.7 10.1   < > 10.0 9.0 8.7   Magnesium  --   --   --  1.8 1.7 1.6    < > = values in this interval not displayed.         CARDIAC BIOMARKERS:  Recent Labs   Lab 01/15/24  1640 01/15/24  2014 01/16/24  0032   Troponin I 40.118 H 35.094 H 29.547 H         COAGS:  Recent Labs   Lab 12/31/23  1447 01/15/24  1355   INR 1.1 1.2         LIPIDS/LFTS:  Recent Labs   Lab 12/01/21  0959 12/31/23  1447 01/01/24  0235 01/15/24  1355 01/16/24  0739 01/17/24  0449   Cholesterol 232 H 198  --   --   --   --    Triglycerides 275 H 319 H  --   --   --   --    HDL 40 37 L  --   --   --   --    LDL Cholesterol 137.0 97.2  --   --   --   --    Non-HDL Cholesterol 192 161  --   --   --   --    AST 20 19   < > 100 H 44 H 30   ALT 23 14   < > 24 19 17     < > = values in this interval not displayed.         Hemoglobin A1C   Date Value Ref Range Status   01/01/2024 11.9 (H) 4.0 - 5.6 % Final     Comment:     ADA Screening Guidelines:  5.7-6.4%  Consistent with prediabetes  >or=6.5%  Consistent with diabetes    High levels of fetal hemoglobin interfere with the HbA1C  assay. Heterozygous hemoglobin variants (HbS, HgC, etc)do  not significantly interfere with this assay.   However, presence of multiple variants may affect accuracy.     12/01/2021 >14.0 (H) 4.0 - 5.6 % Final     Comment:     ADA Screening Guidelines:  5.7-6.4%  Consistent with prediabetes  >or=6.5%  Consistent with diabetes    High levels of fetal hemoglobin interfere with the HbA1C  assay. Heterozygous hemoglobin variants (HbS, HgC, etc)do  not significantly interfere with this assay.   However, presence of multiple variants may affect accuracy.     03/20/2019 7.3 (H) 4.0 - 5.6 % Final     Comment:     ADA Screening Guidelines:  5.7-6.4%  Consistent with prediabetes  >or=6.5%  Consistent with diabetes  High levels of fetal hemoglobin interfere with the HbA1C  assay. Heterozygous hemoglobin variants (HbS, HgC, etc)do  not significantly interfere with this assay.   However, presence of multiple variants may affect accuracy.         TSH  Recent Labs   Lab 12/31/23  1447 01/15/24  1640   TSH 11.456 H 8.398 H         The ASCVD Risk score (Melanie HEWITT, et al., 2019) failed to calculate for the following reasons:    The patient has a prior MI or stroke diagnosis       BNP    Lab Results   Component Value Date/Time     (H) 01/15/2024 01:55 PM    BNP 44 03/04/2021 08:28 AM            ECHO    Results for orders placed during the hospital encounter of 12/31/23    Echo    Interpretation Summary    Left Ventricle: The left ventricle is normal in size. Mildly increased wall thickness. There is mild concentric hypertrophy. Severe global hypokinesis present. There is severely reduced systolic function with a  visually estimated ejection fraction of 10 -15%. Unable to assess diastolic function due to atrial fibrillation.    Right Ventricle: Mild right ventricular enlargement. Systolic function is severely reduced.    Mitral Valve: There is no stenosis. There is mild regurgitation.    Aorta: Aortic root is mildly dilated measuring 3.80 cm.    Pulmonary Artery: The estimated pulmonary artery systolic pressure is 32 mmHg.    Pt appears to be in AFib throughout exam.      Results for orders placed during the hospital encounter of 01/15/24    Echo    Interpretation Summary    Left Ventricle: The left ventricle is mildly dilated. Normal wall thickness. Regional wall motion abnormalities present. There is severely reduced systolic function with a visually estimated ejection fraction of 10 -15%. There is diastolic dysfunction.anteroseptal wall thinnned out and appears infarcted    Right Ventricle: Normal right ventricular cavity size. Systolic function is normal.    Left Atrium: Left atrium is severely dilated.    Right Atrium: Right atrium is moderately dilated.    Mitral Valve: There is mild regurgitation.    Tricuspid Valve: There is mild regurgitation.    Pulmonary Artery: The estimated pulmonary artery systolic pressure is 31 mmHg.    IVC/SVC: Elevated venous pressure at 15 mmHg.      STRESS TEST    No results found for this or any previous visit.        CATH    No results found for this or any previous visit.     Assessment and Plan:     Brief HPI:     A-fib  Rate controlled.  Will titrate beta blockers as needed and tolerated.    Myocardial infarction  Patient patient with late presentation of MI.  Aneurysmal changes on EKG, troponins initial at 44, downtrending to 35.  Also altered sensorium.  High-risk for intubation per critical care team and will not be able to tolerate any kind of procedures because of his altered sensorium.  Since this is late presentation of MI, and it is not having ongoing chest pain and the  troponins are downtrending, the risk of coronary angiogram at the current time far exceeds any potential benefit.  Continue aspirin Plavix heparin drip.  Recheck 2D echo    1/16:  Continues to be chest pain-free.  Continue medical management    1/17:  Continues to be chest pain-free.  Continue medical management.  Will do stress test as an outpatient to see if he has any significant areas of ischemia to see if he would benefit from revascularization.    Acute on chronic combined systolic and diastolic heart failure  Last EF of 10%.  In 2018 had EF of 40-45%.  IV diuresis.  Will initiate guideline medical therapy when possible.    Type 2 diabetes mellitus with diabetic cataract, without long-term current use of insulin        History of embolic stroke L MCA; left basal ganglia; hospitalization [facility] 1/2019            VTE Risk Mitigation (From admission, onward)           Ordered     apixaban tablet 5 mg  2 times daily         01/17/24 1646     IP VTE HIGH RISK PATIENT  Once         01/15/24 1626     Place sequential compression device  Until discontinued         01/15/24 1626                    Kamari Sommer MD  Cardiology  [facility] - Intensive Care    Critical Care Time: 35  minutes     Critical care was time spent personally by me on the following activities: development of treatment plan with patient or surrogate and bedside caregivers, discussions with consultants, evaluation of patient's response to treatment, examination of patient, ordering and performing treatments and interventions, ordering and review of laboratory studies, ordering and review of radiographic studies, pulse oximetry, re-evaluation of patient's condition. This critical care time did not overlap with that of any other provider or involve time for any procedures.

## 2024-01-18 NOTE — NURSING
Dropped Eliquist and Melatonin on the floor while attempting to admin. Will pull another dose of each

## 2024-01-18 NOTE — NURSING
[facility]  Nurses Note -- 4 Eyes      1/18/2024       Skin assessed on: Q Shift      [x] No Pressure Injuries Present    [x]Prevention Measures Documented    [] Yes LDA  for Pressure Injury Previously documented     [] Yes New Pressure Injury Discovered   [] LDA for New Pressure Injury Added      Attending RN:  Nilam Coburn RN     Second RN:  Nisha Lyn RN

## 2024-01-18 NOTE — NURSING
EMS came and took the patient home. His discharge instructions and glasses were sent with him. His wife was called.

## 2024-01-18 NOTE — PLAN OF CARE
Case Management Final Discharge Note      Discharge Disposition: Home with [facility]    New DME ordered / company name: n/a    Relevant SDOH / Transition of Care Barriers:  none    Primary Caretaker and contact information: wife    Scheduled followup appointment: n/a    Referrals placed: n/a    Transportation: Ambulance for safety        Patient and family educated on discharge services and updated on DC plan. Bedside RN notified, patient clear to discharge from Case Management Perspective.        01/18/24 1546   Final Note   Assessment Type Final Discharge Note   Anticipated Discharge Disposition HospiceHome   Post-Acute Status   Post-Acute Authorization Hospice   Hospice Status Set-up Complete/Auth obtained   Discharge Delays (!) PFC Arranged Transportation

## 2024-01-18 NOTE — ASSESSMENT & PLAN NOTE
1/18/2024  - interval chart reviewed; discussed pt with MDT during ICU rounds   - pt with AMS/delirium today (see encephalopathy); unable to participate in GOC/ACP discussions   - wife at home receiving hospice equipment with plan for pt to discharge home today   - ongoing coordination with MDT and hospice provider for plan of care and maintaining pt safety given delirium prior to discharge   - recommend avoiding restrains if at all possible due to hospice status; discussed need for sitter until discharge with staff/ICU charge and manager as RN was having to constantly stay in room for pt's safety      Consult - 1/17/2023  - consult received; interval chart reviewed in detail; discussed pt with MDT during ICU rounds   - met with patient and wife at bedside; introduction to palliative medicine team and role in current care and admission   - learned more about pt outside of current admission; he lives at home with wife and brother in law (who wife also cares for following stroke); they do not have any children but have 15 cats that they care for; pt shares little difficulty with ambulation at home and does not use walker but has a wheelchair for errands that involve a lot of walking (uses power scooter at grocery when available)   - overall both pt and wife seem to have a poor understanding of pt's multiple comorbidities and life-limiting conditions; very limited understanding of CHF especially  - simple overview of concepts of pts life-limiting conditions and trajectory discussed with both pt and wife   - GOC/ACP discussion; GOC for focus on spending time at home/outside of hospital and QOL; pt does not wish to make major life style changes in order to improve health status; understands that this may mean sacrificing some time and not optimizing his conditions   - pt feels that he has a good quality of life; he enjoys his wife taking him on car rides daily and will even sit in car while she run errands to get out of  "the house as he doesn't like being "stuck at home" all day; he looks forward to their trips to [facility] twice a week, although his overall intake has been decreasing (which he admits is due to fatigue with prolonged eating)  - Given EF 10%, initiated code status discussion, which pt denies discussing with providers in the past (pt and wife had also never directly discussed before; in depeth discussion of full code vs DNR and associated interventions, risks, and potential outcomes; pt wishes for DNR; reviewed to confirm understanding of these wishes; pt confirms wishes for DNR and "when its his time" (discussed with primary and DNR order placed)   - also discussed future healthcare goals as pt's shared life style goals as well as medical qualifications seem to align with hospice; discussed home palliative vs home hospice, philosophy of care of each; transparent recommendation that based off information pt has shared he seems to have GOC that align with hospice; pt and wife agreeable to informational session with a provider that can offer palliative and or hospice services at home based off GOC at this time   - Will need LAPOST completed prior to discharge (if not discharging with hospice and/or not completed with hospice)   - pt denies spiritual/[employer] needs, not important aspect of care/life per pt and wife  - emotional support provided   - Allowed time for questions/concerns; all addressed; expressed availability of myself/palliative team for additional questions/concerns   - update primary and CM/SW; informational session coordinate with [facility]di by LASHONDA Haider; provided Zulay with [facility]di with updates of prior discussion with pt/wife   "

## 2024-01-18 NOTE — PLAN OF CARE
[facility]  Department of Hospital Medicine  [address] [place] [address]  [phone] after hours  [phone] fax    HOSPICE  ORDERS    01/18/2024    Admit to Hospice:  Home Service   Diagnoses:   Active Hospital Problems    Diagnosis  POA    *NSTEMI (non-ST elevated myocardial infarction) [I21.4]  Yes    Goals of care, counseling/discussion [Z71.89]  Not Applicable    Acute hypoxemic respiratory failure [J96.01]  Yes    Leukocytosis [D72.829]  Yes    Acute metabolic encephalopathy [G93.41]  Yes    Myocardial infarction [I21.9]  Yes    A-fib [I48.91]  Yes    Abdominal aortic aneurysm (AAA) without rupture on CT 3/4/21 [I71.40]  Yes     3/4/21 CT abd/pelvis: Saccular infrarenal abdominal aortic aneurysm that measures 33 mm.      Chronic atrial fibrillation with RVR [I48.20]  Yes    Acute on chronic combined systolic and diastolic heart failure [I50.43]  Yes     01/02/2018 TTE LV normal size, thickness, mildly decreased LV systolic function LVEF 40-45%. A bubble study was done and appeared to be negative at rest and with valsalva.   01/01/2024 TTE LV normal size, mildly increased wall thickness, mild concentric hypertrophy.  Severe global hypokinesis, severely reduced systolic function LVEF 10-15%.  Unable to assess diastolic function due to atrial fibrillation.  AFib throughout exam.      Type 2 diabetes mellitus with diabetic cataract, without long-term current use of insulin [E11.36]  Yes    History of embolic stroke L MCA; left basal ganglia; hospitalization [facility] 1/2019 [Z86.73]  Not Applicable     Cerebral perfusion study with contrast (01/01/2019):  IMPRESSION:   Left MCA infarct with 30-40% of the infarct territory representing core. This predominantly involves the frontal lobe.      CT angiogram of the head (01/01/2019):  IMPRESSION:   1. 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  2. Severe stenosis in the proximal right internal carotid  artery.    MRI of the brain without contrast (01/02/2019):   IMPRESSION:   Recent hemorrhagic infarct centered in the left basal ganglia, involving the left putamen, left globus pallidus, left internal capsule, left caudate head, and the medial aspect of the left temporal lobe. Associated mass affect with partial effacement of the left lateral ventricle. Findings are similar to the most recent CT head. Consider follow-up CT head to evaluate for interval change.  Evidence of patchy bilateral chronic white matter ischemia.  Mild generalized cerebral atrophy.    CT of the head without contrast (01/02/2019):  IMPRESSION:   Impression:  1.   Evolving recent left basal ganglia/left caudate head ischemia now with new patchy hemorrhagic conversion in the left basal ganglia. There is some mild localized mass effect with slight new effacement of the left frontal horn of the lateral ventricle and mild increased prominence of the right lateral ventricle although without overt hydrocephalus at this time. New 1-2 mm left to right midline shift.   2.   Background cerebral atrophy and chronic microangiopathy.    1/1/24 CT head (admission for weakness)  No acute intracranial process with no significant change.  See above comments.   involutional changes with chronic microvascular ischemic changes.  Remote lacunar infarction of the left lateral basal ganglia, caudate and internal capsule.  There is no acute abnormality.         Resolved Hospital Problems   No resolved problems to display.       Hospice Qualifying Diagnoses:        Patient has a life expectancy < 6 months due to:  Primary Hospice Diagnosis:  Heart failure  Comorbid Conditions Contributing to Decline:  STEMI diabetes previous stroke with debility.    Vital Signs: Routine per Hospice Protocol.    Code Status: DNR    Allergies: Review of patient's allergies indicates:  No Known Allergies    Diet: Regular    Activities: As tolerated    Goals of Care Treatment  Preferences:  Code Status: DNR                Nursing: Per Hospice Routine.      Routine Skin for Bedridden Patients: Apply moisture barrier cream to all skin folds and   wet areas in perineal area daily and after baths and all bowel movements.      Oxygen: 2L prn    Medications:        Medication List        START taking these medications      aspirin 81 MG Chew  Take 1 tablet (81 mg total) by mouth once daily.  Start taking on: January 19, 2024     clopidogreL 75 mg tablet  Commonly known as: PLAVIX  Take 1 tablet (75 mg total) by mouth once daily.  Start taking on: January 19, 2024            CONTINUE taking these medications      apixaban 5 mg Tab  Commonly known as: ELIQUIS  Take 1 tablet (5 mg total) by mouth 2 (two) times daily.     atorvastatin 80 MG tablet  Commonly known as: LIPITOR  Take 1 tablet (80 mg total) by mouth once daily. Take 1 tab by mouth every night     brimonidine-timoloL 0.2-0.5 % Drop  Commonly known as: COMBIGAN  Place 1 drop into the left eye 2 (two) times a day.     LANTUS SOLOSTAR U-100 INSULIN glargine 100 units/mL SubQ pen  Generic drug: insulin  Inject 10 Units into the skin every evening.     latanoprost 0.005 % ophthalmic solution  Place 1 drop into both eyes nightly.     metFORMIN 500 MG tablet  Commonly known as: GLUCOPHAGE  Take 1 tablet (500 mg total) by mouth daily with breakfast.     metoprolol succinate 50 MG 24 hr tablet  Commonly known as: TOPROL-XL  Take 1 tablet (50 mg total) by mouth once daily.     pantoprazole 40 MG tablet  Commonly known as: PROTONIX  TAKE 1 TABLET BY MOUTH ONCE DAILY IN THE MORNING BEFORE BREAKFAST                DIABETES CARE:   Nurse to perform and educate diabetic management with blood glucose monitoring:            Fingerstick blood sugar AC and HS        Report CBG < 60 or > 350 to physician.         Insulin Sliding Scale         Glucose  Novolog Insulin Subcutaneous        0 - 60   Orange juice or glucose tablet      No  insulin   201-250  2 units   251-300  4 units   301-350  6 units   351-400  8 units   >400   10 units then call physician      Future Orders:  Hospice Medical Director may dictate new orders for comfortable care measures & sign death certificate.        _________________________________  Freddy Kern III, MD  01/18/2024

## 2024-01-18 NOTE — ASSESSMENT & PLAN NOTE
- was oriented and able to have clear appropriate GOC discussions 1/17 at time of consult; per report for care providers ongoing confusion throughout afternoon/evening   - by 1/18 pt with delerium and hallucinations; by midday was restless, pulling at lines/IV, being aggressive with RN; Ativan 1mg IV given initially but additional 1mg IV was required to achieve comfort; Ativan 2mg IV PRN ordered until discharge home with hospice   - discussed recommendation for Seroquel 50mg HS with hospice provider until mental status improves once home, hopefully will improve once in home setting and with wife; Ativan PRN already available in home as well per hospice provider

## 2024-01-18 NOTE — PROGRESS NOTES
[facility] - Intensive Care  Palliative Medicine  Progress Note    Patient Name: Kamar Doshi  MRN: [id number]  Admission Date: 1/15/2024  Hospital Length of Stay: 3 days  Code Status: DNR   Attending Provider: Freddy Kern III, MD  Consulting Provider: Ondina Coleman NP  Primary Care Physician: Bethel Harley MD  Principal Problem:NSTEMI (non-ST elevated myocardial infarction)    Patient information was obtained from patient and primary team.      Assessment/Plan:   Advance Care Planning   Palliative Care  Goals of care, counseling/discussion  1/18/2024  - interval chart reviewed; discussed pt with MDT during ICU rounds   - pt with AMS/delirium today (see encephalopathy); unable to participate in GOC/ACP discussions   - wife at home receiving hospice equipment with plan for pt to discharge home today   - ongoing coordination with MDT and hospice provider for plan of care and maintaining pt safety given delirium prior to discharge   - recommend avoiding restrains if at all possible due to hospice status; discussed need for sitter until discharge with staff/ICU charge and manager as RN was having to constantly stay in room for pt's safety      Consult - 1/17/2023  - consult received; interval chart reviewed in detail; discussed pt with MDT during ICU rounds   - met with patient and wife at bedside; introduction to palliative medicine team and role in current care and admission   - learned more about pt outside of current admission; he lives at home with wife and brother in law (who wife also cares for following stroke); they do not have any children but have 15 cats that they care for; pt shares little difficulty with ambulation at home and does not use walker but has a wheelchair for errands that involve a lot of walking (uses power scooter at grocery when available)   - overall both pt and wife seem to have a poor understanding of pt's multiple comorbidities and life-limiting conditions; very limited  "understanding of CHF especially  - simple overview of concepts of pts life-limiting conditions and trajectory discussed with both pt and wife   - GOC/ACP discussion; GOC for focus on spending time at home/outside of hospital and QOL; pt does not wish to make major life style changes in order to improve health status; understands that this may mean sacrificing some time and not optimizing his conditions   - pt feels that he has a good quality of life; he enjoys his wife taking him on car rides daily and will even sit in car while she run errands to get out of the house as he doesn't like being "stuck at home" all day; he looks forward to their trips to [email or web address] twice a week, although his overall intake has been decreasing (which he admits is due to fatigue with prolonged eating)  - Given EF 10%, initiated code status discussion, which pt denies discussing with providers in the past (pt and wife had also never directly discussed before; in depeth discussion of full code vs DNR and associated interventions, risks, and potential outcomes; pt wishes for DNR; reviewed to confirm understanding of these wishes; pt confirms wishes for DNR and "when its his time" (discussed with primary and DNR order placed)   - also discussed future healthcare goals as pt's shared life style goals as well as medical qualifications seem to align with hospice; discussed home palliative vs home hospice, philosophy of care of each; transparent recommendation that based off information pt has shared he seems to have GOC that align with hospice; pt and wife agreeable to informational session with a provider that can offer palliative and or hospice services at home based off GOC at this time   - Will need LAPOST completed prior to discharge (if not discharging with hospice and/or not completed with hospice)   - pt denies spiritual/Zoroastrianism needs, not important aspect of care/life per pt and wife  - emotional support provided   - Allowed time " for questions/concerns; all addressed; expressed availability of myself/palliative team for additional questions/concerns   - update primary and CM/SW; informational session coordinate with Mehdi by LASHONDA Haider; provided Zulay with Mehdi with updates of prior discussion with pt/wife     Neuro  Acute metabolic encephalopathy  - was oriented and able to have clear appropriate GOC discussions 1/17 at time of consult; per report for care providers ongoing confusion throughout afternoon/evening   - by 1/18 pt with delerium and hallucinations; by midday was restless, pulling at lines/IV, being aggressive with RN; Ativan 1mg IV given initially but additional 1mg IV was required to achieve comfort; Ativan 2mg IV PRN ordered until discharge home with hospice   - discussed recommendation for Seroquel 50mg HS with hospice provider until mental status improves once home, hopefully will improve once in home setting and with wife; Ativan PRN already available in home as well per hospice provider     History of embolic stroke L MCA; left basal ganglia; hospitalization [facility] 1/2019  - history noted; pt denies major deficits     Cardiac/Vascular  * NSTEMI (non-ST elevated myocardial infarction)  - cardiology following; intervention risk outweighs benefit at this time per Cardiology, medical management recommended   - noted; continued management per cardiology and primary     A-fib  - cardiology following; improved rate now   - denies symptoms when in Afib   - noted; continued management per hospital primary     Acute on chronic combined systolic and diastolic heart failure  - EF 10%  - pt and wife with very limited understanding of CHF  - pt denies significant symptoms at home but does have steady decline in stamina and abilities   - discussed trajectory of life limiting condition (see GOC/ACP)   - hospice qualifying if/when aligns with GOC     Endocrine  Type 2 diabetes mellitus with diabetic cataract, without long-term  "current use of insulin  - pt and wife with poor health literacy and understanding of DM management   - drinks sodas regular during the day, elevated A1C on admit   - discussed current life style/diet vs recommended DM care vs focus QOL; pt values continuing his current life style and QOL over optimization of his DM (see GOC/ACP)   - continued management per hospital primary     I will follow-up with patient. Please contact us if you have any additional questions.    Total visit time: 55 minutes    35 min visit time including: face to face time in discussion of symptom assessment, and exploring options and burdens of offered treatments.  This also includes non-face to face time preparing to see the patient including chart review, obtaining and/or reviewing separately obtained history, documenting clinical information in the electronic or other health record, independently interpreting results and communicating results to the patient/family/caregiver, family discussions by phone if not able to be present, coordination of care with other specialists, and discharge planning.     20 min ACP time spent: coordination of hospice plan of care with MDT, goals of care, advanced care planning, emotional support, formulating and communicating prognosis, exploring burden/ benefit of various approaches of treatment.     Ondina Coleman NP  Palliative Medicine  [facility]      Subjective:     Chief Complaint:   Chief Complaint   Patient presents with    Altered Mental Status     Pt reports to Ed via EMS for altered mental status for past couple of days. Pt follows commands, no verbal response just grunting.        HPI:   From H&P: " Mr Kamar Doshi is a 73 y.o. man with CHF EF 10%, history of CVA, DM who presented with altered mental status. He is currently able to wake up on BiPAP and answers simple questions. He denies pain and denies chest pain. Further history from ED records- per wife, he has " "aphasia, had been less interactive/more lethargic over past few days. Presented to ED. Soiled on ED admit. Initial concern for sepsis for Afib RVR. Given metoprolol, vanc, zosyn, 500cc IVF bolus. Labs resulted with NSTEMI. Given asa 325, plavix 600, and started heparin gtt. Developed respiratory distress, started BiPAP, and given neb and total 60mg IV lasix. Admitted to ICU for further workup.  "    Palliative medicine consulted for goals of care discussion and advance care planning; for details of visit, see advance care planning section of plan.     Hospital Course:  No notes on file    Medications:  Continuous Infusions:  Scheduled Meds:   apixaban  5 mg Oral BID    aspirin  81 mg Oral Daily    atorvastatin  80 mg Oral Daily    cefTRIAXone (Rocephin) IV (PEDS and ADULTS)  2 g Intravenous Q24H    clopidogreL  75 mg Oral Daily    doxycycline (VIBRAMYCIN) IVPB  100 mg Intravenous Q12H    insulin detemir U-100  10 Units Subcutaneous QHS    metoprolol succinate  50 mg Oral Daily    mupirocin   Nasal BID    sodium chloride 0.9%  10 mL Intravenous Q6H     PRN Meds:acetaminophen, dextrose 10%, dextrose 10%, glucagon (human recombinant), insulin aspart U-100, lorazepam, melatonin, nitroGLYCERIN, ondansetron, Flushing PICC/Midline Protocol **AND** sodium chloride 0.9% **AND** sodium chloride 0.9%    Objective:     Vital Signs (Most Recent):  Temp: 98.4 °F (36.9 °C) (01/18/24 1105)  Pulse: 88 (01/18/24 1500)  Resp: (!) 29 (01/18/24 1500)  BP: 119/71 (01/18/24 1500)  SpO2: (!) 94 % (01/18/24 1500) Vital Signs (24h Range):  Temp:  [97.9 °F (36.6 °C)-98.4 °F (36.9 °C)] 98.4 °F (36.9 °C)  Pulse:  [] 88  Resp:  [11-38] 29  SpO2:  [92 %-100 %] 94 %  BP: ()/(48-88) 119/71     Weight: 82.1 kg (181 lb)  Body mass index is 25.97 kg/m².       Physical Exam  Vitals and nursing note reviewed.   Constitutional:       General: He is awake.      Appearance: He is ill-appearing.      Interventions: Nasal cannula in place. "   HENT:      Head: Normocephalic and atraumatic.   Pulmonary:      Effort: Pulmonary effort is normal. No respiratory distress.   Abdominal:      Hernia: Hernia: chronically.   Neurological:      Mental Status: He is alert. He is disoriented.      Comments: [facility] delirium             Advance Care Planning  Advance Directives:   Living Will: No    LaPOST: No (Needs LAPOST prior to discharge if doesn't enlist in hospice prior to discharge)    Do Not Resuscitate Status: Yes    Medical Power of : No (wife is legal surrogate decision maker and preferred POA)      Decision Making:  Patient answered questions  Goals of Care: What is most important right now is to focus on spending time at home, avoiding the hospital, remaining as independent as possible, symptom/pain control, quality of life, even if it means sacrificing a little time. Accordingly, we have decided that the best plan to meet the patient's goals includes enrolling in hospice care.          Significant Labs: All pertinent labs within the past 24 hours have been reviewed.  CBC:   Recent Labs   Lab 01/18/24  0400   WBC 8.03   HGB 13.4*   HCT 40.5   MCV 89        BMP:  Recent Labs   Lab 01/18/24  0400   *      K 3.7   CL 99   CO2 27   BUN 20   CREATININE 1.1   CALCIUM 9.3   MG 1.6     LFT:  Lab Results   Component Value Date    AST 24 01/18/2024    ALKPHOS 57 01/18/2024    BILITOT 0.6 01/18/2024     Albumin:   Albumin   Date Value Ref Range Status   01/18/2024 3.4 (L) 3.5 - 5.2 g/dL Final     Protein:   Total Protein   Date Value Ref Range Status   01/18/2024 6.9 6.0 - 8.4 g/dL Final     Lactic acid:   Lab Results   Component Value Date    LACTATE 2.7 (H) 01/16/2024    LACTATE 2.8 (H) 01/15/2024       Significant Imaging: I have reviewed all pertinent imaging results/findings within the past 24 hours.    Ondina Coleman NP  Palliative Medicine  [facility] [place] - Intensive Care

## 2024-01-18 NOTE — PLAN OF CARE
Pt free from fall/injury, remains on bedrest but probably appropriate to start PT/OT if needed, stood on side of bed with 2 RN's and did well. Issues with bladder spasms resolved with oxybutnin. Appears to be more oriented. Afib rate controlled, still on amio

## 2024-01-18 NOTE — NURSING
Pt getting up again with urgency symptoms. Just performed strait cath at 0220, another scan shows 100 mls in bladder. Stood pt up on side up bed with another nurse and pt unable to void. Suspect bladder spasms at this time. Notified Dr. Fuentes, awaiting orders

## 2024-01-19 LAB
BACTERIA BLD CULT: NORMAL
BACTERIA BLD CULT: NORMAL

## 2024-01-19 NOTE — PROGRESS NOTES
[facility] Intensive Care  Cardiology  Progress Note    Patient Name: Kamar Doshi  MRN: [id number]  Admission Date: 1/15/2024  Hospital Length of Stay: 3 days  Code Status: DNR   Attending Physician: Freddy Kern III, MD   Primary Care Physician: Bethel Harley MD  Expected Discharge Date: 1/18/2024  Principal Problem:NSTEMI (non-ST elevated myocardial infarction)    Subjective:       Interval History:  No chest pains  Heart rate better controlled.  Amiodarone drip discontinued      Past Medical History:   Diagnosis Date    Atrial fibrillation     CHF (congestive heart failure)     Clotting disorder     Diabetes mellitus     Hyperlipidemia     Hypertension     Stroke     Vision loss 01/01/2019       Past Surgical History:   Procedure Laterality Date    BRAIN SURGERY  01/01/2018       Review of patient's allergies indicates:  No Known Allergies    No current facility-administered medications on file prior to encounter.     Current Outpatient Medications on File Prior to Encounter   Medication Sig    apixaban (ELIQUIS) 5 mg Tab Take 1 tablet (5 mg total) by mouth 2 (two) times daily.    atorvastatin (LIPITOR) 80 MG tablet Take 1 tablet (80 mg total) by mouth once daily. Take 1 tab by mouth every night    brimonidine-timoloL (COMBIGAN) 0.2-0.5 % Drop Place 1 drop into the left eye 2 (two) times a day.    insulin (LANTUS SOLOSTAR U-100 INSULIN) glargine 100 units/mL (3mL) SubQ pen Inject 10 Units into the skin every evening.    latanoprost 0.005 % ophthalmic solution Place 1 drop into both eyes nightly.    metFORMIN (GLUCOPHAGE) 500 MG tablet Take 1 tablet (500 mg total) by mouth daily with breakfast.    metoprolol succinate (TOPROL-XL) 50 MG 24 hr tablet Take 1 tablet (50 mg total) by mouth once daily.    pantoprazole (PROTONIX) 40 MG tablet TAKE 1 TABLET BY MOUTH ONCE DAILY IN THE MORNING BEFORE BREAKFAST     Family History       Problem Relation (Age of Onset)    Alcohol abuse Brother    Arthritis Mother,  Father    COPD Brother    Heart disease Father    No Known Problems Sister, Sister    Peripheral vascular disease Sister          Tobacco Use    Smoking status: Former     Current packs/day: 0.00     Average packs/day: 1 pack/day for 56.0 years (56.0 ttl pk-yrs)     Types: Cigarettes     Start date:      Quit date: 2019     Years since quittin.0    Smokeless tobacco: Never   Substance and Sexual Activity    Alcohol use: No    Drug use: No    Sexual activity: Never     Review of Systems   Unable to perform ROS: Mental status change   Cardiovascular:  Negative for chest pain.     Objective:     Vital Signs (Most Recent):  Temp: 98.4 °F (36.9 °C) (24 1105)  Pulse: 88 (24 1700)  Resp: 20 (24 1700)  BP: 116/74 (24 1700)  SpO2: 95 % (24 1700) Vital Signs (24h Range):  Temp:  [97.9 °F (36.6 °C)-98.4 °F (36.9 °C)] 98.4 °F (36.9 °C)  Pulse:  [] 88  Resp:  [11-38] 20  SpO2:  [92 %-100 %] 95 %  BP: ()/(48-91) 116/74     Weight: 82.1 kg (181 lb)  Body mass index is 25.97 kg/m².    SpO2: 95 %         Intake/Output Summary (Last 24 hours) at 2024 193  Last data filed at 2024 1730  Gross per 24 hour   Intake 597.51 ml   Output 1230 ml   Net -632.49 ml         Lines/Drains/Airways       None                    Physical Exam  Vitals reviewed.   Constitutional:       Appearance: He is well-developed. He is ill-appearing and toxic-appearing.      Comments: Unkempt appearance.   HENT:      Head: Normocephalic.   Eyes:      Conjunctiva/sclera: Conjunctivae normal.   Cardiovascular:      Rate and Rhythm: Tachycardia present. Rhythm irregular.      Heart sounds: Normal heart sounds.   Pulmonary:      Effort: Respiratory distress present.   Abdominal:      General: Bowel sounds are normal.      Palpations: Abdomen is soft.   Musculoskeletal:      Cervical back: Normal range of motion and neck supple.   Skin:     General: Skin is warm.   Neurological:      Mental Status: He is  alert. He is disoriented.      Comments: Oriented only to self.          Significant Labs:         DATA:     Laboratory:  CBC:  Recent Labs   Lab 01/16/24  0739 01/17/24  0449 01/18/24  0400   WBC 12.99 H 10.12 8.03   Hemoglobin 14.6 13.5 L 13.4 L   Hematocrit 44.3 40.3 40.5   Platelets 210 211 197         CHEMISTRIES:  Recent Labs   Lab 03/04/21  0828 12/01/21  0959 12/31/23  1447 01/16/24  0739 01/17/24 0449 01/18/24  0400   Glucose 267 H 394 H   < > 199 H 248 H 163 H   Sodium 139 134 L   < > 136 134 L 136   Potassium 4.1 5.1   < > 3.8 3.3 L 3.7   BUN 20 11   < > 14 16 20   Creatinine 1.2 1.1   < > 1.0 1.0 1.1   eGFR if African American >60 >60.0  --   --   --   --    eGFR if non African American >60 >60.0  --   --   --   --    Calcium 9.7 10.1   < > 9.0 8.7 9.3   Magnesium  --   --    < > 1.7 1.6 1.6    < > = values in this interval not displayed.         CARDIAC BIOMARKERS:  Recent Labs   Lab 01/15/24  1640 01/15/24  2014 01/16/24  0032   Troponin I 40.118 H 35.094 H 29.547 H         COAGS:  Recent Labs   Lab 12/31/23  1447 01/15/24  1355   INR 1.1 1.2         LIPIDS/LFTS:  Recent Labs   Lab 12/01/21  0959 12/31/23  1447 01/01/24  0235 01/16/24  0739 01/17/24 0449 01/18/24  0400   Cholesterol 232 H 198  --   --   --   --    Triglycerides 275 H 319 H  --   --   --   --    HDL 40 37 L  --   --   --   --    LDL Cholesterol 137.0 97.2  --   --   --   --    Non-HDL Cholesterol 192 161  --   --   --   --    AST 20 19   < > 44 H 30 24   ALT 23 14   < > 19 17 15    < > = values in this interval not displayed.         Hemoglobin A1C   Date Value Ref Range Status   01/01/2024 11.9 (H) 4.0 - 5.6 % Final     Comment:     ADA Screening Guidelines:  5.7-6.4%  Consistent with prediabetes  >or=6.5%  Consistent with diabetes    High levels of fetal hemoglobin interfere with the HbA1C  assay. Heterozygous hemoglobin variants (HbS, HgC, etc)do  not significantly interfere with this assay.   However, presence of multiple variants  may affect accuracy.     12/01/2021 >14.0 (H) 4.0 - 5.6 % Final     Comment:     ADA Screening Guidelines:  5.7-6.4%  Consistent with prediabetes  >or=6.5%  Consistent with diabetes    High levels of fetal hemoglobin interfere with the HbA1C  assay. Heterozygous hemoglobin variants (HbS, HgC, etc)do  not significantly interfere with this assay.   However, presence of multiple variants may affect accuracy.     03/20/2019 7.3 (H) 4.0 - 5.6 % Final     Comment:     ADA Screening Guidelines:  5.7-6.4%  Consistent with prediabetes  >or=6.5%  Consistent with diabetes  High levels of fetal hemoglobin interfere with the HbA1C  assay. Heterozygous hemoglobin variants (HbS, HgC, etc)do  not significantly interfere with this assay.   However, presence of multiple variants may affect accuracy.         TSH  Recent Labs   Lab 12/31/23  1447 01/15/24  1640   TSH 11.456 H 8.398 H         The ASCVD Risk score (Melanie HEWITT, et al., 2019) failed to calculate for the following reasons:    The patient has a prior MI or stroke diagnosis       BNP    Lab Results   Component Value Date/Time     (H) 01/15/2024 01:55 PM    BNP 44 03/04/2021 08:28 AM            ECHO    Results for orders placed during the hospital encounter of 12/31/23    Echo    Interpretation Summary    Left Ventricle: The left ventricle is normal in size. Mildly increased wall thickness. There is mild concentric hypertrophy. Severe global hypokinesis present. There is severely reduced systolic function with a visually estimated ejection fraction of 10 -15%. Unable to assess diastolic function due to atrial fibrillation.    Right Ventricle: Mild right ventricular enlargement. Systolic function is severely reduced.    Mitral Valve: There is no stenosis. There is mild regurgitation.    Aorta: Aortic root is mildly dilated measuring 3.80 cm.    Pulmonary Artery: The estimated pulmonary artery systolic pressure is 32 mmHg.    Pt appears to be in AFib throughout  exam.      Results for orders placed during the hospital encounter of 01/15/24    Echo    Interpretation Summary    Left Ventricle: The left ventricle is mildly dilated. Normal wall thickness. Regional wall motion abnormalities present. There is severely reduced systolic function with a visually estimated ejection fraction of 10 -15%. There is diastolic dysfunction.anteroseptal wall thinnned out and appears infarcted    Right Ventricle: Normal right ventricular cavity size. Systolic function is normal.    Left Atrium: Left atrium is severely dilated.    Right Atrium: Right atrium is moderately dilated.    Mitral Valve: There is mild regurgitation.    Tricuspid Valve: There is mild regurgitation.    Pulmonary Artery: The estimated pulmonary artery systolic pressure is 31 mmHg.    IVC/SVC: Elevated venous pressure at 15 mmHg.      STRESS TEST    No results found for this or any previous visit.        CATH    No results found for this or any previous visit.     Assessment and Plan:     Brief HPI:     A-fib  Rate controlled.  Will titrate beta blockers as needed and tolerated.    Myocardial infarction  Patient patient with late presentation of MI.  Aneurysmal changes on EKG, troponins initial at 44, downtrending to 35.  Also altered sensorium.  High-risk for intubation per critical care team and will not be able to tolerate any kind of procedures because of his altered sensorium.  Since this is late presentation of MI, and it is not having ongoing chest pain and the troponins are downtrending, the risk of coronary angiogram at the current time far exceeds any potential benefit.  Continue aspirin Plavix heparin drip.  Recheck 2D echo    1/16:  Continues to be chest pain-free.  Continue medical management    1/17:  Continues to be chest pain-free.  Continue medical management.  Will do stress test as an outpatient to see if he has any significant areas of ischemia to see if he would benefit from  revascularization.    Acute on chronic combined systolic and diastolic heart failure  Last EF of 10%.  In 2018 had EF of 40-45%.  IV diuresis.  Will initiate guideline medical therapy when possible.    Chronic atrial fibrillation with RVR  Heart rate better controlled.  Amiodarone drip discontinued.  Continue anticoagulation    Type 2 diabetes mellitus with diabetic cataract, without long-term current use of insulin        History of embolic stroke L MCA; left basal ganglia; hospitalization [facility] 1/2019            VTE Risk Mitigation (From admission, onward)           Ordered     apixaban tablet 5 mg  2 times daily         01/17/24 1646     IP VTE HIGH RISK PATIENT  Once         01/15/24 1626     Place sequential compression device  Until discontinued         01/15/24 1626                    Kamari Sommer MD  Cardiology  [facility] - Intensive Care      Critical Care Time:  35 minutes     Critical care was time spent personally by me on the following activities: development of treatment plan with patient or surrogate and bedside caregivers, discussions with consultants, evaluation of patient's response to treatment, examination of patient, ordering and performing treatments and interventions, ordering and review of laboratory studies, ordering and review of radiographic studies, pulse oximetry, re-evaluation of patient's condition. This critical care time did not overlap with that of any other provider or involve time for any procedures.

## 2024-01-22 NOTE — DISCHARGE SUMMARY
[facility] [place] Intensive [facility]  [facility] Medicine  Discharge Summary      Patient Name: Kamar Doshi  MRN: [id number]  SHIRIN: [id number]  Patient Class: IP- Inpatient  Admission Date: 1/15/2024  Hospital Length of Stay: 3 days  Discharge Date and Time: 1/18/2024  5:48 PM  Attending Physician: No att. providers found   Discharging Provider: Freddy Kern III, MD  Primary Care Provider: Bethel Harley MD    Primary Care Team: Networked reference to record PCT     HPI:   Mr Kamar Doshi is a 73 y.o. man with CHF EF 10%, history of CVA, DM who presented with altered mental status. He is currently able to wake up on BiPAP and answers simple questions. He denies pain and denies chest pain. Further history from ED records- per wife, he has aphasia, had been less interactive/more lethargic over past few days. Presented to ED. Soiled on ED admit. Initial concern for sepsis for Afib RVR. Given metoprolol, vanc, zosyn, 500cc IVF bolus. Labs resulted with NSTEMI. Given asa 325, plavix 600, and started heparin gtt. Developed respiratory distress, started BiPAP, and given neb and total 60mg IV lasix. Admitted to ICU for further workup.     * No surgery found *      Hospital Course:   73M with pmh of hfref ef of 10%, cva, dm who presented due to change in mentation requiring bipap on admission. Labs resulted with NSTEMI. Given asa 325, plavix 600, and started heparin gtt. Developed respiratory distress, started BiPAP, and given neb and total 60mg IV lasix. Admitted to ICU for further workup. Pt seen by cardiology who recommended medical management. Encephalopathy waxed and waned throughout hospitalization. Palliative care consulted and after discussions with pts wife decision was made to transition to hospice at home. This was set up by case management over the coming days and pt d/c home with hospice     Goals of Care Treatment Preferences:  Code Status: DNR          What is most important right now is to focus on  spending time at home, avoiding the hospital, remaining as independent as possible, symptom/pain control, quality of life, even if it means sacrificing a little time.        Consults:   Consults (From admission, onward)          Status Ordering Provider     Inpatient consult to PICC team (NIAS)  Once        Provider:  (Not yet assigned)    Completed SHAY IBARRA III     Inpatient consult to Palliative Care  Once        Provider:  (Not yet assigned)    Completed SHAY IBARRA III     Inpatient consult to Social Work/Case Management  Once        Provider:  (Not yet assigned)    Completed CARMINA KAYE     Inpatient consult to Pulmonology  Once        Provider:  Johanna Shook MD    Completed CARMINA KAYE     Inpatient consult to Cardiology  Once        Provider:  Kamari Sommer MD    Completed CARMINA KAYE            No new Assessment & Plan notes have been filed under this hospital service since the last note was generated.  Service: Hospital Medicine    Final Active Diagnoses:    Diagnosis Date Noted POA    PRINCIPAL PROBLEM:  NSTEMI (non-ST elevated myocardial infarction) [I21.4] 01/15/2024 Yes    Goals of care, counseling/discussion [Z71.89] 01/17/2024 Not Applicable    Acute hypoxemic respiratory failure [J96.01] 01/15/2024 Yes    Leukocytosis [D72.829] 01/15/2024 Yes    Acute metabolic encephalopathy [G93.41] 01/15/2024 Yes    Myocardial infarction [I21.9] 01/15/2024 Yes    A-fib [I48.91] 01/15/2024 Yes    Abdominal aortic aneurysm (AAA) without rupture on CT 3/4/21 [I71.40] 04/13/2021 Yes    Chronic atrial fibrillation with RVR [I48.20] 02/01/2019 Yes    Acute on chronic combined systolic and diastolic heart failure [I50.43] 02/01/2019 Yes    Type 2 diabetes mellitus with diabetic cataract, without long-term current use of insulin [E11.36]  Yes    History of embolic stroke L MCA; left basal ganglia; hospitalization [facility] 1/2019 [Z86.73] 01/02/2019 Not Applicable      Problems Resolved During  this Admission:       Discharged Condition: poor    Disposition: Hospice/Home    Follow Up:   Follow-up Information       HOSPICE [facility] Follow up.    Contact information:  [address]  [facility] [address] [phone]                         Patient Instructions:   No discharge procedures on file.    Significant Diagnostic Studies: N/A    Pending Diagnostic Studies:       Procedure Component Value Units Date/Time    EKG 12-lead [[id number]]     Order Status: Sent Lab Status: No result     EKG 12-lead [[id number]]     Order Status: Sent Lab Status: No result     EKG 12-lead [[id number]]     Order Status: Sent Lab Status: No result            Medications:  Reconciled Home Medications:      Medication List        START taking these medications      aspirin 81 MG Chew  Take 1 tablet (81 mg total) by mouth once daily.     clopidogreL 75 mg tablet  Commonly known as: PLAVIX  Take 1 tablet (75 mg total) by mouth once daily.            CONTINUE taking these medications      apixaban 5 mg Tab  Commonly known as: ELIQUIS  Take 1 tablet (5 mg total) by mouth 2 (two) times daily.     atorvastatin 80 MG tablet  Commonly known as: LIPITOR  Take 1 tablet (80 mg total) by mouth once daily. Take 1 tab by mouth every night     brimonidine-timoloL 0.2-0.5 % Drop  Commonly known as: COMBIGAN  Place 1 drop into the left eye 2 (two) times a day.     LANTUS SOLOSTAR U-100 INSULIN glargine 100 units/mL SubQ pen  Generic drug: insulin  Inject 10 Units into the skin every evening.     latanoprost 0.005 % ophthalmic solution  Place 1 drop into both eyes nightly.     metFORMIN 500 MG tablet  Commonly known as: GLUCOPHAGE  Take 1 tablet (500 mg total) by mouth daily with breakfast.     metoprolol succinate 50 MG 24 hr tablet  Commonly known as: TOPROL-XL  Take 1 tablet (50 mg total) by mouth once daily.     pantoprazole 40 MG tablet  Commonly known as: PROTONIX  TAKE 1 TABLET BY MOUTH ONCE DAILY  IN THE MORNING BEFORE BREAKFAST              Indwelling Lines/Drains at time of discharge:   Lines/Drains/Airways       None                   Time spent on the discharge of patient: 40 minutes    Freddy Kern III, MD  Department of Hospital Medicine  [facility] - Intensive Care

## 2024-01-22 NOTE — HOSPITAL COURSE
73M with pmh of hfref ef of 10%, cva, dm who presented due to change in mentation requiring bipap on admission. Labs resulted with NSTEMI. Given asa 325, plavix 600, and started heparin gtt. Developed respiratory distress, started BiPAP, and given neb and total 60mg IV lasix. Admitted to ICU for further workup. Pt seen by cardiology who recommended medical management. Encephalopathy waxed and waned throughout hospitalization. Palliative care consulted and after discussions with pts wife decision was made to transition to hospice at home. This was set up by case management over the coming days and pt d/c home with hospice

## 2024-02-22 ENCOUNTER — PATIENT OUTREACH (OUTPATIENT)
Dept: ADMINISTRATIVE | Facility: HOSPITAL | Age: 74
End: 2024-02-22
Payer: MEDICARE

## 2024-04-15 PROBLEM — J96.01 ACUTE HYPOXEMIC RESPIRATORY FAILURE: Status: RESOLVED | Noted: 2024-01-15 | Resolved: 2024-04-15

## 2024-04-15 PROBLEM — I21.4 NSTEMI (NON-ST ELEVATED MYOCARDIAL INFARCTION): Status: RESOLVED | Noted: 2024-01-15 | Resolved: 2024-04-15

## 2024-04-22 PROBLEM — I21.9 MYOCARDIAL INFARCTION: Status: RESOLVED | Noted: 2024-01-15 | Resolved: 2024-04-22

## 2024-07-08 ENCOUNTER — HOSPITAL ENCOUNTER (EMERGENCY)
Facility: HOSPITAL | Age: 74
Discharge: HOME | End: 2024-07-09
Attending: EMERGENCY MEDICINE
Payer: MEDICARE
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
## 2024-07-08 DIAGNOSIS — M25.551 PAIN OF RIGHT HIP: ICD-10-CM

## 2024-07-08 LAB — POCT GLUCOSE: 131 MG/DL (ref 70–110)

## 2024-07-08 PROCEDURE — 99285 EMERGENCY DEPT VISIT HI MDM: CPT | Mod: 25

## 2024-07-08 PROCEDURE — 82962 GLUCOSE BLOOD TEST: CPT

## 2024-07-08 RX ORDER — ACETAMINOPHEN 500 MG
500 TABLET ORAL EVERY 6 HOURS PRN
Qty: 13 TABLET | Refills: 0 | Status: SHIPPED | OUTPATIENT
Start: 2024-07-08

## 2024-07-09 VITALS
OXYGEN SATURATION: 98 % | BODY MASS INDEX: 25.25 KG/M2 | WEIGHT: 176.38 LBS | HEART RATE: 67 BPM | RESPIRATION RATE: 20 BRPM | DIASTOLIC BLOOD PRESSURE: 85 MMHG | HEIGHT: 70 IN | TEMPERATURE: 98 F | SYSTOLIC BLOOD PRESSURE: 119 MMHG

## 2024-07-09 NOTE — DISCHARGE INSTRUCTIONS

## 2024-07-09 NOTE — ED NOTES
Called pt's wife to pick him from hospital states she can't come and request to send him via uber/lyft.

## 2024-07-09 NOTE — ED PROVIDER NOTES
"Encounter Date: 2024       History     Chief Complaint   Patient presents with    Fall     Pt states was walking then possibly tripped and fell striking his R side  on the ground. Unk LOC. Pt oriented to Person only. Per EMS pts family states this is his normal mental status. Pt appears unkempt. Denies HA or chest pain. No obvious trauma noted, various bruising noted to arms. Possible blood thinners but pt states " I think so"     74 yo male pmh chf, afib, hld, htn, [facility] presenting 2/2 trip and fall. States he tripped over a lip on the floor and fell back on his right hip. Didn't walk afterwards. On hospice care with comfort measures. Patient oriented to person and place. Per ems this is baseline. Thinks he hit head but remembers tripping, hitting floor, and ems coming to get him. Happened tonight. No chest pain or weakness before fall. No urinary or bm complaints. No recent sickness.         Review of patient's allergies indicates:  No Known Allergies  Past Medical History:   Diagnosis Date    Atrial fibrillation     CHF (congestive heart failure)     Clotting disorder     Diabetes mellitus     Hyperlipidemia     Hypertension     Stroke     Vision loss 2019     Past Surgical History:   Procedure Laterality Date    BRAIN SURGERY  2018     Family History   Problem Relation Name Age of Onset    Arthritis Mother      Arthritis Father      Heart disease Father      No Known Problems Sister older     COPD Brother      No Known Problems Sister      Peripheral vascular disease Sister      Alcohol abuse Brother       Social History     Tobacco Use    Smoking status: Former     Current packs/day: 0.00     Average packs/day: 1 pack/day for 56.0 years (56.0 ttl pk-yrs)     Types: Cigarettes     Start date:      Quit date: 2019     Years since quittin.5    Smokeless tobacco: Never   Substance Use Topics    Alcohol use: No    Drug use: No     Review of Systems   Constitutional:  Negative for fever. "   HENT:  Negative for sore throat.    Respiratory:  Negative for shortness of breath.    Cardiovascular:  Negative for chest pain.   Gastrointestinal:  Negative for nausea.   Genitourinary:  Negative for dysuria.   Musculoskeletal:  Positive for arthralgias and neck pain. Negative for back pain.   Skin:  Negative for rash.   Neurological:  Negative for weakness.   Hematological:  Does not bruise/bleed easily.       Physical Exam     Initial Vitals [07/08/24 2011]   BP Pulse Resp Temp SpO2   100/60 80 16 98.3 °F (36.8 °C) (!) 94 %      MAP       --         Physical Exam    Nursing note and vitals reviewed.  Constitutional: He appears well-developed and well-nourished.   HENT:   Head: Normocephalic and atraumatic.   Mouth/Throat: Oropharynx is clear and moist.   No Diaz sign or raccoon eyes or septal hematoma.  No C or T or L-spine tenderness or step-off or crepitus.   Eyes: EOM are normal. Pupils are equal, round, and reactive to light.   Neck:   Normal range of motion.  Cardiovascular:  Normal rate and regular rhythm.           Pulmonary/Chest: Breath sounds normal. No stridor. No respiratory distress. He has no wheezes.   Abdominal: Abdomen is soft. Bowel sounds are normal. He exhibits no distension. There is no abdominal tenderness.   Musculoskeletal:         General: No tenderness or edema. Normal range of motion.      Cervical back: Normal range of motion.      Comments: Right hip tenderness.  Bruising to right wrist and left hand.     Neurological: He is alert and oriented to person, place, and time. He has normal strength. GCS score is 15. GCS eye subscore is 4. GCS verbal subscore is 5. GCS motor subscore is 6.   Skin: Skin is warm and dry. Capillary refill takes less than 2 seconds.   Bruising on left hand and right wrist.   Psychiatric: He has a normal mood and affect. Thought content normal.         ED Course   Procedures  Labs Reviewed   POCT GLUCOSE - Abnormal; Notable for the following components:        Result Value    POCT Glucose 131 (*)     All other components within normal limits   POCT GLUCOSE MONITORING CONTINUOUS          Imaging Results              CT Head Without Contrast (Final result)  Result time 07/08/24 23:14:05      Final result by Kaveh Melgoza MD (07/08/24 23:14:05)                   Impression:      Chronic changes are noted, there is no evidence for acute intracranial process.    Findings referable to the face, orbits and paranasal sinuses are dictated separately.      Electronically signed by: Kaveh Melgoza  Date:    07/08/2024  Time:    23:14               Narrative:    EXAMINATION:  CT HEAD WITHOUT CONTRAST    CLINICAL HISTORY:  Head trauma, minor (Age >= 65y);    TECHNIQUE:  Low dose axial images were obtained through the head.  Coronal and sagittal reformations were also performed. Contrast was not administered.    COMPARISON:  CT examination of the brain without contrast January 15, 2024    FINDINGS:  The ventricular system, sulcal pattern and parenchymal attenuation characteristics are consistent with chronic change.  Involutional change and chronic appearing white matter changes noted.  There is gliosis and encephalomalacia involving the left cerebral hemisphere correlating with the appearance on the prior study consistent with remote ischemia/infarct, there is associated compensatory enlargement of the left lateral ventricle.  Gliosis and encephalomalacia at the right occipital lobe consistent with an area of remote ischemia/infarct is also noted.    There is no evidence for intracranial mass, mass effect or midline shift and there is no evidence for acute intracranial hemorrhage.  Appropriate CSF spaces are seen at the skull base.    The mastoid air cells appear well aerated.  Opacity along the external auditory canal on the right consistent with cerumen is noted.  Findings referable to the face, orbits and paranasal sinuses are dictated separately.  The calvarium appears  intact.                                       CT Maxillofacial Without Contrast (Final result)  Result time 07/08/24 23:14:12      Final result by Kaveh Melgoza MD (07/08/24 23:14:12)                   Impression:      There is no evidence for acute facial or orbital fracture deformity.    Additional findings as above.      Electronically signed by: Kaveh Melgoza  Date:    07/08/2024  Time:    23:14               Narrative:    EXAMINATION:  CT MAXILLOFACIAL WITHOUT CONTRAST    CLINICAL HISTORY:  Facial trauma, blunt;    TECHNIQUE:  Low dose axial images, sagittal and coronal reformations were obtained through the face.  Contrast was not administered.    COMPARISON:  None    FINDINGS:  The globes, extraocular muscles and optic nerves of the orbits appear intact, there is no evidence for retrobulbar hematoma.    The paranasal sinuses demonstrate minimal mucosal thickening, without a large degree of opacity or air-fluid level.  Small focal area of opacity of the right maxillary antrum may relate to a small focal area of mucosal thickening or mucous retention cyst.    There is limited visualization the mastoid air cells, the visualized aspects appear appropriate as does the middle ear cavity bilaterally.  Opacity along the external auditory canal on the right may relate to cerumen.    The temporomandibular joints appear intact.  Dental findings are noted, correlation for acute versus chronic dental disease is noted, the note is made of lack of multiple dentition.    On close evaluation of available imaging, there is no evidence for acute facial or orbital fracture deformity.                                       CT Cervical Spine Without Contrast (Final result)  Result time 07/08/24 23:14:19      Final result by Kaveh Melgoza MD (07/08/24 23:14:19)                   Impression:      Multilevel chronic change of the cervical spine, correlation for any specific level of symptomatology is needed.    There is  no evidence for acute cervical spine fracture deformity.    12.8 mm right thyroid lobe nodule.      Electronically signed by: Kaveh Simonszier  Date:    07/08/2024  Time:    23:14               Narrative:    EXAMINATION:  CT CERVICAL SPINE WITHOUT CONTRAST    CLINICAL HISTORY:  Neck trauma (Age >= 65y);    TECHNIQUE:  Low dose axial images, sagittal and coronal reformations were performed though the cervical spine.  Contrast was not administered.    COMPARISON:  None    FINDINGS:  Chronic appearing endplate changes are noted, vacuum phenomenon noted.  Marginal osteophyte formation noted.  There is no high-grade spondylolisthesis, there is no evidence for high-grade or acute compression fracture deformity.  There is somewhat prominent facet arthropathy noted on the left at C2-3.  There is no evidence for facet dislocation or facet fracture deformity.  The occipital condyles articulate appropriately with the superior articular facets of C1 at the craniocervical junction.    There are chronic changes at C1-2, there is diminished space between the anterior arch of C1 and the dens of C2, there is mild vacuum phenomena, there is osteophyte formation.  C1 and C2 appear intact.    C2-3 level demonstrates mild disc osteophyte disease, mild anterior impression upon the dural sac.  There is no high-grade spinal canal stenosis.  There is uncovertebral spurring and facet arthropathy more prominent on the left, there is mild left foraminal stenosis.    The C3-4 level demonstrates disc osteophyte disease with anterior impression upon the dural sac.  There is mild spinal canal stenosis.  There is uncovertebral spurring and facet arthropathy, there is mild foraminal stenosis, left greater than right.    The C4-5 level demonstrates disc osteophyte disease with mild asymmetry to the right.  There is mild spinal canal narrowing.  There is uncovertebral spurring and facet arthropathy, there is mild right foraminal stenosis, left foraminal  narrowing.    The C5-6 level demonstrates disc osteophyte disease with mild anterior impression upon the dural sac.  There is no spinal canal stenosis.  There is uncovertebral spurring, there is mild right foraminal stenosis, mild left foraminal narrowing.    The C6-7 level demonstrates mild disc osteophyte disease, there is no high-grade spinal canal stenosis.  There is bilateral mild foraminal stenosis.    The C7-T1 level demonstrates no evidence for high-grade spinal canal or foraminal stenosis.    On close evaluation of available imaging, there is no evidence for acute cervical spine fracture deformity.    Limited imaging of the lung apices appears clear.    There is a right thyroid lobe nodule measuring approximately 12.8 mm.                                       X-Ray Chest AP Portable (Final result)  Result time 07/08/24 22:26:51      Final result by Fernanda Perez MD (07/08/24 22:26:51)                   Impression:      No acute intrathoracic abnormality detected.      Electronically signed by: Fernanda Perez  Date:    07/08/2024  Time:    22:26               Narrative:    EXAMINATION:  AP PORTABLE CHEST    CLINICAL HISTORY:  fall;    TECHNIQUE:  AP portable chest radiograph was submitted.    COMPARISON:  01/16/2024    FINDINGS:  AP portable chest radiograph demonstrates a cardiac silhouette within normal limits.  Vascular calcification is seen at the aortic knob.  There is no focal consolidation, pneumothorax, or pleural effusion.                                       X-Ray Femur 2 AP/LAT Right (Final result)  Result time 07/08/24 22:26:12      Final result by Gee Villafuerte DO (07/08/24 22:26:12)                   Impression:      No acute fracture or dislocation.      Electronically signed by: Gee Villafuerte  Date:    07/08/2024  Time:    22:26               Narrative:    EXAMINATION:  XR FEMUR 2 VIEW RIGHT    CLINICAL HISTORY:  Unspecified fall, initial encounter    TECHNIQUE:  AP and lateral  views of the right femur were performed.    COMPARISON:  None    FINDINGS:  There is no acute fracture or dislocation of the right femur.  Alignment is normal.  The femoral head is well seated in the acetabulum.  Remaining osseous structures are intact.  Joint spaces are preserved.  There are vascular calcifications.                                       X-Ray Wrist Complete Right (Final result)  Result time 07/08/24 22:25:30      Final result by Gee Villafuerte DO (07/08/24 22:25:30)                   Impression:      No acute fracture or dislocation of the hand or wrist.      Electronically signed by: Gee Villafuerte  Date:    07/08/2024  Time:    22:25               Narrative:    EXAMINATION:  XR HAND COMPLETE 3 VIEW LEFT; XR WRIST COMPLETE 3 VIEWS RIGHT    CLINICAL HISTORY:  fall;. Unspecified fall, initial encounter    TECHNIQUE:  PA, lateral, and oblique views of the left wrist.    PA, lateral, and oblique views of the left hand.    COMPARISON:  None    FINDINGS:  Left wrist: No acute fracture or dislocation. Alignment is normal. Mild joint space narrowing of the radiocarpal articulations noted.    Left hand: There is no acute fracture or dislocation. Alignment is normal. Joint spaces are preserved. There are no erosive changes.  There is nonspecific mild soft tissue edema or thickening of the 4th digit distally.                                       X-Ray Hand 3 View Left (Final result)  Result time 07/08/24 22:25:30      Final result by Gee Villafuerte DO (07/08/24 22:25:30)                   Impression:      No acute fracture or dislocation of the hand or wrist.      Electronically signed by: Gee Villafuerte  Date:    07/08/2024  Time:    22:25               Narrative:    EXAMINATION:  XR HAND COMPLETE 3 VIEW LEFT; XR WRIST COMPLETE 3 VIEWS RIGHT    CLINICAL HISTORY:  fall;. Unspecified fall, initial encounter    TECHNIQUE:  PA, lateral, and oblique views of the left wrist.    PA, lateral, and oblique  views of the left hand.    COMPARISON:  None    FINDINGS:  Left wrist: No acute fracture or dislocation. Alignment is normal. Mild joint space narrowing of the radiocarpal articulations noted.    Left hand: There is no acute fracture or dislocation. Alignment is normal. Joint spaces are preserved. There are no erosive changes.  There is nonspecific mild soft tissue edema or thickening of the 4th digit distally.                                       X-Ray Hip 2 or 3 views Right with Pelvis when performed (Final result)  Result time 07/08/24 22:00:30      Final result by Gee Villafuerte DO (07/08/24 22:00:30)                   Impression:      No acute fracture or dislocation.      Electronically signed by: Gee Villafuerte  Date:    07/08/2024  Time:    22:00               Narrative:    EXAMINATION:  XR HIP WITH PELVIS WHEN PERFORMED 2 OR 3 VIEWS RIGHT    CLINICAL HISTORY:  Unspecified fall, initial encounter    TECHNIQUE:  AP view of the pelvis and frog leg lateral view of the right hip were performed.    COMPARISON:  None    FINDINGS:  There is osteopenia.  There is no evidence of an acute fracture or dislocation of the pelvis or bilateral hips.  The femoral heads are well seated within the acetabula.  Joint spaces are preserved.  There are vascular calcifications.                                       Medications - No data to display  Medical Decision Making  Differential diagnosis includes but not limited to contusion, fracture, muscle strain, ligamentous strain, head bleed    73-year-old male presenting secondary mechanical trip fall.  On hospice with comfort measures.  Spoke with patient he states he is not confused and EMS states that he is at baseline per family.  I called family described the patient to him and they state that he is at baseline.  Comfort measures with hospice.  Blood sugar reassuring.  Imaging does not show any fractures.  Patient is ready to go home.  Family very comfortable with patient  coming home.  Vitals reassuring.  Good distal pulses.  Neurovascular intact. I discussed with the patient/family the diagnosis, treatment plan, indications for return to the emergency department, and for expected follow-up. The patient/family verbalized an understanding. The patient/family is asked if there are any questions or concerns. We discuss the case, until all issues are addressed to the patient/family's satisfaction. Patient/family understands and is agreeable to the plan.   Reji Garvin    DISCLAIMER: This note was prepared with [employer] voice recognition transcription software. Garbled syntax, mangled pronouns, and other bizarre constructions may be attributed to that software system.      Amount and/or Complexity of Data Reviewed  Radiology: ordered.    Risk  OTC drugs.                                      Clinical Impression:  Final diagnoses:  [W19.XXXA] Fall (Primary)  [M25.551] Pain of right hip          ED Disposition Condition    Discharge Stable          ED Prescriptions       Medication Sig Dispense Start Date End Date Auth. Provider    acetaminophen (TYLENOL) 500 MG tablet Take 1 tablet (500 mg total) by mouth every 6 (six) hours as needed. 13 tablet 7/8/2024 -- Reji Garvin MD          Follow-up Information       Follow up With Specialties Details Why Contact Info    Bethel Harley MD Internal Medicine Schedule an appointment as soon as possible for a visit in 2 days  [address] [facility]  [place] [id number]  [phone]               Reji Garvin MD  07/09/24 0116

## 2025-03-24 DIAGNOSIS — Z00.00 ENCOUNTER FOR MEDICARE ANNUAL WELLNESS EXAM: ICD-10-CM
